# Patient Record
Sex: MALE | Race: WHITE | NOT HISPANIC OR LATINO | Employment: OTHER | URBAN - NONMETROPOLITAN AREA
[De-identification: names, ages, dates, MRNs, and addresses within clinical notes are randomized per-mention and may not be internally consistent; named-entity substitution may affect disease eponyms.]

---

## 2022-04-26 ENCOUNTER — OUTSIDE FACILITY SERVICE (OUTPATIENT)
Dept: FAMILY MEDICINE CLINIC | Facility: CLINIC | Age: 72
End: 2022-04-26

## 2022-04-26 PROCEDURE — 99306 1ST NF CARE HIGH MDM 50: CPT | Performed by: FAMILY MEDICINE

## 2022-05-10 ENCOUNTER — OUTSIDE FACILITY SERVICE (OUTPATIENT)
Dept: FAMILY MEDICINE CLINIC | Facility: CLINIC | Age: 72
End: 2022-05-10

## 2022-05-10 PROCEDURE — 99307 SBSQ NF CARE SF MDM 10: CPT | Performed by: FAMILY MEDICINE

## 2022-05-12 ENCOUNTER — HOSPITAL ENCOUNTER (OUTPATIENT)
Dept: GENERAL RADIOLOGY | Facility: HOSPITAL | Age: 72
Discharge: HOME | End: 2022-05-12
Admitting: FAMILY MEDICINE

## 2022-05-12 DIAGNOSIS — R13.12 DYSPHAGIA, OROPHARYNGEAL: ICD-10-CM

## 2022-05-12 PROCEDURE — 92611 MOTION FLUOROSCOPY/SWALLOW: CPT | Performed by: SPEECH-LANGUAGE PATHOLOGIST

## 2022-05-12 PROCEDURE — 74230 X-RAY XM SWLNG FUNCJ C+: CPT

## 2022-05-12 PROCEDURE — 63710000001 BARIUM SULFATE 96 % RECONSTITUTED SUSPENSION: Performed by: FAMILY MEDICINE

## 2022-05-12 PROCEDURE — A9270 NON-COVERED ITEM OR SERVICE: HCPCS | Performed by: FAMILY MEDICINE

## 2022-05-12 RX ADMIN — BARIUM SULFATE 20 ML: 960 POWDER, FOR SUSPENSION ORAL at 13:34

## 2022-05-12 NOTE — THERAPY EVALUATION
Speech Language Pathology   [facility] [place]  [facility]       Patient Name: Fer Ivan  : 1950  MRN: [id number]    Today's Date: 2022      Visit Date: 2022   SPEECH PATHOLOGY MODIFIED BARIUM SWALLOW STUDY       Views: Patient seated at 90 degrees in:    x__lateral view    __A/P    Ability to follow instructions: __Excellent        __Good          x__Fair  __Poor      Dentition: __Natural  __Dentures   x__Edentulous         __Partials    Presence of Oral Motor Weakness:  Left side weakness    The following consistencies were given, with symptoms as noted:  Consistency Method Labial Seal Oral Prep AP Transit Premature Spillage Delayed Swallow Reflex    Thin 5ml  spoon         Thin  5ml Cup rim performed independent chin tuck Pt took a very small sip.  Maybe 2.5ml Falls to floor of mouth       Pudding 5 ml spoon         Cracker with barium puding 5 ml   tongue pumpting with poor oral control piecemeal Yes, wile completing oral prep part of the bolus begins to go over back of tongue Yes; level of valleculae Multi swallows   Impairments:  x__Premature loss of bolus  __Reduced velopharyngeal closure  x__Decreased lingual propulsion  __Reduced epiglottic inversion  __Decreased hyolaryngeal elevation  __Reduced laryngeal closure  __Reduced esophageal sphincter opening      Penetration-Aspiration Scale Rating:     Category Score Descriptions   No penetration or aspiration 1 Contrast does not enter the airway   Penetration 2 Contrast enters the airway, remains above vocal folds; no residue    3 Contrast remains above vocal folds; visible residue remains    4 Contrast contacts vocal folds; no residue    5 Contrast contacts vocal folds; visible residue remains   Aspiration 6 Contrast passes glottis; no subglottic residue visible    7 Contrast passes glottis; visible subglottic residue despite patient's response    8 Contrast passes glottis; visible subglottic residue; absent patient  "response   Dysphagia Scale Rating:    Dysphagia Rating Scale Explanation   0   Normal swallowing mechanism     1 Minimal Dysphagia- video swallow shows slight deviance from a normal swallow. Patient may report change in sensation during swallow. No change in diet is required.     2 Mild Dysphagia- oropharyngeal dysphagia present, which can be managed by specific swallow suggestions. Slight modification in consistency of diet may be indicated.      3 Mild-Moderate Dysphagia- potential for aspiration exists but is diminished by specific swallow techniques and a modified diet. Time for eating is significantly increased; thus supplemental nutrition may be indicated.      4 Moderate Dysphagia- significant potential for aspiration exists. Trace aspiration of one or more consistencies may be seen under videofluoroscopy. Patient may eat certain consistencies by using specific techniques to minimize potential for aspiration and/or facilitate swallowing. Supervision at mealtimes required. May require supplemental nutrition orally or via feeding tube.      5 Moderately Severe Dysphagia- patient aspirates 5% to 10% on one or more consistencies, with potential for aspiration on all consistencies. Potential for aspiration minimized by specific swallow instructions. Cough reflex absent or non-protective. Alterative mode of feeding required to maintain patient's nutritional needs. If pulmonary status is compromised, \"nothing by mouth\" may be indicated.      6 Severe Dysphagia- more than 10% aspiration for all consistencies. \"Nothing by mouth\" recommended.        Visit Dx:     ICD-10-CM ICD-9-CM   1. Dysphagia, oropharyngeal  R13.12 787.22       There is no problem list on file for this patient.       Past Medical History:   Diagnosis Date   • Stroke (HCC)         No past surgical history on file.               OP SLP Assessment/Plan - 05/12/22 1320        SLP Assessment    Functional Problems Swallowing  -EC    Impact on Function: " Swallowing Risk of malnourishment;Risk of dehydration  -EC    Clinical Impression: Swallowing Moderate:;oral phase dysphagia  -EC    Functional Problems Comment pt has not been able to take PO since CVA 4/1  -EC    Clinical Impression Comments pt has potential to begin PO diet with supervision of SLP at SNF  -EC    Prognosis Good (comment)  -EC    Patient/caregiver participated in establishment of treatment plan and goals Yes  -EC    Patient would benefit from skilled therapy intervention Yes  -EC       SLP Plan    Frequency eval only : MBS  -EC          User Key  (r) = Recorded By, (t) = Taken By, (c) = Cosigned By    Initials Name Provider Type    EC Yadi Guzman CCC-SLP Speech and Language Pathologist                 SLP Adult Swallow Evaluation     Row Name 05/12/22 6770       Rehab Evaluation    Document Type evaluation  -EC    Subjective Information no complaints  -EC    Patient Observations alert;cooperative;agree to therapy  -EC    Patient/Family/Caregiver Comments/Observations seen in radiology  -EC    Patient Effort good  -EC       General Information    Patient Profile Reviewed yes  -EC    Pertinent History Of Current Problem CVA on 4/1 with left side weakness and NPO since that time.  -EC    Current Method of Nutrition gastrostomy feedings  -EC    Prior Level of Function-Swallowing unknown  -EC    Plans/Goals Discussed with patient  -EC    Barriers to Rehab cognitive status  -EC       Oral Motor Structure and Function    Dentition Assessment edentulous  -EC    Secretion Management WNL/WFL  -EC    Mucosal Quality moist, healthy  -EC    Volitional Swallow weak  -EC       MBS/VFSS    Utensils Used spoon;cup  -EC    Consistencies Trialed thin liquids;pureed;soft textures  -EC       MBS/VFSS Interpretation    Oral Prep Phase impaired oral phase of swallowing  -EC    Oral Transit Phase impaired  -EC    Oral Residue impaired  -EC    VFSS Summary there is no aspiration or penetration noted during study.   -EC    Oral Phase, Comment pt has decreased oral control of liquid and solid bolus with left side residue.  liquids to floor of the mouth with cup presentation and tongue pumping for a-p transit.  cracker with delayed a-p transit d/t decreased lingual control.  -EC       Oral Preparatory Phase    Oral Preparatory Phase reduced lip closure around utensil;prolonged manipulation  -EC    Reduced Lip Closure Around Utensil all consistencies tested  -EC    Prolonged Manipulation regular textures  -EC       Oral Transit Phase    Impaired Oral Transit Phase tongue pumping;piecemeal oral transit  -EC       Oral Residue    Impaired Oral Residue lingual residue;other (see comments)  left  -EC    Lingual Residue thin liquids;pudding/puree;secondary to reduced lingual range of motion  -EC       Initiation of Pharyngeal Swallow    Initiation of Pharyngeal Swallow bolus in valleculae  -EC    Pharyngeal Phase functional pharyngeal phase of swallowing  -EC    Pharyngeal Phase, Comment swallow initiation with cracker delayed d/t premature spillage during oral prep.  head of bolus to valleculae before swallow initation  -EC       SLP Evaluation Clinical Impression    SLP Swallowing Diagnosis moderate;oral dysphagia  -EC    Functional Impact risk of malnutrition;risk of aspiration/pneumonia  -EC    Rehab Potential/Prognosis, Swallowing good, to achieve stated therapy goals  -EC    Swallow Criteria for Skilled Therapeutic Interventions Met demonstrates skilled criteria  -EC       Recommendations    Therapy Frequency (Swallow) evaluation only  -EC    SLP Diet Recommendation thin liquids;puree  -EC    Recommended Precautions and Strategies upright posture during/after eating;small bites of food and sips of liquid;no straw;alternate between small bites of food and sips of liquid;check mouth frequently for oral residue/pocketing;hard swallow with each bite or sip;liquid via spoon only;1:1 supervision;assist with feeding  -EC    Oral Care  Recommendations Oral Care BID/PRN  -EC    SLP Rec. for Method of Medication Administration as tolerated  -EC    Monitor for Signs of Aspiration yes  -EC          User Key  (r) = Recorded By, (t) = Taken By, (c) = Cosigned By    Initials Name Provider Type    EC Yadi Guzman CCC-SLP Speech and Language Pathologist                                OP SLP Education     Row Name 05/12/22 1320       Education    Barriers to Learning Decreased comprehension  -EC    Action Taken to Address Barriers results relayed to treating SLP at [facility]  -EC    Education Provided Described results of evaluation;Patient requires further education on strategies, risks  -EC    Assessed Learning needs;Learning motivation;Learning preferences;Learning readiness  -EC    Learning Motivation Strong  -EC    Learning Method Explanation  -EC    Teaching Response Verbalized understanding  -EC          User Key  (r) = Recorded By, (t) = Taken By, (c) = Cosigned By    Initials Name Effective Dates    EC Yadi Guzman CCC-SLP 06/16/21 -                                    Time Calculation:   Untimed Charges  SLP Eval/Re-eval : ST Motion Fluoro Eval Swallow - 92846  [id number] Motion Fluoro Eval Swallow Minutes: 38  Total Minutes  Untimed Charges Total Minutes: 38   Total Minutes: 38    Therapy Charges for Today     Code Description Service Date Service Provider Modifiers Qty    [id number]  ST MOTION FLUORO EVAL SWALLOW 3 5/12/2022 Yadi Guzman CCC-SLP GN 1                  SYDNEY Ordonez  5/12/2022

## 2022-05-24 ENCOUNTER — OUTSIDE FACILITY SERVICE (OUTPATIENT)
Dept: FAMILY MEDICINE CLINIC | Facility: CLINIC | Age: 72
End: 2022-05-24

## 2022-05-24 PROCEDURE — 99308 SBSQ NF CARE LOW MDM 20: CPT | Performed by: FAMILY MEDICINE

## 2022-06-11 ENCOUNTER — HOSPITAL ENCOUNTER (INPATIENT)
Facility: HOSPITAL | Age: 72
LOS: 5 days | Discharge: HOME | End: 2022-06-16
Attending: STUDENT IN AN ORGANIZED HEALTH CARE EDUCATION/TRAINING PROGRAM | Admitting: INTERNAL MEDICINE

## 2022-06-11 ENCOUNTER — APPOINTMENT (OUTPATIENT)
Dept: CT IMAGING | Facility: HOSPITAL | Age: 72
End: 2022-06-11

## 2022-06-11 DIAGNOSIS — N13.30 HYDRONEPHROSIS, UNSPECIFIED HYDRONEPHROSIS TYPE: ICD-10-CM

## 2022-06-11 DIAGNOSIS — K92.0 HEMATEMESIS WITH NAUSEA: ICD-10-CM

## 2022-06-11 DIAGNOSIS — A41.9 SEPSIS, DUE TO UNSPECIFIED ORGANISM, UNSPECIFIED WHETHER ACUTE ORGAN DYSFUNCTION PRESENT: Primary | ICD-10-CM

## 2022-06-11 DIAGNOSIS — D50.8 OTHER IRON DEFICIENCY ANEMIA: ICD-10-CM

## 2022-06-11 DIAGNOSIS — R10.13 EPIGASTRIC PAIN: ICD-10-CM

## 2022-06-11 LAB
ABO GROUP BLD: NORMAL
ALBUMIN SERPL-MCNC: 3.7 G/DL (ref 3.5–5.2)
ALBUMIN/GLOB SERPL: 1.1 G/DL
ALP SERPL-CCNC: 84 U/L (ref 39–117)
ALT SERPL W P-5'-P-CCNC: 83 U/L (ref 1–41)
ANION GAP SERPL CALCULATED.3IONS-SCNC: 18 MMOL/L (ref 5–15)
AST SERPL-CCNC: 29 U/L (ref 1–40)
BASOPHILS # BLD AUTO: 0.04 10*3/MM3 (ref 0–0.2)
BASOPHILS NFR BLD AUTO: 0.2 % (ref 0–1.5)
BILIRUB SERPL-MCNC: 0.8 MG/DL (ref 0–1.2)
BLD GP AB SCN SERPL QL: NEGATIVE
BUN SERPL-MCNC: 56 MG/DL (ref 8–23)
BUN/CREAT SERPL: 32.2 (ref 7–25)
CALCIUM SPEC-SCNC: 9.9 MG/DL (ref 8.6–10.5)
CHLORIDE SERPL-SCNC: 96 MMOL/L (ref 98–107)
CO2 SERPL-SCNC: 20 MMOL/L (ref 22–29)
CREAT SERPL-MCNC: 1.74 MG/DL (ref 0.76–1.27)
D-LACTATE SERPL-SCNC: 1.9 MMOL/L (ref 0.5–2)
D-LACTATE SERPL-SCNC: 2.7 MMOL/L (ref 0.5–2)
DEPRECATED RDW RBC AUTO: 41.9 FL (ref 37–54)
EGFRCR SERPLBLD CKD-EPI 2021: 41.1 ML/MIN/1.73
EOSINOPHIL # BLD AUTO: 0 10*3/MM3 (ref 0–0.4)
EOSINOPHIL NFR BLD AUTO: 0 % (ref 0.3–6.2)
ERYTHROCYTE [DISTWIDTH] IN BLOOD BY AUTOMATED COUNT: 14.2 % (ref 12.3–15.4)
FLUAV RNA RESP QL NAA+PROBE: NOT DETECTED
FLUBV RNA RESP QL NAA+PROBE: NOT DETECTED
GLOBULIN UR ELPH-MCNC: 3.3 GM/DL
GLUCOSE SERPL-MCNC: 187 MG/DL (ref 65–99)
HCT VFR BLD AUTO: 40.2 % (ref 37.5–51)
HGB BLD-MCNC: 13.5 G/DL (ref 13–17.7)
IMM GRANULOCYTES # BLD AUTO: 0.09 10*3/MM3 (ref 0–0.05)
IMM GRANULOCYTES NFR BLD AUTO: 0.5 % (ref 0–0.5)
LYMPHOCYTES # BLD AUTO: 0.54 10*3/MM3 (ref 0.7–3.1)
LYMPHOCYTES NFR BLD AUTO: 3.3 % (ref 19.6–45.3)
MAGNESIUM SERPL-MCNC: 2.2 MG/DL (ref 1.6–2.4)
MCH RBC QN AUTO: 27.4 PG (ref 26.6–33)
MCHC RBC AUTO-ENTMCNC: 33.6 G/DL (ref 31.5–35.7)
MCV RBC AUTO: 81.5 FL (ref 79–97)
MONOCYTES # BLD AUTO: 1.09 10*3/MM3 (ref 0.1–0.9)
MONOCYTES NFR BLD AUTO: 6.6 % (ref 5–12)
NEUTROPHILS NFR BLD AUTO: 14.81 10*3/MM3 (ref 1.7–7)
NEUTROPHILS NFR BLD AUTO: 89.4 % (ref 42.7–76)
NRBC BLD AUTO-RTO: 0 /100 WBC (ref 0–0.2)
PLATELET # BLD AUTO: 441 10*3/MM3 (ref 140–450)
PMV BLD AUTO: 9.6 FL (ref 6–12)
POTASSIUM SERPL-SCNC: 5.3 MMOL/L (ref 3.5–5.2)
PROT SERPL-MCNC: 7 G/DL (ref 6–8.5)
RBC # BLD AUTO: 4.93 10*6/MM3 (ref 4.14–5.8)
RH BLD: POSITIVE
SARS-COV-2 RNA RESP QL NAA+PROBE: NOT DETECTED
SODIUM SERPL-SCNC: 134 MMOL/L (ref 136–145)
T&S EXPIRATION DATE: NORMAL
WBC NRBC COR # BLD: 16.57 10*3/MM3 (ref 3.4–10.8)

## 2022-06-11 PROCEDURE — 83605 ASSAY OF LACTIC ACID: CPT | Performed by: STUDENT IN AN ORGANIZED HEALTH CARE EDUCATION/TRAINING PROGRAM

## 2022-06-11 PROCEDURE — 86901 BLOOD TYPING SEROLOGIC RH(D): CPT | Performed by: STUDENT IN AN ORGANIZED HEALTH CARE EDUCATION/TRAINING PROGRAM

## 2022-06-11 PROCEDURE — 25010000002 ONDANSETRON PER 1 MG: Performed by: STUDENT IN AN ORGANIZED HEALTH CARE EDUCATION/TRAINING PROGRAM

## 2022-06-11 PROCEDURE — 36415 COLL VENOUS BLD VENIPUNCTURE: CPT

## 2022-06-11 PROCEDURE — 86850 RBC ANTIBODY SCREEN: CPT | Performed by: STUDENT IN AN ORGANIZED HEALTH CARE EDUCATION/TRAINING PROGRAM

## 2022-06-11 PROCEDURE — 85025 COMPLETE CBC W/AUTO DIFF WBC: CPT | Performed by: STUDENT IN AN ORGANIZED HEALTH CARE EDUCATION/TRAINING PROGRAM

## 2022-06-11 PROCEDURE — 25010000002 MORPHINE PER 10 MG: Performed by: STUDENT IN AN ORGANIZED HEALTH CARE EDUCATION/TRAINING PROGRAM

## 2022-06-11 PROCEDURE — 74176 CT ABD & PELVIS W/O CONTRAST: CPT

## 2022-06-11 PROCEDURE — 51702 INSERT TEMP BLADDER CATH: CPT

## 2022-06-11 PROCEDURE — 93005 ELECTROCARDIOGRAM TRACING: CPT | Performed by: STUDENT IN AN ORGANIZED HEALTH CARE EDUCATION/TRAINING PROGRAM

## 2022-06-11 PROCEDURE — 81001 URINALYSIS AUTO W/SCOPE: CPT | Performed by: STUDENT IN AN ORGANIZED HEALTH CARE EDUCATION/TRAINING PROGRAM

## 2022-06-11 PROCEDURE — 83735 ASSAY OF MAGNESIUM: CPT | Performed by: STUDENT IN AN ORGANIZED HEALTH CARE EDUCATION/TRAINING PROGRAM

## 2022-06-11 PROCEDURE — 36415 COLL VENOUS BLD VENIPUNCTURE: CPT | Performed by: STUDENT IN AN ORGANIZED HEALTH CARE EDUCATION/TRAINING PROGRAM

## 2022-06-11 PROCEDURE — 93010 ELECTROCARDIOGRAM REPORT: CPT | Performed by: INTERNAL MEDICINE

## 2022-06-11 PROCEDURE — 87636 SARSCOV2 & INF A&B AMP PRB: CPT | Performed by: STUDENT IN AN ORGANIZED HEALTH CARE EDUCATION/TRAINING PROGRAM

## 2022-06-11 PROCEDURE — 80053 COMPREHEN METABOLIC PANEL: CPT | Performed by: STUDENT IN AN ORGANIZED HEALTH CARE EDUCATION/TRAINING PROGRAM

## 2022-06-11 PROCEDURE — 99285 EMERGENCY DEPT VISIT HI MDM: CPT

## 2022-06-11 PROCEDURE — 86900 BLOOD TYPING SEROLOGIC ABO: CPT | Performed by: STUDENT IN AN ORGANIZED HEALTH CARE EDUCATION/TRAINING PROGRAM

## 2022-06-11 PROCEDURE — 87086 URINE CULTURE/COLONY COUNT: CPT | Performed by: HOSPITALIST

## 2022-06-11 RX ORDER — ONDANSETRON 2 MG/ML
4 INJECTION INTRAMUSCULAR; INTRAVENOUS ONCE
Status: COMPLETED | OUTPATIENT
Start: 2022-06-11 | End: 2022-06-11

## 2022-06-11 RX ORDER — PANTOPRAZOLE SODIUM 40 MG/10ML
80 INJECTION, POWDER, LYOPHILIZED, FOR SOLUTION INTRAVENOUS ONCE
Status: COMPLETED | OUTPATIENT
Start: 2022-06-11 | End: 2022-06-11

## 2022-06-11 RX ADMIN — PANTOPRAZOLE SODIUM 80 MG: 40 INJECTION, POWDER, FOR SOLUTION INTRAVENOUS at 19:28

## 2022-06-11 RX ADMIN — MORPHINE SULFATE 4 MG: 4 INJECTION INTRAVENOUS at 19:26

## 2022-06-11 RX ADMIN — ONDANSETRON 4 MG: 2 INJECTION INTRAMUSCULAR; INTRAVENOUS at 19:25

## 2022-06-11 RX ADMIN — SODIUM CHLORIDE, POTASSIUM CHLORIDE, SODIUM LACTATE AND CALCIUM CHLORIDE 1000 ML: 600; 310; 30; 20 INJECTION, SOLUTION INTRAVENOUS at 19:21

## 2022-06-11 NOTE — ED PROVIDER NOTES
Subjective   72-year-old male comes to the ER with chief complaint of nausea, vomiting, abdominal pain that has been constant since yesterday and slowly getting worse.  Patient endorses having black stool today.  The nursing facility sent him to the ER for evaluation.      History provided by:  Patient and nursing home  History limited by: Poor historian.   used: No        Review of Systems   Constitutional: Positive for activity change and appetite change. Negative for chills and fever.   HENT: Negative for drooling.    Eyes: Negative for redness.   Respiratory: Negative for cough, chest tightness, shortness of breath and wheezing.    Cardiovascular: Negative for chest pain and palpitations.   Gastrointestinal: Positive for abdominal pain, blood in stool, nausea and vomiting. Negative for constipation and diarrhea.   Genitourinary: Negative for flank pain.   Skin: Negative for color change.   Neurological: Negative for seizures.   Psychiatric/Behavioral: Negative for confusion.       Past Medical History:   Diagnosis Date   • Stroke (HCC)        No Known Allergies    No past surgical history on file.    No family history on file.    Social History     Socioeconomic History   • Marital status:            Objective    Vitals:    06/11/22 2226 06/11/22 2246 06/11/22 2306 06/11/22 2327   BP: 119/76 118/75 125/75 100/61   BP Location:       Patient Position:       Pulse: (!) 138 (!) 132 (!) 130 (!) 132   Resp:       Temp:       TempSrc:       SpO2:  94% 93% 93%   Weight:       Height:           Physical Exam  Vitals and nursing note reviewed.   Constitutional:       General: He is not in acute distress.     Appearance: He is well-developed. He is not ill-appearing, toxic-appearing or diaphoretic.   HENT:      Head: Normocephalic.      Right Ear: External ear normal.      Left Ear: External ear normal.   Eyes:      General: No scleral icterus.     Conjunctiva/sclera: Conjunctivae normal.    Cardiovascular:      Rate and Rhythm: Tachycardia present.   Pulmonary:      Effort: Pulmonary effort is normal. No accessory muscle usage or respiratory distress.      Breath sounds: No wheezing.   Chest:      Chest wall: No tenderness.   Abdominal:      General: Bowel sounds are normal.      Palpations: Abdomen is soft.      Tenderness: There is abdominal tenderness (deep palpation). There is no guarding or rebound.   Musculoskeletal:      Right lower leg: No edema.      Left lower leg: No edema.   Skin:     General: Skin is warm and dry.      Capillary Refill: Capillary refill takes less than 2 seconds.      Coloration: Skin is not pale.   Neurological:      Mental Status: He is alert and oriented to person, place, and time.   Psychiatric:         Behavior: Behavior normal. Behavior is cooperative.         ECG 12 Lead      Date/Time: 6/11/2022 7:24 PM  Performed by: Jesus Doherty MD  Authorized by: Jesus Doherty MD   Interpreted by physician  Rhythm: sinus tachycardia  Rate: tachycardic  QRS axis: normal  Conduction: left bundle branch block  ST segment elevation noted on lead: [id number].  Clinical impression: abnormal ECG                 ED Course      Results for orders placed or performed during the hospital encounter of 06/11/22   COVID-19 and FLU A/B PCR - Swab, Nasopharynx    Specimen: Nasopharynx; Swab   Result Value Ref Range    COVID19 Not Detected Not Detected - Ref. Range    Influenza A PCR Not Detected Not Detected    Influenza B PCR Not Detected Not Detected   Comprehensive Metabolic Panel    Specimen: Blood   Result Value Ref Range    Glucose 187 (H) 65 - 99 mg/dL    BUN 56 (H) 8 - 23 mg/dL    Creatinine 1.74 (H) 0.76 - 1.27 mg/dL    Sodium 134 (L) 136 - 145 mmol/L    Potassium 5.3 (H) 3.5 - 5.2 mmol/L    Chloride 96 (L) 98 - 107 mmol/L    CO2 20.0 (L) 22.0 - 29.0 mmol/L    Calcium 9.9 8.6 - 10.5 mg/dL    Total Protein 7.0 6.0 - 8.5 g/dL    Albumin 3.70 3.50 - 5.20 g/dL    ALT (SGPT) 83 (H) 1  - 41 U/L    AST (SGOT) 29 1 - 40 U/L    Alkaline Phosphatase 84 39 - 117 U/L    Total Bilirubin 0.8 0.0 - 1.2 mg/dL    Globulin 3.3 gm/dL    A/G Ratio 1.1 g/dL    BUN/Creatinine Ratio 32.2 (H) 7.0 - 25.0    Anion Gap 18.0 (H) 5.0 - 15.0 mmol/L    eGFR 41.1 (L) >60.0 mL/min/1.73   Lactic Acid, Plasma    Specimen: Blood   Result Value Ref Range    Lactate 2.7 (C) 0.5 - 2.0 mmol/L   Magnesium    Specimen: Blood   Result Value Ref Range    Magnesium 2.2 1.6 - 2.4 mg/dL   CBC Auto Differential    Specimen: Blood   Result Value Ref Range    WBC 16.57 (H) 3.40 - 10.80 10*3/mm3    RBC 4.93 4.14 - 5.80 10*6/mm3    Hemoglobin 13.5 13.0 - 17.7 g/dL    Hematocrit 40.2 37.5 - 51.0 %    MCV 81.5 79.0 - 97.0 fL    MCH 27.4 26.6 - 33.0 pg    MCHC 33.6 31.5 - 35.7 g/dL    RDW 14.2 12.3 - 15.4 %    RDW-SD 41.9 37.0 - 54.0 fl    MPV 9.6 6.0 - 12.0 fL    Platelets 441 140 - 450 10*3/mm3    Neutrophil % 89.4 (H) 42.7 - 76.0 %    Lymphocyte % 3.3 (L) 19.6 - 45.3 %    Monocyte % 6.6 5.0 - 12.0 %    Eosinophil % 0.0 (L) 0.3 - 6.2 %    Basophil % 0.2 0.0 - 1.5 %    Immature Grans % 0.5 0.0 - 0.5 %    Neutrophils, Absolute 14.81 (H) 1.70 - 7.00 10*3/mm3    Lymphocytes, Absolute 0.54 (L) 0.70 - 3.10 10*3/mm3    Monocytes, Absolute 1.09 (H) 0.10 - 0.90 10*3/mm3    Eosinophils, Absolute 0.00 0.00 - 0.40 10*3/mm3    Basophils, Absolute 0.04 0.00 - 0.20 10*3/mm3    Immature Grans, Absolute 0.09 (H) 0.00 - 0.05 10*3/mm3    nRBC 0.0 0.0 - 0.2 /100 WBC   STAT Lactic Acid, Reflex    Specimen: Blood   Result Value Ref Range    Lactate 1.9 0.5 - 2.0 mmol/L   Type & Screen    Specimen: Blood   Result Value Ref Range    ABO Type A     RH type Positive     Antibody Screen Negative     T&S Expiration Date 6/14/2022 11:59:59 PM         CT Abdomen Pelvis Without Contrast   Final Result   1.  Moderately severe bilateral hydronephrosis and hydroureter.   The bladder is distended.   2.  No bowel obstruction, herniated bowel loops or focal   inflammatory  changes.   3.  Colonic diverticulosis      Electronically signed by:  Rakesh May MD  6/11/2022 10:09 PM CDT   Workstation: [id number]              MDM  Number of Diagnoses or Management Options  Hydronephrosis, unspecified hydronephrosis type: new and requires workup  Sepsis, due to unspecified organism, unspecified whether acute organ dysfunction present (HCC): new and requires workup  Diagnosis management comments: Vital signs are stable, afebrile.  Patient is tachycardic to the 140s.  Labs significant for creatinine 1.7 and unsure about baseline, lactate is 2.7 with a white count of 17.  Hemoglobin is normal at 13.5.  Patient received Protonix, IVF bolus, morphine, Zofran.  CT abdomen pelvis shows severe hydronephrosis, hydroureter, distended bladder.  No urine with flow out of the patient's catheter.  This was replaced and a thick sludgelike material drained slightly.  Continuous bladder irrigation was ordered.  Antibiotics ordered.  On-call hospitalist agrees to admit.      Final diagnoses:   Sepsis, due to unspecified organism, unspecified whether acute organ dysfunction present (HCC)   Hydronephrosis, unspecified hydronephrosis type       ED Disposition  ED Disposition     ED Disposition   Decision to Admit    Condition   --    Comment   --             No follow-up provider specified.       Medication List      No changes were made to your prescriptions during this visit.          Jesus Doherty MD  06/11/22 2993       Jesus Doherty MD  06/11/22 2180

## 2022-06-12 LAB
ANION GAP SERPL CALCULATED.3IONS-SCNC: 15 MMOL/L (ref 5–15)
BACTERIA UR QL AUTO: ABNORMAL /HPF
BASOPHILS # BLD AUTO: 0.09 10*3/MM3 (ref 0–0.2)
BASOPHILS NFR BLD AUTO: 0.6 % (ref 0–1.5)
BILIRUB UR QL STRIP: ABNORMAL
BUN SERPL-MCNC: 59 MG/DL (ref 8–23)
BUN/CREAT SERPL: 37.8 (ref 7–25)
CALCIUM SPEC-SCNC: 9.6 MG/DL (ref 8.6–10.5)
CHLORIDE SERPL-SCNC: 98 MMOL/L (ref 98–107)
CLARITY UR: ABNORMAL
CO2 SERPL-SCNC: 22 MMOL/L (ref 22–29)
COLOR UR: ABNORMAL
CRE SCREEN PCR: NOT DETECTED
CREAT SERPL-MCNC: 1.56 MG/DL (ref 0.76–1.27)
D-LACTATE SERPL-SCNC: 1.2 MMOL/L (ref 0.5–2)
DEPRECATED RDW RBC AUTO: 41.6 FL (ref 37–54)
EGFRCR SERPLBLD CKD-EPI 2021: 46.9 ML/MIN/1.73
EOSINOPHIL # BLD AUTO: 0.18 10*3/MM3 (ref 0–0.4)
EOSINOPHIL NFR BLD AUTO: 1.1 % (ref 0.3–6.2)
ERYTHROCYTE [DISTWIDTH] IN BLOOD BY AUTOMATED COUNT: 14.2 % (ref 12.3–15.4)
GLUCOSE SERPL-MCNC: 147 MG/DL (ref 65–99)
GLUCOSE UR STRIP-MCNC: NEGATIVE MG/DL
HCT VFR BLD AUTO: 30.8 % (ref 37.5–51)
HCT VFR BLD AUTO: 37.8 % (ref 37.5–51)
HGB BLD-MCNC: 10.3 G/DL (ref 13–17.7)
HGB BLD-MCNC: 12.6 G/DL (ref 13–17.7)
HGB UR QL STRIP.AUTO: ABNORMAL
HYALINE CASTS UR QL AUTO: ABNORMAL /LPF
IMM GRANULOCYTES # BLD AUTO: 0.09 10*3/MM3 (ref 0–0.05)
IMM GRANULOCYTES NFR BLD AUTO: 0.6 % (ref 0–0.5)
IMP STRAIN: NOT DETECTED
KETONES UR QL STRIP: NEGATIVE
KPC STRAIN: NOT DETECTED
LEUKOCYTE ESTERASE UR QL STRIP.AUTO: ABNORMAL
LYMPHOCYTES # BLD AUTO: 0.96 10*3/MM3 (ref 0.7–3.1)
LYMPHOCYTES NFR BLD AUTO: 6.1 % (ref 19.6–45.3)
Lab: NORMAL
MAGNESIUM SERPL-MCNC: 2.3 MG/DL (ref 1.6–2.4)
MCH RBC QN AUTO: 27.5 PG (ref 26.6–33)
MCHC RBC AUTO-ENTMCNC: 33.3 G/DL (ref 31.5–35.7)
MCV RBC AUTO: 82.4 FL (ref 79–97)
MONOCYTES # BLD AUTO: 0.8 10*3/MM3 (ref 0.1–0.9)
MONOCYTES NFR BLD AUTO: 5.1 % (ref 5–12)
NDM STRAIN: NOT DETECTED
NEUTROPHILS NFR BLD AUTO: 13.54 10*3/MM3 (ref 1.7–7)
NEUTROPHILS NFR BLD AUTO: 86.5 % (ref 42.7–76)
NITRITE UR QL STRIP: NEGATIVE
NRBC BLD AUTO-RTO: 0 /100 WBC (ref 0–0.2)
OXA 48 STRAIN: NOT DETECTED
PH UR STRIP.AUTO: 8 [PH] (ref 5–9)
PLATELET # BLD AUTO: 323 10*3/MM3 (ref 140–450)
PMV BLD AUTO: 9.6 FL (ref 6–12)
POTASSIUM SERPL-SCNC: 5.1 MMOL/L (ref 3.5–5.2)
PROT UR QL STRIP: ABNORMAL
QT INTERVAL: 314 MS
QTC INTERVAL: 475 MS
RBC # BLD AUTO: 4.59 10*6/MM3 (ref 4.14–5.8)
RBC # UR STRIP: ABNORMAL /HPF
REF LAB TEST METHOD: ABNORMAL
SODIUM SERPL-SCNC: 135 MMOL/L (ref 136–145)
SP GR UR STRIP: 1.02 (ref 1–1.03)
SQUAMOUS #/AREA URNS HPF: ABNORMAL /HPF
TRI-PHOS CRY URNS QL MICRO: ABNORMAL /HPF
UROBILINOGEN UR QL STRIP: ABNORMAL
VIM STRAIN: NOT DETECTED
WBC # UR STRIP: ABNORMAL /HPF
WBC NRBC COR # BLD: 15.66 10*3/MM3 (ref 3.4–10.8)

## 2022-06-12 PROCEDURE — 87081 CULTURE SCREEN ONLY: CPT | Performed by: INTERNAL MEDICINE

## 2022-06-12 PROCEDURE — 85018 HEMOGLOBIN: CPT | Performed by: INTERNAL MEDICINE

## 2022-06-12 PROCEDURE — 83605 ASSAY OF LACTIC ACID: CPT | Performed by: INTERNAL MEDICINE

## 2022-06-12 PROCEDURE — 85014 HEMATOCRIT: CPT | Performed by: INTERNAL MEDICINE

## 2022-06-12 PROCEDURE — 83735 ASSAY OF MAGNESIUM: CPT | Performed by: INTERNAL MEDICINE

## 2022-06-12 PROCEDURE — 87040 BLOOD CULTURE FOR BACTERIA: CPT | Performed by: INTERNAL MEDICINE

## 2022-06-12 PROCEDURE — 25010000002 VANCOMYCIN 10 G RECONSTITUTED SOLUTION: Performed by: INTERNAL MEDICINE

## 2022-06-12 PROCEDURE — 25010000002 CEFTRIAXONE PER 250 MG: Performed by: INTERNAL MEDICINE

## 2022-06-12 PROCEDURE — 85025 COMPLETE CBC W/AUTO DIFF WBC: CPT | Performed by: INTERNAL MEDICINE

## 2022-06-12 PROCEDURE — 80048 BASIC METABOLIC PNL TOTAL CA: CPT | Performed by: INTERNAL MEDICINE

## 2022-06-12 RX ORDER — POLYETHYLENE GLYCOL 3350 17 G/17G
17 POWDER, FOR SOLUTION ORAL DAILY
COMMUNITY
End: 2022-06-28 | Stop reason: SDUPTHER

## 2022-06-12 RX ORDER — ROSUVASTATIN CALCIUM 20 MG/1
40 TABLET, COATED ORAL NIGHTLY
COMMUNITY
End: 2022-06-28 | Stop reason: SDUPTHER

## 2022-06-12 RX ORDER — SODIUM CHLORIDE 0.9 % (FLUSH) 0.9 %
10 SYRINGE (ML) INJECTION AS NEEDED
Status: DISCONTINUED | OUTPATIENT
Start: 2022-06-12 | End: 2022-06-16 | Stop reason: HOSPADM

## 2022-06-12 RX ORDER — SODIUM CHLORIDE, SODIUM LACTATE, POTASSIUM CHLORIDE, CALCIUM CHLORIDE 600; 310; 30; 20 MG/100ML; MG/100ML; MG/100ML; MG/100ML
100 INJECTION, SOLUTION INTRAVENOUS CONTINUOUS
Status: DISPENSED | OUTPATIENT
Start: 2022-06-12 | End: 2022-06-12

## 2022-06-12 RX ORDER — ACETAMINOPHEN 160 MG/5ML
650 SOLUTION ORAL EVERY 4 HOURS PRN
Status: DISCONTINUED | OUTPATIENT
Start: 2022-06-12 | End: 2022-06-16 | Stop reason: HOSPADM

## 2022-06-12 RX ORDER — ACETAMINOPHEN 325 MG/1
650 TABLET ORAL EVERY 4 HOURS PRN
Status: DISCONTINUED | OUTPATIENT
Start: 2022-06-12 | End: 2022-06-16 | Stop reason: HOSPADM

## 2022-06-12 RX ORDER — SODIUM CHLORIDE 0.9 % (FLUSH) 0.9 %
10 SYRINGE (ML) INJECTION EVERY 12 HOURS SCHEDULED
Status: DISCONTINUED | OUTPATIENT
Start: 2022-06-12 | End: 2022-06-16 | Stop reason: HOSPADM

## 2022-06-12 RX ORDER — TRAMADOL HYDROCHLORIDE 50 MG/1
50 TABLET ORAL EVERY 6 HOURS PRN
Status: DISCONTINUED | OUTPATIENT
Start: 2022-06-12 | End: 2022-06-16 | Stop reason: HOSPADM

## 2022-06-12 RX ORDER — NALOXONE HCL 0.4 MG/ML
0.4 VIAL (ML) INJECTION
Status: DISCONTINUED | OUTPATIENT
Start: 2022-06-12 | End: 2022-06-16 | Stop reason: HOSPADM

## 2022-06-12 RX ORDER — PANTOPRAZOLE SODIUM 40 MG/10ML
40 INJECTION, POWDER, LYOPHILIZED, FOR SOLUTION INTRAVENOUS EVERY 12 HOURS SCHEDULED
Status: DISCONTINUED | OUTPATIENT
Start: 2022-06-12 | End: 2022-06-16 | Stop reason: HOSPADM

## 2022-06-12 RX ORDER — LANOLIN ALCOHOL/MO/W.PET/CERES
5 CREAM (GRAM) TOPICAL NIGHTLY PRN
Status: DISCONTINUED | OUTPATIENT
Start: 2022-06-12 | End: 2022-06-16 | Stop reason: HOSPADM

## 2022-06-12 RX ORDER — LACTULOSE 10 G/15ML
20 SOLUTION ORAL AS NEEDED
COMMUNITY
End: 2022-06-28 | Stop reason: SDUPTHER

## 2022-06-12 RX ORDER — ONDANSETRON 2 MG/ML
4 INJECTION INTRAMUSCULAR; INTRAVENOUS EVERY 6 HOURS PRN
Status: DISCONTINUED | OUTPATIENT
Start: 2022-06-12 | End: 2022-06-16 | Stop reason: HOSPADM

## 2022-06-12 RX ORDER — PHENOL 1.4 %
10 AEROSOL, SPRAY (ML) MUCOUS MEMBRANE NIGHTLY
COMMUNITY
End: 2022-06-28 | Stop reason: SDUPTHER

## 2022-06-12 RX ORDER — ENOXAPARIN SODIUM 100 MG/ML
40 INJECTION SUBCUTANEOUS EVERY 24 HOURS
Status: DISCONTINUED | OUTPATIENT
Start: 2022-06-12 | End: 2022-06-12

## 2022-06-12 RX ORDER — ACETAMINOPHEN 650 MG/1
650 SUPPOSITORY RECTAL EVERY 4 HOURS PRN
Status: DISCONTINUED | OUTPATIENT
Start: 2022-06-12 | End: 2022-06-16 | Stop reason: HOSPADM

## 2022-06-12 RX ORDER — NICOTINE 21 MG/24HR
1 PATCH, TRANSDERMAL 24 HOURS TRANSDERMAL
Status: DISCONTINUED | OUTPATIENT
Start: 2022-06-12 | End: 2022-06-16 | Stop reason: HOSPADM

## 2022-06-12 RX ORDER — OMEPRAZOLE 20 MG/1
20 CAPSULE, DELAYED RELEASE ORAL DAILY
COMMUNITY
End: 2022-06-28 | Stop reason: SDUPTHER

## 2022-06-12 RX ORDER — NICOTINE 21 MG/24HR
1 PATCH, TRANSDERMAL 24 HOURS TRANSDERMAL EVERY 24 HOURS
COMMUNITY
Start: 2022-05-24 | End: 2022-06-21

## 2022-06-12 RX ORDER — DOCUSATE SODIUM 100 MG/1
100 CAPSULE, LIQUID FILLED ORAL 2 TIMES DAILY
COMMUNITY
End: 2022-06-28 | Stop reason: SDUPTHER

## 2022-06-12 RX ORDER — CALCIUM CARBONATE 200(500)MG
1 TABLET,CHEWABLE ORAL 2 TIMES DAILY PRN
Status: DISCONTINUED | OUTPATIENT
Start: 2022-06-12 | End: 2022-06-16 | Stop reason: HOSPADM

## 2022-06-12 RX ORDER — MORPHINE SULFATE 2 MG/ML
2 INJECTION, SOLUTION INTRAMUSCULAR; INTRAVENOUS EVERY 4 HOURS PRN
Status: DISCONTINUED | OUTPATIENT
Start: 2022-06-12 | End: 2022-06-16 | Stop reason: HOSPADM

## 2022-06-12 RX ORDER — FLUOXETINE HYDROCHLORIDE 20 MG/1
20 CAPSULE ORAL DAILY
COMMUNITY
End: 2022-06-28 | Stop reason: SDUPTHER

## 2022-06-12 RX ORDER — METOCLOPRAMIDE 5 MG/1
5 TABLET ORAL 4 TIMES DAILY
COMMUNITY
End: 2022-06-28 | Stop reason: SDUPTHER

## 2022-06-12 RX ADMIN — Medication 10 ML: at 20:55

## 2022-06-12 RX ADMIN — VANCOMYCIN HYDROCHLORIDE 1000 MG: 10 INJECTION, POWDER, LYOPHILIZED, FOR SOLUTION INTRAVENOUS at 05:02

## 2022-06-12 RX ADMIN — PANTOPRAZOLE SODIUM 40 MG: 40 INJECTION, POWDER, FOR SOLUTION INTRAVENOUS at 08:41

## 2022-06-12 RX ADMIN — SODIUM CHLORIDE, POTASSIUM CHLORIDE, SODIUM LACTATE AND CALCIUM CHLORIDE 100 ML/HR: 600; 310; 30; 20 INJECTION, SOLUTION INTRAVENOUS at 08:42

## 2022-06-12 RX ADMIN — NICOTINE 1 PATCH: 21 PATCH, EXTENDED RELEASE TRANSDERMAL at 08:41

## 2022-06-12 RX ADMIN — CEFTRIAXONE SODIUM 2 G: 2 INJECTION, POWDER, FOR SOLUTION INTRAMUSCULAR; INTRAVENOUS at 04:14

## 2022-06-12 RX ADMIN — PANTOPRAZOLE SODIUM 40 MG: 40 INJECTION, POWDER, FOR SOLUTION INTRAVENOUS at 20:55

## 2022-06-12 NOTE — PROGRESS NOTES
[facility] Medicine Services  INPATIENT PROGRESS NOTE    Length of Stay: 1  Date of Admission: 6/11/2022  Primary Care Physician: Estrada Fuentes MD    Subjective   Chief Complaint: Bloody stool, abdominal pain  HPI: Patient states he is okay.    Review of Systems   Unable to perform ROS: Other   Poor historian after stroke.      Objective    Temp:  [97.3 °F (36.3 °C)-98.4 °F (36.9 °C)] 98.4 °F (36.9 °C)  Heart Rate:  [115-145] 118  Resp:  [20] 20  BP: ()/(55-88) 91/55    Physical Exam  Vitals reviewed.   Constitutional:       Appearance: He is well-developed. He is ill-appearing.   HENT:      Head: Normocephalic and atraumatic.   Eyes:      Pupils: Pupils are equal, round, and reactive to light.   Cardiovascular:      Rate and Rhythm: Regular rhythm. Tachycardia present.      Heart sounds: Normal heart sounds. No murmur heard.    No friction rub. No gallop.   Pulmonary:      Effort: Pulmonary effort is normal. No respiratory distress.      Breath sounds: Normal breath sounds. No wheezing or rales.   Chest:      Chest wall: No tenderness.   Abdominal:      General: Bowel sounds are normal. There is no distension.      Palpations: Abdomen is soft.      Tenderness: There is no abdominal tenderness.   Musculoskeletal:      Cervical back: Normal range of motion and neck supple.   Psychiatric:         Behavior: Behavior normal.       Results Review:  I have reviewed the labs, radiology results, and diagnostic studies.    Laboratory Data:   Results from last 7 days   Lab Units 06/12/22  0333 06/11/22  1914   SODIUM mmol/L 135* 134*   POTASSIUM mmol/L 5.1 5.3*   CHLORIDE mmol/L 98 96*   CO2 mmol/L 22.0 20.0*   BUN mg/dL 59* 56*   CREATININE mg/dL 1.56* 1.74*   GLUCOSE mg/dL 147* 187*   CALCIUM mg/dL 9.6 9.9   BILIRUBIN mg/dL  --  0.8   ALK PHOS U/L  --  84   ALT (SGPT) U/L  --  83*   AST (SGOT) U/L  --  29   ANION GAP mmol/L 15.0 18.0*     Estimated Creatinine  Clearance: 42.9 mL/min (A) (by C-G formula based on SCr of 1.56 mg/dL (H)).  Results from last 7 days   Lab Units 06/12/22  0333 06/11/22  1914   MAGNESIUM mg/dL 2.3 2.2         Results from last 7 days   Lab Units 06/12/22  0333 06/11/22 1914   WBC 10*3/mm3 15.66* 16.57*   HEMOGLOBIN g/dL 12.6* 13.5   HEMATOCRIT % 37.8 40.2   PLATELETS 10*3/mm3 323 441           Culture Data:   No results found for: BLOODCX  No results found for: URINECX  No results found for: RESPCX  No results found for: WOUNDCX  No results found for: STOOLCX  No components found for: BODYFLD    Radiology Data:   Imaging Results (Last 24 Hours)     Procedure Component Value Units Date/Time    CT Abdomen Pelvis Without Contrast [[id number]] Collected: 06/11/22 2102     Updated: 06/11/22 2211    Narrative:      EXAM DESCRIPTION:  CT ABDOMEN PELVIS WO CONTRAST  RadLex: CT ABDOMEN PELVIS WITHOUT IV CONTRAST     CLINICAL HISTORY:   72 years  Male;  abd pain, n/v    TECHNOLOGIST NOTES:    TECHNIQUE: Helical CT imaging was obtained of the abdomen and  pelvis with multiplanar reconstructions. This exam was performed  according to our departmental dose-optimization program, which  includes automated exposure control, adjustment of the mA and/or  kV according to patient size and/or use of iterative  reconstruction techniques.     COMPARISON: None currently available    FINDINGS:   Abdomen:  No evidence of acute disease in the visualized lung bases.    There is a PEG tube.  The liver, spleen, pancreas, adrenal glands  and kidneys show no acute abnormality. No evidence of acute  pancreatitis.    There are no calcified gallstones. There is no  gallbladder wall thickening. No pericholecystic fluid.  There is  no gross biliary duct dilatation.  There are no renal stones.  There is moderately severe bilateral hydronephrosis and  hydroureter.      No free air.    There is no significant free fluid.    There is  no significant aneurysmal enlargement of the  abdominal aorta.    Pelvis:  There is no evidence of bowel obstruction.    No herniated bowel  loops.  . No focal inflammatory changes . No evidence of  appendicitis. There is colonic diverticulosis but no focal  diverticulitis.  Evaluation of the bowel is limited when there is  no oral contrast or when the bowel is incompletely opacified with  enteric contrast.. There is no significant free fluid in the  pelvis. The bladder is distended. There is a Hester catheter in  place..          Impression:      1.  Moderately severe bilateral hydronephrosis and hydroureter.  The bladder is distended.  2.  No bowel obstruction, herniated bowel loops or focal  inflammatory changes.  3.  Colonic diverticulosis    Electronically signed by:  Rakesh May MD  6/11/2022 10:09 PM CDT  Workstation: [id number]          I have reviewed the patient's current medications.     Assessment/Plan     Active Hospital Problems    Diagnosis    • Sepsis (HCC)    • Sepsis, due to unspecified organism, unspecified whether acute organ dysfunction present (HCC)        Plan:  1.  Sepsis: Secondary to urinary tract infection.  Supportive care.  Aggressive fluid resuscitation.  2.  Urinary tract infection: Likely secondary to chronic indwelling Hester.  Hester was changed in the emergency department.  Patient with bilateral hydronephrosis and hydroureter prior to Hester being changed.  Blood and urine cultures are pending.  Continue broad-spectrum antibiotics.  3.  GI bleed: Continue IV Protonix.  Hemoglobin remaining reasonably stable.  Will need GI consultation.  4.  Nutrition: Patient with significant dysphagia requiring tube feedings.  Continue tube feedings.  5.  DVT prophylaxis: SCDs.    The patient was evaluated during the global COVID-19 pandemic, and the diagnosis was suspected/considered upon their initial presentation.  Evaluation, treatment, and testing were consistent with current guidelines for patients who present with complaints or symptoms that  may be related to COVID-19.    I confirmed that the patient's Advance Care Plan is present, code status is documented, or surrogate decision maker is listed in the patient's medical record.       Discharge Planning: I expect patient to be discharged to home versus skilled facility in 3-4 days.        This document has been electronically signed by Rg Baer MD on June 12, 2022 18:38 CDT

## 2022-06-12 NOTE — CONSULTS
"Adult Nutrition  Assessment    Patient Name:  Fer Ivan  YOB: 1950  MRN: [id number]  Admit Date:  6/11/2022    Assessment Date:  6/12/2022    Comments:  Pt with dx sepsis, lactic acidosis, GI bleed, hx stroke.  Pt noted to have bloody stool, coffee ground emesis, abdominal pain.  Consult received regarding Tube Feeding assessment.  RN contacted RDN regarding tube feeding.  Pt has orders for Clear Liquids and tube feeding of Isosource 1.5 starting at 30cc/hr advancing by 15cc/hr every 4 hr to goal rate of 60 cc/hr which will provide ` 2160 miya and 97 grams pro/day which will meet calorie and protein needs.  Protonix and PRN Zofran prescribed.  Na is low but is being replaced.  Hgb is low but Hct is WNL.  Will monitor tolerance to tube feeding and for diet advancement.     Reason for Assessment     Row Name 06/12/22 1438          Reason for Assessment    Reason For Assessment physician consult  tube feeding asssessment     Identified At Risk by Screening Criteria tube feeding or parenteral nutrition                  Anthropometrics     Row Name 06/12/22 1442          Anthropometrics    Age for Calculations 72     Height for Calculation 1.829 m (6' 0.01\")     Weight for Calculation 80.9 kg (178 lb 5.6 oz)                Labs/Tests/Procedures/Meds     Row Name 06/12/22 1441          Labs/Procedures/Meds    Lab Results Reviewed reviewed, pertinent            Medications    Pertinent Medications Reviewed reviewed, pertinent                  Estimated/Assessed Needs - Anthropometrics     Row Name 06/12/22 1442          Anthropometrics    Age for Calculations 72     Height for Calculation 1.829 m (6' 0.01\")     Weight for Calculation 80.9 kg (178 lb 5.6 oz)            Estimated/Assessed Needs    Additional Documentation Fluid Requirements (Group);[place]-St. Jeor Equation (Group);Protein Requirements (Group)            [place]-St. Jeor Equation    RMR ([place]-St. Jeor Equation) 2207.127     [place]-St. " [place] Activity Factors 1.2     Activity Factors ([place]-StTerri Penn) 1916.5524            Protein Requirements    Weight Used For Protein Calculations 80.9 kg (178 lb 5.6 oz)     Est Protein Requirement Amount (gms/kg) 1.2 gm protein     Estimated Protein Requirements (gms/day) 97.08            Fluid Requirements    Fluid Requirements (mL/day) 2022     RDA Method (mL) 2022                Nutrition Prescription Ordered     Row Name 06/12/22 1444          Nutrition Prescription PO    Current PO Diet Clear Liquid            Nutrition Prescription EN    Product Isosource 1.5 (Jevity 1.5)     TF Delivery Method Continuous     Continuous TF Goal Rate (mL/hr) 60 mL/hr     Continuous TF Current Rate (mL/hr) 30 mL/hr     Continuous TF Goal Volume (mL) 1400 mL     Continuous TF Current Volume (mL) 720 mL                       Electronically signed by:  Adrianna Traylor RD  06/12/22 14:57 CDT

## 2022-06-12 NOTE — PLAN OF CARE
Goal Outcome Evaluation:  Plan of Care Reviewed With: patient        Progress: no change  Outcome Evaluation: Tube feeding started, tolerating, denies pain, no acute issues

## 2022-06-12 NOTE — H&P
"    [facility] Medicine  HISTORY AND PHYSICAL      Date of Admission: 6/11/2022  Primary Care Physician: Estrada Fuentes MD    Subjective     Chief Complaint: Bloody stool, coffee-ground emesis, abdominal pain    History of Present Illness  Patient with history of CVA April 2, 2022 with residual left-sided weakness presents from nursing home with nausea, vomiting, abdominal pain, bloody stools, and coffee-ground emesis.  Patient accompanied by daughter who is patient's healthcare power of  in emergency room.  Daughter states patient was sent to hospital from [facility] short-term re[facility].  Daughter states patient started experiencing abdominal pain on Friday, which progressed to normal bowel movements Friday afternoon and Friday night.  Patient states he started vomiting Friday \"at least 3 times.\",  But does not mention coffee-ground emesis.  Daughter then states that SNF informed her patient suffered from dark stools Saturday around 5:30 PM.  SNF also mention patient suffering from coffee-ground emesis Saturday.  Patient mention to daughter in March that he suffered from dark stools occasionally at some unknown time.  Presents with lactic acid level 2.7, tachycardic in 140s, and white blood count of 17,000.  Hemglobin 13.5 at time of presentation.  Confirmed with daughter present that he wants to remain full code.        Review of Systems   Otherwise complete ROS reviewed and negative except as mentioned in the HPI.    Past Medical History:   Past Medical History:   Diagnosis Date   • Stroke (HCC)      Past Surgical History:  Past Surgical History:   Procedure Laterality Date   • PEG TUBE INSERTION Left      Social History:  reports that he has never smoked. He has never used smokeless tobacco. He reports that he does not drink alcohol and does not use drugs.    Family History: family history is not on file.       Mother with CHF, rheumatoid arthritis  Father " " of stomach cancer      Allergies:  No Known Allergies    Medications:  Prior to Admission medications    Medication Sig Start Date End Date Taking? Authorizing Provider   docusate sodium (COLACE) 100 MG capsule Take 100 mg by mouth 2 (Two) Times a Day.   Yes Erica Izaguirre MD   FLUoxetine (PROzac) 20 MG capsule Take 20 mg by mouth Daily.   Yes Erica Izaguirre MD   melatonin 5 MG tablet tablet Take 5 mg by mouth Every Night.   Yes Erica Izaguirre MD   metoclopramide (REGLAN) 5 MG tablet Take 5 mg by mouth 4 (Four) Times a Day.   Yes Erica Izaguirre MD   nicotine (NICODERM CQ) 14 MG/24HR patch Place 1 patch on the skin as directed by provider Daily. 7mg to start on 22 Yes Erica Izaguirre MD   omeprazole (priLOSEC) 20 MG capsule Take 20 mg by mouth Daily.   Yes Erica Izaugirre MD   polyethylene glycol (MIRALAX) 17 g packet Take 17 g by mouth Daily.   Yes Erica Izaguirre MD   rosuvastatin (CRESTOR) 20 MG tablet Take 40 mg by mouth Every Night.   Yes Erica Izaguirre MD   lactulose (CHRONULAC) 10 GM/15ML solution Take 20 g by mouth As Needed.    Erica Izaguirre MD   magnesium hydroxide (MILK OF MAGNESIA) 400 MG/5ML suspension Take  by mouth Daily As Needed for Constipation.    Erica Izaguirre MD     I have utilized all available immediate resources to obtain, update, and review the patient's current medications.    Objective     Vital Signs: BP 96/62 (BP Location: Left arm, Patient Position: Lying)   Pulse (!) 126   Temp 97.9 °F (36.6 °C) (Temporal)   Resp 20   Ht 182.9 cm (72.01\")   Wt 70.9 kg (156 lb 6.4 oz)   SpO2 93%   BMI 21.21 kg/m²    Vitals:    22 0005 22 0040 22 0046 22 0317   BP: 90/62 112/62  96/62   BP Location: Left arm Right arm  Left arm   Patient Position: Lying Lying  Lying   Pulse: (!) 129 (!) 127 (!) 128 (!) 126   Resp: 20 20  20   Temp:  97.3 °F (36.3 °C)  97.9 °F (36.6 °C)   TempSrc:  " "Temporal  Temporal   SpO2: 93% 95%  93%   Weight:  70.9 kg (156 lb 6.4 oz)     Height:  182.9 cm (72.01\")       Physical Exam  Constitutional:       Appearance: He is ill-appearing and toxic-appearing.      Comments: Thin, cachectic   HENT:      Head: Normocephalic and atraumatic.      Right Ear: External ear normal.      Left Ear: External ear normal.      Nose: Nose normal.      Mouth/Throat:      Mouth: Mucous membranes are moist.      Pharynx: Oropharynx is clear.   Eyes:      Conjunctiva/sclera: Conjunctivae normal.   Cardiovascular:      Rate and Rhythm: Regular rhythm. Tachycardia present.   Pulmonary:      Effort: Pulmonary effort is normal.      Breath sounds: Normal breath sounds.   Abdominal:      General: Abdomen is flat. Bowel sounds are normal.      Palpations: Abdomen is soft.   Musculoskeletal:         General: Normal range of motion.      Cervical back: Normal range of motion and neck supple.   Skin:     General: Skin is warm and dry.      Capillary Refill: Capillary refill takes less than 2 seconds.   Neurological:      General: No focal deficit present.      Mental Status: He is alert and oriented to person, place, and time. Mental status is at baseline.   Psychiatric:         Mood and Affect: Mood normal.         Behavior: Behavior normal.         Thought Content: Thought content normal.         Judgment: Judgment normal.              Results Reviewed:  Lab Results (last 24 hours)     Procedure Component Value Units Date/Time    CBC & Differential [[id number]]  (Abnormal) Collected: 06/12/22 0333    Specimen: Blood Updated: 06/12/22 0409    Narrative:      The following orders were created for panel order CBC & Differential.  Procedure                               Abnormality         Status                     ---------                               -----------         ------                     CBC Auto Differential[[id number]]        Abnormal            Final result               Scan " Slide[[id number]]                                                                    Please view results for these tests on the individual orders.    CBC Auto Differential [[id number]]  (Abnormal) Collected: 06/12/22 0333    Specimen: Blood Updated: 06/12/22 0409     WBC 15.66 10*3/mm3      RBC 4.59 10*6/mm3      Hemoglobin 12.6 g/dL      Hematocrit 37.8 %      MCV 82.4 fL      MCH 27.5 pg      MCHC 33.3 g/dL      RDW 14.2 %      RDW-SD 41.6 fl      MPV 9.6 fL      Platelets 323 10*3/mm3      Neutrophil % 86.5 %      Lymphocyte % 6.1 %      Monocyte % 5.1 %      Eosinophil % 1.1 %      Basophil % 0.6 %      Immature Grans % 0.6 %      Neutrophils, Absolute 13.54 10*3/mm3      Lymphocytes, Absolute 0.96 10*3/mm3      Monocytes, Absolute 0.80 10*3/mm3      Eosinophils, Absolute 0.18 10*3/mm3      Basophils, Absolute 0.09 10*3/mm3      Immature Grans, Absolute 0.09 10*3/mm3      nRBC 0.0 /100 WBC     Basic Metabolic Panel [[id number]]  (Abnormal) Collected: 06/12/22 0333    Specimen: Blood Updated: 06/12/22 0408     Glucose 147 mg/dL      BUN 59 mg/dL      Creatinine 1.56 mg/dL      Sodium 135 mmol/L      Potassium 5.1 mmol/L      Chloride 98 mmol/L      CO2 22.0 mmol/L      Calcium 9.6 mg/dL      BUN/Creatinine Ratio 37.8     Anion Gap 15.0 mmol/L      eGFR 46.9 mL/min/1.73      Comment: National Kidney Foundation and American Society of Nephrology (ASN) Task Force recommended calculation based on the Chronic Kidney Disease Epidemiology Collaboration (CKD-EPI) equation refit without adjustment for race.       Narrative:      GFR Normal >60  Chronic Kidney Disease <60  Kidney Failure <15      Magnesium [[id number]]  (Normal) Collected: 06/12/22 0333    Specimen: Blood Updated: 06/12/22 0408     Magnesium 2.3 mg/dL     Blood Culture - Blood, Arm, Left [[id number]] Collected: 06/12/22 0333    Specimen: Blood from Arm, Left Updated: 06/12/22 0342    Blood Culture - Blood, Hand, Left [[id number]] Collected: 06/12/22 0333     Specimen: Blood from Hand, Left Updated: 06/12/22 0342    CRE Screen by PCR - Swab, Large Intestine, Rectum [[id number]] Collected: 06/12/22 0050    Specimen: Swab from Large Intestine, Rectum Updated: 06/12/22 0304     CRE SCREEN Not Detected     Comment: Test performed by real-time polymerase chain reaction (qPCR).        OXA 48 Strain Not Detected     IMP STRAIN Not Detected     VIM STRAIN Not Detected     NDM Strain Not Detected     KPC Strain Not Detected    Urinalysis With Microscopic If Indicated (No Culture) - Urine, Catheter [[id number]]  (Abnormal) Collected: 06/11/22 2319    Specimen: Urine, Catheter Updated: 06/12/22 0006     Color, UA Tornillo     Appearance, UA Turbid     pH, UA 8.0     Specific Gravity, UA 1.018     Glucose, UA Negative     Ketones, UA Negative     Bilirubin, UA Small (1+)     Blood, UA Large (3+)     Protein, UA >=300 mg/dL (3+)     Leuk Esterase, UA Large (3+)     Nitrite, UA Negative     Urobilinogen, UA 0.2 E.U./dL    Urinalysis, Microscopic Only - Urine, Catheter [[id number]]  (Abnormal) Collected: 06/11/22 2319    Specimen: Urine, Catheter Updated: 06/12/22 0006     RBC, UA 21-30 /HPF      WBC, UA Too Numerous to Count /HPF      Bacteria, UA 3+ /HPF      Squamous Epithelial Cells, UA       Unable to determine due to loaded field     /HPF     Hyaline Casts, UA       Unable to determine due to loaded field     /LPF     Triple Phosphate Crystals, UA Small/1+ /HPF      Methodology Manual Light Microscopy    STAT Lactic Acid, Reflex [[id number]]  (Normal) Collected: 06/11/22 2219    Specimen: Blood Updated: 06/11/22 2238     Lactate 1.9 mmol/L     Comprehensive Metabolic Panel [[id number]]  (Abnormal) Collected: 06/11/22 1914    Specimen: Blood Updated: 06/11/22 1936     Glucose 187 mg/dL      BUN 56 mg/dL      Creatinine 1.74 mg/dL      Sodium 134 mmol/L      Potassium 5.3 mmol/L      Chloride 96 mmol/L      CO2 20.0 mmol/L      Calcium 9.9 mg/dL      Total Protein 7.0 g/dL      Albumin  3.70 g/dL      ALT (SGPT) 83 U/L      AST (SGOT) 29 U/L      Alkaline Phosphatase 84 U/L      Total Bilirubin 0.8 mg/dL      Globulin 3.3 gm/dL      A/G Ratio 1.1 g/dL      BUN/Creatinine Ratio 32.2     Anion Gap 18.0 mmol/L      eGFR 41.1 mL/min/1.73      Comment: National Kidney Foundation and American Society of Nephrology (ASN) Task Force recommended calculation based on the Chronic Kidney Disease Epidemiology Collaboration (CKD-EPI) equation refit without adjustment for race.       Narrative:      GFR Normal >60  Chronic Kidney Disease <60  Kidney Failure <15      Magnesium [[id number]]  (Normal) Collected: 06/11/22 1914    Specimen: Blood Updated: 06/11/22 1936     Magnesium 2.2 mg/dL     Lactic Acid, Plasma [[id number]]  (Abnormal) Collected: 06/11/22 1914    Specimen: Blood Updated: 06/11/22 1933     Lactate 2.7 mmol/L     COVID-19 and FLU A/B PCR - Swab, Nasopharynx [[id number]]  (Normal) Collected: 06/11/22 1905    Specimen: Swab from Nasopharynx Updated: 06/11/22 1931     COVID19 Not Detected     Influenza A PCR Not Detected     Influenza B PCR Not Detected    Narrative:      Fact sheet for providers: https://www.fda.gov/media/522526/download    Fact sheet for patients: https://www.fda.gov/media/279153/download    Test performed by PCR.    CBC & Differential [[id number]]  (Abnormal) Collected: 06/11/22 1914    Specimen: Blood Updated: 06/11/22 1922    Narrative:      The following orders were created for panel order CBC & Differential.  Procedure                               Abnormality         Status                     ---------                               -----------         ------                     CBC Auto Differential[[id number]]        Abnormal            Final result                 Please view results for these tests on the individual orders.    CBC Auto Differential [[id number]]  (Abnormal) Collected: 06/11/22 1914    Specimen: Blood Updated: 06/11/22 1922     WBC 16.57 10*3/mm3      RBC 4.93  10*6/mm3      Hemoglobin 13.5 g/dL      Hematocrit 40.2 %      MCV 81.5 fL      MCH 27.4 pg      MCHC 33.6 g/dL      RDW 14.2 %      RDW-SD 41.9 fl      MPV 9.6 fL      Platelets 441 10*3/mm3      Neutrophil % 89.4 %      Lymphocyte % 3.3 %      Monocyte % 6.6 %      Eosinophil % 0.0 %      Basophil % 0.2 %      Immature Grans % 0.5 %      Neutrophils, Absolute 14.81 10*3/mm3      Lymphocytes, Absolute 0.54 10*3/mm3      Monocytes, Absolute 1.09 10*3/mm3      Eosinophils, Absolute 0.00 10*3/mm3      Basophils, Absolute 0.04 10*3/mm3      Immature Grans, Absolute 0.09 10*3/mm3      nRBC 0.0 /100 WBC         Imaging Results (Last 24 Hours)     Procedure Component Value Units Date/Time    CT Abdomen Pelvis Without Contrast [[id number]] Collected: 06/11/22 2102     Updated: 06/11/22 2211    Narrative:      EXAM DESCRIPTION:  CT ABDOMEN PELVIS WO CONTRAST  RadLex: CT ABDOMEN PELVIS WITHOUT IV CONTRAST     CLINICAL HISTORY:   72 years  Male;  abd pain, n/v    TECHNOLOGIST NOTES:    TECHNIQUE: Helical CT imaging was obtained of the abdomen and  pelvis with multiplanar reconstructions. This exam was performed  according to our departmental dose-optimization program, which  includes automated exposure control, adjustment of the mA and/or  kV according to patient size and/or use of iterative  reconstruction techniques.     COMPARISON: None currently available    FINDINGS:   Abdomen:  No evidence of acute disease in the visualized lung bases.    There is a PEG tube.  The liver, spleen, pancreas, adrenal glands  and kidneys show no acute abnormality. No evidence of acute  pancreatitis.    There are no calcified gallstones. There is no  gallbladder wall thickening. No pericholecystic fluid.  There is  no gross biliary duct dilatation.  There are no renal stones.  There is moderately severe bilateral hydronephrosis and  hydroureter.      No free air.    There is no significant free fluid.    There is  no significant aneurysmal  enlargement of the abdominal aorta.    Pelvis:  There is no evidence of bowel obstruction.    No herniated bowel  loops.  . No focal inflammatory changes . No evidence of  appendicitis. There is colonic diverticulosis but no focal  diverticulitis.  Evaluation of the bowel is limited when there is  no oral contrast or when the bowel is incompletely opacified with  enteric contrast.. There is no significant free fluid in the  pelvis. The bladder is distended. There is a Hester catheter in  place..          Impression:      1.  Moderately severe bilateral hydronephrosis and hydroureter.  The bladder is distended.  2.  No bowel obstruction, herniated bowel loops or focal  inflammatory changes.  3.  Colonic diverticulosis    Electronically signed by:  Rakesh May MD  6/11/2022 10:09 PM CDT  Workstation: [id number]        I have personally reviewed and interpreted the radiology studies and ECG obtained at time of admission.     Assessment / Plan     Assessment:   Active Hospital Problems    Diagnosis    • Sepsis (HCC)    • Sepsis, due to unspecified organism, unspecified whether acute organ dysfunction present (HCC)      Plan:   Sepsis due to genitourinary infection patient with chronic indwelling Hester:  - Vancomycin/cefepime, Hester changed in emergency room, consult urology, blood cultures, urine culture, follow fever and white blood curve closely.    Lactic acidosis: Cautious rehydration then recheck    GI bleed- serial H&H's, type and screen, Protonix IV, consult GI, low threshold to transfuse if patient's hemoglobin falls below 8 with active bleeding.      Nutrition: Consult nutrition for peg tube nutrition advised.  Start empirical Jevity overnight.    Tobacco cessation: Nicotine patch  FEN clear liquid diet, and all feeding through PEG tube  PPX SCDs    Code Status/Advanced Care Plan: Full    The patient's surrogate decision maker is patient's daughter.     I discussed my findings and recommendations with the patient  and patient's daughter.    Estimated length of stay is 1 to 4 days.     The patient was seen and examined by me on 6/11/2022 at 2350.    Electronically signed by Mika Felix MD, 06/12/22, 06:36 CDT.

## 2022-06-12 NOTE — PROGRESS NOTES
"  Pharmacokinetics by Pharmacy - Vancomycin    Fer Ivan is a 72 y.o. male   [Ht: 182.9 cm (72.01\"); Wt: 70.9 kg (156 lb 6.4 oz)] is being started on Vancomycin for sepsis for 7 days. Pt was also started on Ceftriaxone for UTI. Pt has chronic indwelling Hester, which was changed in ER.    Estimated Creatinine Clearance: 42.9 mL/min (A) (by C-G formula based on SCr of 1.56 mg/dL (H)).   Creatinine   Date Value Ref Range Status   06/12/2022 1.56 (H) 0.76 - 1.27 mg/dL Final   06/11/2022 1.74 (H) 0.76 - 1.27 mg/dL Final      Lab Results   Component Value Date    WBC 15.66 (H) 06/12/2022    WBC 16.57 (H) 06/11/2022      Temp Readings from Last 1 Encounters:   06/12/22 98.2 °F (36.8 °C) (Temporal)      Lactate   Date Value Ref Range Status   06/11/2022 1.9 0.5 - 2.0 mmol/L Final   06/11/2022 2.7 (C) 0.5 - 2.0 mmol/L Final       Indication for use: sepsis, d/t UTI     Baseline culture results:  Microbiology Results (last 10 days)       Procedure Component Value - Date/Time    CRE Screen by PCR - Swab, Large Intestine, Rectum [[id number]] Collected: 06/12/22 0050    Lab Status: Final result Specimen: Swab from Large Intestine, Rectum Updated: 06/12/22 0304     CRE SCREEN Not Detected     Comment: Test performed by real-time polymerase chain reaction (qPCR).        OXA 48 Strain Not Detected     IMP STRAIN Not Detected     VIM STRAIN Not Detected     NDM Strain Not Detected     KPC Strain Not Detected    COVID-19 and FLU A/B PCR - Swab, Nasopharynx [[id number]]  (Normal) Collected: 06/11/22 1905    Lab Status: Final result Specimen: Swab from Nasopharynx Updated: 06/11/22 1931     COVID19 Not Detected     Influenza A PCR Not Detected     Influenza B PCR Not Detected    Narrative:      Fact sheet for providers: https://www.fda.gov/media/654521/download    Fact sheet for patients: https://www.fda.gov/media/299790/download    Test performed by PCR.          All cultures are pending    Assessment/Plan  Initiated Vancomycin 1000 " mg x1 dose today at 0504 then dosed at 1000 mg IVPB Q24H. Will order Vancomycin peak level 6/14 at 0700 and  trough level 6/15 at 0430.  Calculated AUC is 503.33 (goal 400-600), calculated trough level is 12.74  (goal 10-20) and calculated peak level is 31.99 (goal 30-40).  Pharmacy will monitor renal function and adjust dose accordingly.    Ning Smart, PharmD  06/12/22 08:24 CDT

## 2022-06-12 NOTE — PLAN OF CARE
Goal Outcome Evaluation:  Plan of Care Reviewed With: other (see comments) (RN)        Progress: no change  Outcome Evaluation: Initial assessment.  Clear Liquids ordered.  Orders for Isosource 1.5 starting at 30cc/hr and advancing to goal rate of 60cc/hr.  Advance diet and tube feeding as tolerated.

## 2022-06-13 LAB
ANION GAP SERPL CALCULATED.3IONS-SCNC: 8 MMOL/L (ref 5–15)
BACTERIA SPEC AEROBE CULT: NORMAL
BASOPHILS # BLD AUTO: 0.03 10*3/MM3 (ref 0–0.2)
BASOPHILS NFR BLD AUTO: 0.3 % (ref 0–1.5)
BUN SERPL-MCNC: 44 MG/DL (ref 8–23)
BUN/CREAT SERPL: 56.4 (ref 7–25)
CALCIUM SPEC-SCNC: 8.3 MG/DL (ref 8.6–10.5)
CHLORIDE SERPL-SCNC: 104 MMOL/L (ref 98–107)
CO2 SERPL-SCNC: 25 MMOL/L (ref 22–29)
CREAT SERPL-MCNC: 0.78 MG/DL (ref 0.76–1.27)
DEPRECATED RDW RBC AUTO: 43.7 FL (ref 37–54)
EGFRCR SERPLBLD CKD-EPI 2021: 94.8 ML/MIN/1.73
EOSINOPHIL # BLD AUTO: 0.04 10*3/MM3 (ref 0–0.4)
EOSINOPHIL NFR BLD AUTO: 0.3 % (ref 0.3–6.2)
ERYTHROCYTE [DISTWIDTH] IN BLOOD BY AUTOMATED COUNT: 14 % (ref 12.3–15.4)
GLUCOSE SERPL-MCNC: 128 MG/DL (ref 65–99)
HCT VFR BLD AUTO: 27.6 % (ref 37.5–51)
HCT VFR BLD AUTO: 27.9 % (ref 37.5–51)
HCT VFR BLD AUTO: 28.2 % (ref 37.5–51)
HGB BLD-MCNC: 9.2 G/DL (ref 13–17.7)
HGB BLD-MCNC: 9.2 G/DL (ref 13–17.7)
HGB BLD-MCNC: 9.4 G/DL (ref 13–17.7)
HOLD SPECIMEN: NORMAL
IMM GRANULOCYTES # BLD AUTO: 0.08 10*3/MM3 (ref 0–0.05)
IMM GRANULOCYTES NFR BLD AUTO: 0.7 % (ref 0–0.5)
LYMPHOCYTES # BLD AUTO: 0.92 10*3/MM3 (ref 0.7–3.1)
LYMPHOCYTES NFR BLD AUTO: 7.9 % (ref 19.6–45.3)
MAGNESIUM SERPL-MCNC: 2 MG/DL (ref 1.6–2.4)
MCH RBC QN AUTO: 28.1 PG (ref 26.6–33)
MCHC RBC AUTO-ENTMCNC: 33.3 G/DL (ref 31.5–35.7)
MCV RBC AUTO: 84.4 FL (ref 79–97)
MONOCYTES # BLD AUTO: 0.56 10*3/MM3 (ref 0.1–0.9)
MONOCYTES NFR BLD AUTO: 4.8 % (ref 5–12)
NEUTROPHILS NFR BLD AUTO: 10.02 10*3/MM3 (ref 1.7–7)
NEUTROPHILS NFR BLD AUTO: 86 % (ref 42.7–76)
NRBC BLD AUTO-RTO: 0 /100 WBC (ref 0–0.2)
PLATELET # BLD AUTO: 229 10*3/MM3 (ref 140–450)
PMV BLD AUTO: 9.8 FL (ref 6–12)
POTASSIUM SERPL-SCNC: 4 MMOL/L (ref 3.5–5.2)
RBC # BLD AUTO: 3.27 10*6/MM3 (ref 4.14–5.8)
SODIUM SERPL-SCNC: 137 MMOL/L (ref 136–145)
WBC NRBC COR # BLD: 11.65 10*3/MM3 (ref 3.4–10.8)
WHOLE BLOOD HOLD SPECIMEN: NORMAL

## 2022-06-13 PROCEDURE — 25010000002 MORPHINE PER 10 MG: Performed by: INTERNAL MEDICINE

## 2022-06-13 PROCEDURE — 85014 HEMATOCRIT: CPT | Performed by: INTERNAL MEDICINE

## 2022-06-13 PROCEDURE — 80048 BASIC METABOLIC PNL TOTAL CA: CPT | Performed by: INTERNAL MEDICINE

## 2022-06-13 PROCEDURE — 25010000002 VANCOMYCIN 10 G RECONSTITUTED SOLUTION: Performed by: INTERNAL MEDICINE

## 2022-06-13 PROCEDURE — 85025 COMPLETE CBC W/AUTO DIFF WBC: CPT | Performed by: INTERNAL MEDICINE

## 2022-06-13 PROCEDURE — 25010000002 CEFTRIAXONE PER 250 MG: Performed by: INTERNAL MEDICINE

## 2022-06-13 PROCEDURE — 85018 HEMOGLOBIN: CPT | Performed by: INTERNAL MEDICINE

## 2022-06-13 PROCEDURE — 83735 ASSAY OF MAGNESIUM: CPT | Performed by: INTERNAL MEDICINE

## 2022-06-13 PROCEDURE — 25010000002 VANCOMYCIN 10 G RECONSTITUTED SOLUTION: Performed by: PHYSICIAN ASSISTANT

## 2022-06-13 RX ADMIN — VANCOMYCIN HYDROCHLORIDE 1000 MG: 10 INJECTION, POWDER, LYOPHILIZED, FOR SOLUTION INTRAVENOUS at 14:53

## 2022-06-13 RX ADMIN — Medication 10 ML: at 08:56

## 2022-06-13 RX ADMIN — CEFTRIAXONE SODIUM 2 G: 2 INJECTION, POWDER, FOR SOLUTION INTRAMUSCULAR; INTRAVENOUS at 04:50

## 2022-06-13 RX ADMIN — VANCOMYCIN HYDROCHLORIDE 1000 MG: 10 INJECTION, POWDER, LYOPHILIZED, FOR SOLUTION INTRAVENOUS at 05:36

## 2022-06-13 RX ADMIN — PANTOPRAZOLE SODIUM 40 MG: 40 INJECTION, POWDER, FOR SOLUTION INTRAVENOUS at 20:38

## 2022-06-13 RX ADMIN — Medication 10 ML: at 20:38

## 2022-06-13 RX ADMIN — PANTOPRAZOLE SODIUM 40 MG: 40 INJECTION, POWDER, FOR SOLUTION INTRAVENOUS at 08:56

## 2022-06-13 RX ADMIN — MORPHINE SULFATE 2 MG: 2 INJECTION, SOLUTION INTRAMUSCULAR; INTRAVENOUS at 20:40

## 2022-06-13 RX ADMIN — NICOTINE 1 PATCH: 21 PATCH, EXTENDED RELEASE TRANSDERMAL at 08:57

## 2022-06-13 NOTE — PROGRESS NOTES
[facility] Medicine Services  INPATIENT PROGRESS NOTE    Length of Stay: 2  Date of Admission: 6/11/2022  Primary Care Physician: Estrada Fuentes MD    Subjective   Chief Complaint: Bloody stool, abdominal pain  HPI: Patient states that he is feeling fair.  No specific pain.  No shortness of breath.    Review of Systems   Unable to perform ROS: Other   Poor historian after stroke.      Objective    Temp:  [96.4 °F (35.8 °C)-97.9 °F (36.6 °C)] 97.8 °F (36.6 °C)  Heart Rate:  [102-120] 110  Resp:  [20] 20  BP: ()/(55-61) 117/61    Physical Exam  Vitals reviewed.   Constitutional:       Appearance: He is well-developed. He is ill-appearing.   HENT:      Head: Normocephalic and atraumatic.   Eyes:      Pupils: Pupils are equal, round, and reactive to light.   Cardiovascular:      Rate and Rhythm: Regular rhythm. Tachycardia present.      Heart sounds: Normal heart sounds. No murmur heard.    No friction rub. No gallop.   Pulmonary:      Effort: Pulmonary effort is normal. No respiratory distress.      Breath sounds: Normal breath sounds. No wheezing or rales.   Chest:      Chest wall: No tenderness.   Abdominal:      General: Bowel sounds are normal. There is no distension.      Palpations: Abdomen is soft.      Tenderness: There is no abdominal tenderness.   Musculoskeletal:      Cervical back: Normal range of motion and neck supple.   Psychiatric:         Behavior: Behavior normal.       Results Review:  I have reviewed the labs, radiology results, and diagnostic studies.    Laboratory Data:   Results from last 7 days   Lab Units 06/13/22  0626 06/12/22  0333 06/11/22  1914   SODIUM mmol/L 137 135* 134*   POTASSIUM mmol/L 4.0 5.1 5.3*   CHLORIDE mmol/L 104 98 96*   CO2 mmol/L 25.0 22.0 20.0*   BUN mg/dL 44* 59* 56*   CREATININE mg/dL 0.78 1.56* 1.74*   GLUCOSE mg/dL 128* 147* 187*   CALCIUM mg/dL 8.3* 9.6 9.9   BILIRUBIN mg/dL  --   --  0.8   ALK PHOS U/L  --    --  84   ALT (SGPT) U/L  --   --  83*   AST (SGOT) U/L  --   --  29   ANION GAP mmol/L 8.0 15.0 18.0*     Estimated Creatinine Clearance: 90.8 mL/min (by C-G formula based on SCr of 0.78 mg/dL).  Results from last 7 days   Lab Units 06/13/22  0626 06/12/22  0333 06/11/22  1914   MAGNESIUM mg/dL 2.0 2.3 2.2         Results from last 7 days   Lab Units 06/13/22  0747 06/13/22  0626 06/12/22  1942 06/12/22 0333 06/11/22  1914   WBC 10*3/mm3  --  11.65*  --  15.66* 16.57*   HEMOGLOBIN g/dL 9.2* 9.2* 10.3* 12.6* 13.5   HEMATOCRIT % 27.9* 27.6* 30.8* 37.8 40.2   PLATELETS 10*3/mm3  --  229  --  323 441           Culture Data:   Blood Culture   Date Value Ref Range Status   06/12/2022 No growth at 24 hours  Preliminary   06/12/2022 No growth at 24 hours  Preliminary     Urine Culture   Date Value Ref Range Status   06/11/2022 >100,000 CFU/mL Mixed Mairlou Isolated  Final     No results found for: RESPCX  No results found for: WOUNDCX  No results found for: STOOLCX  No components found for: BODYFLD    Radiology Data:   Imaging Results (Last 24 Hours)     ** No results found for the last 24 hours. **          I have reviewed the patient's current medications.     Assessment/Plan     Active Hospital Problems    Diagnosis    • Sepsis (HCC)    • Sepsis, due to unspecified organism, unspecified whether acute organ dysfunction present (HCC)        Plan:  1.  Sepsis: Secondary to urinary tract infection.  Supportive care.  Aggressive fluid resuscitation.  2.  Urinary tract infection: Likely secondary to chronic indwelling Hester.  Hester was changed in the emergency department.  Patient with bilateral hydronephrosis and hydroureter prior to Hester being changed.  CBI going.  Blood and urine cultures are pending.  Continue broad-spectrum antibiotics for now..  3.  GI bleed:  Continue IV Protonix.  Hemoglobin stable  4.  Nutrition: Patient with significant dysphagia requiring tube feedings.  Continue tube feedings.  5.  Acute kidney  injury: Resolved with hydration.  6.  DVT prophylaxis: SCDs.    The patient was evaluated during the global COVID-19 pandemic, and the diagnosis was suspected/considered upon their initial presentation.  Evaluation, treatment, and testing were consistent with current guidelines for patients who present with complaints or symptoms that may be related to COVID-19.    I confirmed that the patient's Advance Care Plan is present, code status is documented, or surrogate decision maker is listed in the patient's medical record.       Discharge Planning: I expect patient to be discharged to home versus skilled facility in 3-4 days.        This document has been electronically signed by Rg Baer MD on June 13, 2022 14:23 CDT

## 2022-06-13 NOTE — CONSULTS
Adult Nutrition  Assessment    Patient Name:  Fre Ivan  YOB: 1950  MRN: [id number]  Admit Date:  6/11/2022    Assessment Date:  6/13/2022    Comments:  RD f/u. Pt receiving EN at goal rate of 60ml/hr providing 2160cal and 97grams protein---meeting needs. RN reports pt w/good tolerance at goal rate so far. Pt did eat some broth at breakfast this am. Will cont to monitor course.      Reason for Assessment     Row Name 06/13/22 1128          Reason for Assessment    Reason For Assessment follow-up protocol;TF/PN                Nutrition/Diet History     Row Name 06/13/22 1129          Nutrition/Diet History    Typical Intake (Food/Fluid/EN/PN) Per RN, pt is tolerating TF at goal rate and she says he was able to take some broth this am as well.                Anthropometrics     Row Name 06/13/22 0557          Anthropometrics    Weight 75 kg (165 lb 6.4 oz)                Labs/Tests/Procedures/Meds     Row Name 06/13/22 1130          Labs/Procedures/Meds    Lab Results Reviewed reviewed, pertinent     Lab Results Comments Gl 128, BUN 44, ALT 83            Medications    Pertinent Medications Reviewed reviewed                  Estimated/Assessed Needs - Anthropometrics     Row Name 06/13/22 0557          Anthropometrics    Weight 75 kg (165 lb 6.4 oz)                Nutrition Prescription Ordered     Row Name 06/13/22 1130          Nutrition Prescription PO    Current PO Diet Clear Liquid            Nutrition Prescription EN    Enteral Route PEG     Product Isosource 1.5 (Jevity 1.5)     TF Delivery Method Continuous     Continuous TF Goal Rate (mL/hr) 60 mL/hr     Continuous TF Current Rate (mL/hr) 60 mL/hr     Continuous TF Goal Volume (mL) 1440 mL     Continuous TF Current Volume (mL) 1440 mL                Evaluation of Received Nutrient/Fluid Intake     Row Name 06/13/22 1131          Calories Evaluation    Enteral Calories (kcal) 2160            Protein Evaluation    Enteral Protein (g) 97             Intake Assessment    Energy/Calorie Requirement Assessment meeting needs     Protein Requirement Assessment meeting needs     Tolerance tolerating                     Electronically signed by:  Sandra Pyle RD  06/13/22 11:36 CDT

## 2022-06-13 NOTE — PLAN OF CARE
Goal Outcome Evaluation:              Outcome Evaluation: Pt tolerating TF at goal rate 60ml/hr. Nursing reports pt ate broth at breakfast today. RD will con to monitor course.

## 2022-06-13 NOTE — PROGRESS NOTES
"Pharmacokinetics by Pharmacy - Vancomycin    Fer Ivan is a 72 y.o. male receiving vancomycin 1000mg IV Q24H day 2 for sepsis  Patient is also receiving ceftriaxone    Objective:  [Ht: 182.9 cm (72.01\"); Wt: 75 kg (165 lb 6.4 oz)]     WBC   Date Value Ref Range Status   06/13/2022 11.65 (H) 3.40 - 10.80 10*3/mm3 Final   06/12/2022 15.66 (H) 3.40 - 10.80 10*3/mm3 Final   06/11/2022 16.57 (H) 3.40 - 10.80 10*3/mm3 Final      Lactate   Date Value Ref Range Status   06/12/2022 1.2 0.5 - 2.0 mmol/L Final   06/11/2022 1.9 0.5 - 2.0 mmol/L Final   06/11/2022 2.7 (C) 0.5 - 2.0 mmol/L Final      Temp Readings from Last 1 Encounters:   06/13/22 97.8 °F (36.6 °C) (Infrared)     Estimated Creatinine Clearance: 90.8 mL/min (by C-G formula based on SCr of 0.78 mg/dL).   Creatinine   Date Value Ref Range Status   06/13/2022 0.78 0.76 - 1.27 mg/dL Final   06/12/2022 1.56 (H) 0.76 - 1.27 mg/dL Final   06/11/2022 1.74 (H) 0.76 - 1.27 mg/dL Final       No results found for: VANCOPEAK, VANCOTROUGH, VANCORANDOM    Culture Results:  Microbiology Results (last 10 days)     Procedure Component Value - Date/Time    Blood Culture - Blood, Hand, Left [[id number]]  (Normal) Collected: 06/12/22 0333    Lab Status: Preliminary result Specimen: Blood from Hand, Left Updated: 06/13/22 0347     Blood Culture No growth at 24 hours    Blood Culture - Blood, Arm, Left [[id number]]  (Normal) Collected: 06/12/22 0333    Lab Status: Preliminary result Specimen: Blood from Arm, Left Updated: 06/13/22 0347     Blood Culture No growth at 24 hours    CRE Screen by PCR - Swab, Large Intestine, Rectum [[id number]] Collected: 06/12/22 0050    Lab Status: Final result Specimen: Swab from Large Intestine, Rectum Updated: 06/12/22 0304     CRE SCREEN Not Detected     Comment: Test performed by real-time polymerase chain reaction (qPCR).        OXA 48 Strain Not Detected     IMP STRAIN Not Detected     VIM STRAIN Not Detected     NDM Strain Not Detected     KPC " Strain Not Detected    COVID-19 and FLU A/B PCR - Swab, Nasopharynx [[id number]]  (Normal) Collected: 06/11/22 1905    Lab Status: Final result Specimen: Swab from Nasopharynx Updated: 06/11/22 1931     COVID19 Not Detected     Influenza A PCR Not Detected     Influenza B PCR Not Detected    Narrative:      Fact sheet for providers: https://www.fda.gov/media/920480/download    Fact sheet for patients: https://www.fda.gov/media/098108/download    Test performed by PCR.        No results found for: RESPCX      Assessment:    WBC 11.65, WNL   Scr 0.78, WNL. Scr was 1.56 yesterday.  Patient is afebrile    6/12 blood culture x2: no growth at 24 hours   6/11 urine culture: results pending      6/11 CT Abdomen Pelvis: moderately severe bilateral hydronephrosis and hydroureter. Bladder is distended. Colonic diverticulitis. No bowel obstruction, herniated bowel loops or focal inflammatory changes.    Will change dose to vancomycin 1000 mg IV Q12H. This gives an estimated peak and trough of 30 (goal 30-40) and 12.4 (goal 10-20). The predicted AUC is 477.3 (goal 400-600).     Plan:  1. Change to vancomycin 1000mg IV Q12H starting 6/13 at 1500.  2. Vancomycin peak 6/14 at 1700 and vancomycin trough 6/15 at 0230.  3. Pharmacy will monitor renal function and adjust dose accordingly.    Irving Dias [facility]   06/13/22 10:34 CDT

## 2022-06-14 PROBLEM — K92.0 HEMATEMESIS WITH NAUSEA: Status: ACTIVE | Noted: 2022-06-11

## 2022-06-14 PROBLEM — R10.13 EPIGASTRIC PAIN: Status: ACTIVE | Noted: 2022-06-11

## 2022-06-14 PROBLEM — D64.9 ABSOLUTE ANEMIA: Status: ACTIVE | Noted: 2022-06-11

## 2022-06-14 LAB
ANION GAP SERPL CALCULATED.3IONS-SCNC: 9 MMOL/L (ref 5–15)
BASOPHILS # BLD AUTO: 0.03 10*3/MM3 (ref 0–0.2)
BASOPHILS NFR BLD AUTO: 0.3 % (ref 0–1.5)
BUN SERPL-MCNC: 28 MG/DL (ref 8–23)
BUN/CREAT SERPL: 48.3 (ref 7–25)
CALCIUM SPEC-SCNC: 8.1 MG/DL (ref 8.6–10.5)
CHLORIDE SERPL-SCNC: 104 MMOL/L (ref 98–107)
CO2 SERPL-SCNC: 23 MMOL/L (ref 22–29)
CREAT SERPL-MCNC: 0.58 MG/DL (ref 0.76–1.27)
DEPRECATED RDW RBC AUTO: 43.6 FL (ref 37–54)
EGFRCR SERPLBLD CKD-EPI 2021: 103.6 ML/MIN/1.73
EOSINOPHIL # BLD AUTO: 0.14 10*3/MM3 (ref 0–0.4)
EOSINOPHIL NFR BLD AUTO: 1.4 % (ref 0.3–6.2)
ERYTHROCYTE [DISTWIDTH] IN BLOOD BY AUTOMATED COUNT: 14 % (ref 12.3–15.4)
GLUCOSE SERPL-MCNC: 136 MG/DL (ref 65–99)
HCT VFR BLD AUTO: 30.1 % (ref 37.5–51)
HGB BLD-MCNC: 9.9 G/DL (ref 13–17.7)
IMM GRANULOCYTES # BLD AUTO: 0.14 10*3/MM3 (ref 0–0.05)
IMM GRANULOCYTES NFR BLD AUTO: 1.4 % (ref 0–0.5)
LYMPHOCYTES # BLD AUTO: 0.73 10*3/MM3 (ref 0.7–3.1)
LYMPHOCYTES NFR BLD AUTO: 7.1 % (ref 19.6–45.3)
MAGNESIUM SERPL-MCNC: 2 MG/DL (ref 1.6–2.4)
MCH RBC QN AUTO: 27.8 PG (ref 26.6–33)
MCHC RBC AUTO-ENTMCNC: 32.9 G/DL (ref 31.5–35.7)
MCV RBC AUTO: 84.6 FL (ref 79–97)
MONOCYTES # BLD AUTO: 0.57 10*3/MM3 (ref 0.1–0.9)
MONOCYTES NFR BLD AUTO: 5.6 % (ref 5–12)
NEUTROPHILS NFR BLD AUTO: 8.66 10*3/MM3 (ref 1.7–7)
NEUTROPHILS NFR BLD AUTO: 84.2 % (ref 42.7–76)
NRBC BLD AUTO-RTO: 0 /100 WBC (ref 0–0.2)
PLATELET # BLD AUTO: 237 10*3/MM3 (ref 140–450)
PMV BLD AUTO: 9.8 FL (ref 6–12)
POTASSIUM SERPL-SCNC: 3.9 MMOL/L (ref 3.5–5.2)
RBC # BLD AUTO: 3.56 10*6/MM3 (ref 4.14–5.8)
SODIUM SERPL-SCNC: 136 MMOL/L (ref 136–145)
VANCOMYCIN PEAK SERPL-MCNC: 40.2 MCG/ML (ref 20–40)
WBC NRBC COR # BLD: 10.27 10*3/MM3 (ref 3.4–10.8)

## 2022-06-14 PROCEDURE — 25010000002 VANCOMYCIN 10 G RECONSTITUTED SOLUTION: Performed by: PHYSICIAN ASSISTANT

## 2022-06-14 PROCEDURE — 80202 ASSAY OF VANCOMYCIN: CPT | Performed by: PHYSICIAN ASSISTANT

## 2022-06-14 PROCEDURE — 25010000002 CEFTRIAXONE PER 250 MG: Performed by: INTERNAL MEDICINE

## 2022-06-14 PROCEDURE — 83735 ASSAY OF MAGNESIUM: CPT | Performed by: INTERNAL MEDICINE

## 2022-06-14 PROCEDURE — 80048 BASIC METABOLIC PNL TOTAL CA: CPT | Performed by: INTERNAL MEDICINE

## 2022-06-14 PROCEDURE — 85025 COMPLETE CBC W/AUTO DIFF WBC: CPT | Performed by: INTERNAL MEDICINE

## 2022-06-14 PROCEDURE — 99222 1ST HOSP IP/OBS MODERATE 55: CPT | Performed by: INTERNAL MEDICINE

## 2022-06-14 RX ORDER — POLYETHYLENE GLYCOL 3350 17 G/17G
1 POWDER, FOR SOLUTION ORAL ONCE
Status: COMPLETED | OUTPATIENT
Start: 2022-06-14 | End: 2022-06-14

## 2022-06-14 RX ADMIN — PANTOPRAZOLE SODIUM 40 MG: 40 INJECTION, POWDER, FOR SOLUTION INTRAVENOUS at 19:47

## 2022-06-14 RX ADMIN — CEFTRIAXONE SODIUM 2 G: 2 INJECTION, POWDER, FOR SOLUTION INTRAMUSCULAR; INTRAVENOUS at 02:47

## 2022-06-14 RX ADMIN — Medication 10 ML: at 08:36

## 2022-06-14 RX ADMIN — NICOTINE 1 PATCH: 21 PATCH, EXTENDED RELEASE TRANSDERMAL at 08:38

## 2022-06-14 RX ADMIN — VANCOMYCIN HYDROCHLORIDE 1000 MG: 10 INJECTION, POWDER, LYOPHILIZED, FOR SOLUTION INTRAVENOUS at 03:26

## 2022-06-14 RX ADMIN — PANTOPRAZOLE SODIUM 40 MG: 40 INJECTION, POWDER, FOR SOLUTION INTRAVENOUS at 08:36

## 2022-06-14 RX ADMIN — Medication 1 BOTTLE: at 17:59

## 2022-06-14 RX ADMIN — VANCOMYCIN HYDROCHLORIDE 1000 MG: 10 INJECTION, POWDER, LYOPHILIZED, FOR SOLUTION INTRAVENOUS at 15:39

## 2022-06-14 NOTE — PROGRESS NOTES
[facility] Medicine Services  INPATIENT PROGRESS NOTE    Length of Stay: 3  Date of Admission: 6/11/2022  Primary Care Physician: Estrada Fuentes MD    Subjective   Chief Complaint: Bloody stool, abdominal pain  HPI: Patient complains of no pain.  Breathing is at baseline.  Does state that he had some pain last night.  Somewhat tearful.    Review of Systems   Unable to perform ROS: Other   Poor historian after stroke.      Objective    Temp:  [96.9 °F (36.1 °C)-97.6 °F (36.4 °C)] 97.2 °F (36.2 °C)  Heart Rate:  [] 98  Resp:  [20] 20  BP: (103-126)/(57-66) 126/66    Physical Exam  Vitals reviewed.   Constitutional:       Appearance: He is well-developed. He is ill-appearing.   HENT:      Head: Normocephalic and atraumatic.   Eyes:      Pupils: Pupils are equal, round, and reactive to light.   Cardiovascular:      Rate and Rhythm: Regular rhythm. Tachycardia present.      Heart sounds: Normal heart sounds. No murmur heard.    No friction rub. No gallop.   Pulmonary:      Effort: Pulmonary effort is normal. No respiratory distress.      Breath sounds: Normal breath sounds. No wheezing or rales.   Chest:      Chest wall: No tenderness.   Abdominal:      General: Bowel sounds are normal. There is no distension.      Palpations: Abdomen is soft.      Tenderness: There is no abdominal tenderness.   Musculoskeletal:      Cervical back: Normal range of motion and neck supple.   Psychiatric:         Behavior: Behavior normal.       Results Review:  I have reviewed the labs, radiology results, and diagnostic studies.    Laboratory Data:   Results from last 7 days   Lab Units 06/14/22  0546 06/13/22  0626 06/12/22  0333 06/11/22  1914   SODIUM mmol/L 136 137 135* 134*   POTASSIUM mmol/L 3.9 4.0 5.1 5.3*   CHLORIDE mmol/L 104 104 98 96*   CO2 mmol/L 23.0 25.0 22.0 20.0*   BUN mg/dL 28* 44* 59* 56*   CREATININE mg/dL 0.58* 0.78 1.56* 1.74*   GLUCOSE mg/dL 136* 128* 147*  187*   CALCIUM mg/dL 8.1* 8.3* 9.6 9.9   BILIRUBIN mg/dL  --   --   --  0.8   ALK PHOS U/L  --   --   --  84   ALT (SGPT) U/L  --   --   --  83*   AST (SGOT) U/L  --   --   --  29   ANION GAP mmol/L 9.0 8.0 15.0 18.0*     Estimated Creatinine Clearance: 122.1 mL/min (A) (by C-G formula based on SCr of 0.58 mg/dL (L)).  Results from last 7 days   Lab Units 06/14/22  0546 06/13/22  0626 06/12/22  0333   MAGNESIUM mg/dL 2.0 2.0 2.3         Results from last 7 days   Lab Units 06/14/22  0546 06/13/22 2010 06/13/22  0747 06/13/22 0626 06/12/22  1942 06/12/22  0333 06/11/22  1914   WBC 10*3/mm3 10.27  --   --  11.65*  --  15.66* 16.57*   HEMOGLOBIN g/dL 9.9* 9.4* 9.2* 9.2* 10.3* 12.6* 13.5   HEMATOCRIT % 30.1* 28.2* 27.9* 27.6* 30.8* 37.8 40.2   PLATELETS 10*3/mm3 237  --   --  229  --  323 441           Culture Data:   Blood Culture   Date Value Ref Range Status   06/12/2022 No growth at 2 days  Preliminary   06/12/2022 No growth at 2 days  Preliminary     Urine Culture   Date Value Ref Range Status   06/11/2022 >100,000 CFU/mL Mixed Marilou Isolated  Final     No results found for: RESPCX  No results found for: WOUNDCX  No results found for: STOOLCX  No components found for: BODYFLD    Radiology Data:   Imaging Results (Last 24 Hours)     ** No results found for the last 24 hours. **          I have reviewed the patient's current medications.     Assessment/Plan     Active Hospital Problems    Diagnosis    • Sepsis (HCC)    • Sepsis, due to unspecified organism, unspecified whether acute organ dysfunction present (HCC)        Plan:  1.  Sepsis: Secondary to urinary tract infection.  Supportive care.  Aggressive fluid resuscitation.  2.  Urinary tract infection: Likely secondary to chronic indwelling Hester.  Hester was changed in the emergency department.  Patient with bilateral hydronephrosis and hydroureter prior to Hester being changed.  CBI going.  Will titrate CBI down.  Blood culture shows no growth x2 days.  Urine  culture was contaminated.  Finished course of broad-spectrum antibiotics.  3.  GI bleed:  Continue IV Protonix.  Hemoglobin stable.  Appreciate GI help.  4.  Nutrition: Patient with significant dysphagia requiring tube feedings.  Continue tube feedings.  5.  Acute kidney injury: Resolved with hydration.  6.  DVT prophylaxis: SCDs.    The patient was evaluated during the global COVID-19 pandemic, and the diagnosis was suspected/considered upon their initial presentation.  Evaluation, treatment, and testing were consistent with current guidelines for patients who present with complaints or symptoms that may be related to COVID-19.    I confirmed that the patient's Advance Care Plan is present, code status is documented, or surrogate decision maker is listed in the patient's medical record.       Discharge Planning: I expect patient to be discharged to home versus skilled facility in 3-4 days.        This document has been electronically signed by Rg Baer MD on June 14, 2022 12:13 CDT

## 2022-06-14 NOTE — PROGRESS NOTES
"Pharmacokinetics by Pharmacy - Vancomycin    Fer Ivan is a 72 y.o. male receiving vancomycin 1000mg IV Q12H day 3 for sepsis  Patient is also receiving ceftriaxone    Objective:  [Ht: 182.9 cm (72.01\"); Wt: 75 kg (165 lb 6.4 oz)]     WBC   Date Value Ref Range Status   06/14/2022 10.27 3.40 - 10.80 10*3/mm3 Final   06/13/2022 11.65 (H) 3.40 - 10.80 10*3/mm3 Final   06/12/2022 15.66 (H) 3.40 - 10.80 10*3/mm3 Final      Lactate   Date Value Ref Range Status   06/12/2022 1.2 0.5 - 2.0 mmol/L Final   06/11/2022 1.9 0.5 - 2.0 mmol/L Final   06/11/2022 2.7 (C) 0.5 - 2.0 mmol/L Final      Temp Readings from Last 1 Encounters:   06/14/22 96.9 °F (36.1 °C) (Temporal)     Estimated Creatinine Clearance: 122.1 mL/min (A) (by C-G formula based on SCr of 0.58 mg/dL (L)).   Creatinine   Date Value Ref Range Status   06/14/2022 0.58 (L) 0.76 - 1.27 mg/dL Final   06/13/2022 0.78 0.76 - 1.27 mg/dL Final   06/12/2022 1.56 (H) 0.76 - 1.27 mg/dL Final       No results found for: VANCOPEAK, VANCOTROUGH, VANCORANDOM    Culture Results:  Microbiology Results (last 10 days)     Procedure Component Value - Date/Time    Blood Culture - Blood, Hand, Left [[id number]]  (Normal) Collected: 06/12/22 0333    Lab Status: Preliminary result Specimen: Blood from Hand, Left Updated: 06/14/22 0346     Blood Culture No growth at 2 days    Blood Culture - Blood, Arm, Left [[id number]]  (Normal) Collected: 06/12/22 0333    Lab Status: Preliminary result Specimen: Blood from Arm, Left Updated: 06/14/22 0347     Blood Culture No growth at 2 days    CRE Screen by PCR - Swab, Large Intestine, Rectum [[id number]] Collected: 06/12/22 0050    Lab Status: Final result Specimen: Swab from Large Intestine, Rectum Updated: 06/12/22 0304     CRE SCREEN Not Detected     Comment: Test performed by real-time polymerase chain reaction (qPCR).        OXA 48 Strain Not Detected     IMP STRAIN Not Detected     VIM STRAIN Not Detected     NDM Strain Not Detected     KPC " Strain Not Detected    Urine Culture - Urine, Urine, Catheter [[id number]] Collected: 06/11/22 2319    Lab Status: Final result Specimen: Urine, Catheter Updated: 06/13/22 1127     Urine Culture >100,000 CFU/mL Mixed Marlena Isolated    Narrative:      Specimen contains mixed organisms of questionable pathogenicity suggestive of contamination. If symptoms persist, suggest recollection.  Colonization of the urinary tract without infection is common. Treatment is discouraged unless the patient is symptomatic, pregnant, or undergoing an invasive urologic procedure.    COVID-19 and FLU A/B PCR - Swab, Nasopharynx [[id number]]  (Normal) Collected: 06/11/22 1905    Lab Status: Final result Specimen: Swab from Nasopharynx Updated: 06/11/22 1931     COVID19 Not Detected     Influenza A PCR Not Detected     Influenza B PCR Not Detected    Narrative:      Fact sheet for providers: https://www.fda.gov/media/959395/download    Fact sheet for patients: https://www.fda.gov/media/167814/download    Test performed by PCR.        No results found for: RESPCX    Assessment:    WBC 10.27, WNL and has trended down  Scr 0.58, low and trending down  Patient is afebrile     6/11 CT Abdomen Pelvis: moderately severe bilateral hydronephrosis and hydroureter. Bladder is distended. Colonic diverticulitis. No bowel obstruction, herniated bowel loops or focal inflammatory changes.    6/12 blood culture x2: no growth at 2 days   6/11 urine culture: mixed marlena     Continue vancomycin 1000 mg IV Q12H. This gives an estimated peak and trough of 30 (goal 30-40) and 12.4 (goal 10-20). The predicted AUC is 477.3 (goal 400-600).     Plan:  1. Continue vancomycin 1000mg IV Q12H  2. Vancomycin peak 6/14 at 1700 and vancomycin trough 6/15 at 0230  3. Pharmacy will monitor renal function and adjust dose accordingly.      Irving Dias [facility]   06/14/22 08:36 CDT

## 2022-06-14 NOTE — CONSULTS
SUBJECTIVE:   6/14/2022    Name: Fer Ivan  DOD: 1950    REASON FOR CONSULT: Abdominal pain with nausea, vomiting, hematemesis and anemia    Chief Complaint:     Chief Complaint   Patient presents with   • Abdominal Pain   • Vomiting   • Nausea       Subjective     Patient is 72 y.o. male with past medical history of CVA with left-sided weakness presents with epigastric pain with nausea, vomiting, coffee-ground emesis and bloody stools.  Patient had CVA with residual weakness and unable to provide much history.  Patient's daughter and POA is on the bedside and stated patient developed abdominal pain last week associated with nausea and vomiting and subsequently had hematemesis with bloody and dark stools.  Hemoglobin today is 9.4.  Denied NSAID usage.  Currently on tube feeds for oropharyngeal dysphagia.  He was noted to have urinary tract infection which has improved currently with antibiotic.     ROS/HISTORY/ CURRENT MEDICATIONS/OBJECTIVE/VS/PE:   Review of Systems:   Review of Systems   Constitutional: Negative for chills, fatigue, fever and unexpected weight change.   HENT: Negative for congestion, ear discharge, hearing loss, nosebleeds and sore throat.    Eyes: Negative for pain, discharge and redness.   Respiratory: Negative for cough, chest tightness, shortness of breath and wheezing.    Cardiovascular: Negative for chest pain and palpitations.   Gastrointestinal: Positive for abdominal pain, nausea and vomiting. Negative for abdominal distention, blood in stool, constipation and diarrhea.   Endocrine: Negative for cold intolerance, polydipsia, polyphagia and polyuria.   Genitourinary: Negative for dysuria, flank pain, frequency, hematuria and urgency.   Musculoskeletal: Negative for arthralgias, back pain, joint swelling and myalgias.   Skin: Negative for color change, pallor and rash.   Neurological: Negative for tremors, seizures, syncope, weakness and headaches.   Hematological: Negative for  adenopathy. Does not bruise/bleed easily.   Psychiatric/Behavioral: Negative for behavioral problems, confusion, dysphoric mood, hallucinations and suicidal ideas. The patient is not nervous/anxious.        History:     Past Medical History:   Diagnosis Date   • Stroke (HCC)      Past Surgical History:   Procedure Laterality Date   • PEG TUBE INSERTION Left      No family history on file.  Social History     Tobacco Use   • Smoking status: Never Smoker   • Smokeless tobacco: Never Used   Vaping Use   • Vaping Use: Never used   Substance Use Topics   • Alcohol use: Never   • Drug use: Never     Medications Prior to Admission   Medication Sig Dispense Refill Last Dose   • docusate sodium (COLACE) 100 MG capsule Take 100 mg by mouth 2 (Two) Times a Day.   6/11/2022 at Unknown time   • FLUoxetine (PROzac) 20 MG capsule Take 20 mg by mouth Daily.   6/11/2022 at Unknown time   • melatonin 5 MG tablet tablet Take 5 mg by mouth Every Night.   6/11/2022 at Unknown time   • metoclopramide (REGLAN) 5 MG tablet Take 5 mg by mouth 4 (Four) Times a Day.   6/11/2022 at Unknown time   • nicotine (NICODERM CQ) 14 MG/24HR patch Place 1 patch on the skin as directed by provider Daily. 7mg to start on 6/21 6/11/2022 at Unknown time   • omeprazole (priLOSEC) 20 MG capsule Take 20 mg by mouth Daily.   6/11/2022 at Unknown time   • polyethylene glycol (MIRALAX) 17 g packet Take 17 g by mouth Daily.   6/12/2022 at Unknown time   • rosuvastatin (CRESTOR) 20 MG tablet Take 40 mg by mouth Every Night.   6/11/2022 at Unknown time   • lactulose (CHRONULAC) 10 GM/15ML solution Take 20 g by mouth As Needed.   Unknown at Unknown time   • magnesium hydroxide (MILK OF MAGNESIA) 400 MG/5ML suspension Take  by mouth Daily As Needed for Constipation.   Unknown at Unknown time     Allergies:  Patient has no known allergies.    I have reviewed the patient's medical history, surgical history and family history in the available medical record system.      Current Medications:     Current Facility-Administered Medications   Medication Dose Route Frequency Provider Last Rate Last Admin   • !Vancomycin Peak Level Draw Needed   Does not apply Once Erin Santiago PA-C       • [START ON 6/15/2022] !Vancomycin Trough Level Draw Needed   Does not apply Once Erin Santiago PA-C       • acetaminophen (TYLENOL) tablet 650 mg  650 mg Oral Q4H PRN Mika Felix MD        Or   • acetaminophen (TYLENOL) 160 MG/5ML solution 650 mg  650 mg Oral Q4H PRN Mika Felix MD        Or   • acetaminophen (TYLENOL) suppository 650 mg  650 mg Rectal Q4H PRN Mika Felix MD       • calcium carbonate (TUMS) chewable tablet 500 mg (200 mg elemental)  1 tablet Oral BID PRN Mika Felix MD       • cefTRIAXone (ROCEPHIN) 2 g/100 mL 0.9% NS IVPB (MBP)  2 g Intravenous Q24H Mika Felix MD   2 g at 06/14/22 0247   • melatonin tablet 5.25 mg  5.25 mg Oral Nightly PRN Mika Felix MD       • morphine injection 2 mg  2 mg Intravenous Q4H PRN Mika Felix MD   2 mg at 06/13/22 2040    And   • naloxone (NARCAN) injection 0.4 mg  0.4 mg Intravenous Q5 Min PRN Mika Felix MD       • nicotine (NICODERM CQ) 21 MG/24HR patch 1 patch  1 patch Transdermal Q24H Mika Felix MD   1 patch at 06/14/22 0838   • ondansetron (ZOFRAN) injection 4 mg  4 mg Intravenous Q6H PRN Mika Felix MD       • pantoprazole (PROTONIX) injection 40 mg  40 mg Intravenous Q12H Mika Felix MD   40 mg at 06/14/22 0836   • Pharmacy to dose vancomycin   Does not apply Continuous PRN Mika Felix MD       • sodium chloride 0.9 % flush 10 mL  10 mL Intravenous Q12H Mika Felix MD   10 mL at 06/14/22 0836   • sodium chloride 0.9 % flush 10 mL  10 mL Intravenous PRN Mika Felix MD       • traMADol (ULTRAM) tablet 50 mg  50 mg Oral Q6H PRN Mika Felix MD       • vancomycin 1000 mg/250 mL 0.9% NS IVPB ([facility])  1,000 mg Intravenous Q12H Erin Santiago PA-C   1,000 mg at 06/14/22 0326       Objective      Physical Exam:   Temp:  [96.9 °F (36.1 °C)-97.6 °F (36.4 °C)] 97.2 °F (36.2 °C)  Heart Rate:  [] 98  Resp:  [20] 20  BP: (103-126)/(57-66) 126/66    Physical Exam:  General Appearance:    Alert, cooperative, in no acute distress   Head:    Normocephalic, without obvious abnormality, atraumatic   Eyes:            Lids and lashes normal, conjunctivae and sclerae normal, no   icterus, no pallor, corneas clear, PERRLA   Ears:    Ears appear intact with no abnormalities noted   Throat:   No oral lesions, no thrush, oral mucosa moist   Neck:   No adenopathy, supple, trachea midline, no thyromegaly, no     carotid bruit, no JVD   Back:     No kyphosis present, no scoliosis present, no skin lesions,       erythema or scars, no tenderness to percussion or                   palpation,   range of motion normal   Lungs:     Clear to auscultation,respirations regular, even and                   unlabored    Heart:    Regular rhythm and normal rate, normal S1 and S2, no            murmur, no gallop, no rub, no click   Breast Exam:    Deferred   Abdomen:     Normal bowel sounds, no masses, no organomegaly, soft        nontender, nondistended, no guarding, no rebound                 tenderness   Genitalia:    Deferred   Extremities:   Moves all extremities well, no edema, no cyanosis, no              redness   Pulses:   Pulses palpable and equal bilaterally   Skin:   No bleeding, bruising or rash   Lymph nodes:   No palpable adenopathy   Neurologic:   Cranial nerves 2 - 12 grossly intact, sensation intact, DTR        present and equal bilaterally      Results Review:     Lab Results   Component Value Date    WBC 10.27 06/14/2022    WBC 11.65 (H) 06/13/2022    WBC 15.66 (H) 06/12/2022    HGB 9.9 (L) 06/14/2022    HGB 9.4 (L) 06/13/2022    HGB 9.2 (L) 06/13/2022    HCT 30.1 (L) 06/14/2022    HCT 28.2 (L) 06/13/2022    HCT 27.9 (L) 06/13/2022     06/14/2022     06/13/2022     06/12/2022     Results from  last 7 days   Lab Units 06/11/22 1914   ALK PHOS U/L 84   ALT (SGPT) U/L 83*   AST (SGOT) U/L 29     Results from last 7 days   Lab Units 06/11/22 1914   BILIRUBIN mg/dL 0.8   ALK PHOS U/L 84     No results found for: LIPASE  No results found for: INR      Radiology Review:  Imaging Results (Last 72 Hours)     Procedure Component Value Units Date/Time    CT Abdomen Pelvis Without Contrast [[id number]] Collected: 06/11/22 2102     Updated: 06/11/22 2211    Narrative:      EXAM DESCRIPTION:  CT ABDOMEN PELVIS WO CONTRAST  RadLex: CT ABDOMEN PELVIS WITHOUT IV CONTRAST     CLINICAL HISTORY:   72 years  Male;  abd pain, n/v    TECHNOLOGIST NOTES:    TECHNIQUE: Helical CT imaging was obtained of the abdomen and  pelvis with multiplanar reconstructions. This exam was performed  according to our departmental dose-optimization program, which  includes automated exposure control, adjustment of the mA and/or  kV according to patient size and/or use of iterative  reconstruction techniques.     COMPARISON: None currently available    FINDINGS:   Abdomen:  No evidence of acute disease in the visualized lung bases.    There is a PEG tube.  The liver, spleen, pancreas, adrenal glands  and kidneys show no acute abnormality. No evidence of acute  pancreatitis.    There are no calcified gallstones. There is no  gallbladder wall thickening. No pericholecystic fluid.  There is  no gross biliary duct dilatation.  There are no renal stones.  There is moderately severe bilateral hydronephrosis and  hydroureter.      No free air.    There is no significant free fluid.    There is  no significant aneurysmal enlargement of the abdominal aorta.    Pelvis:  There is no evidence of bowel obstruction.    No herniated bowel  loops.  . No focal inflammatory changes . No evidence of  appendicitis. There is colonic diverticulosis but no focal  diverticulitis.  Evaluation of the bowel is limited when there is  no oral contrast or when the bowel is  incompletely opacified with  enteric contrast.. There is no significant free fluid in the  pelvis. The bladder is distended. There is a Hester catheter in  place..          Impression:      1.  Moderately severe bilateral hydronephrosis and hydroureter.  The bladder is distended.  2.  No bowel obstruction, herniated bowel loops or focal  inflammatory changes.  3.  Colonic diverticulosis    Electronically signed by:  Rakesh May MD  6/11/2022 10:09 PM CDT  Workstation: [id number]          I reviewed the patient's new clinical results.    I reviewed the patient's new imaging results and agree with the interpretation.     ASSESSMENT/PLAN:   ASSESSMENT: 1.  Epigastric pain with nausea vomiting and hematemesis rule out peptic ulcer disease, gastritis and PEG site ulceration.  2.  Bloody stools and anemia need to rule out colonic pathology.  3.  UTI, improving.  PLAN: 1.  Continue PPI, antibiotics and current supportive care  2.  Follow H&H closely and transfuse as needed  3.  Proceed with EGD and colonoscopy in a.m. for further evaluation  The risks, benefits, and alternatives of this procedure have been discussed with the patient or the responsible party. The patient understands and agrees to proceed.         Karen Kaye MD  06/14/22  12:01 CDT

## 2022-06-15 ENCOUNTER — ANESTHESIA EVENT (OUTPATIENT)
Dept: GASTROENTEROLOGY | Facility: HOSPITAL | Age: 72
End: 2022-06-15

## 2022-06-15 ENCOUNTER — ANESTHESIA (OUTPATIENT)
Dept: GASTROENTEROLOGY | Facility: HOSPITAL | Age: 72
End: 2022-06-15

## 2022-06-15 LAB
ANION GAP SERPL CALCULATED.3IONS-SCNC: 9 MMOL/L (ref 5–15)
BASOPHILS # BLD AUTO: 0.05 10*3/MM3 (ref 0–0.2)
BASOPHILS NFR BLD AUTO: 0.4 % (ref 0–1.5)
BUN SERPL-MCNC: 18 MG/DL (ref 8–23)
BUN/CREAT SERPL: 33.3 (ref 7–25)
CALCIUM SPEC-SCNC: 8.4 MG/DL (ref 8.6–10.5)
CHLORIDE SERPL-SCNC: 100 MMOL/L (ref 98–107)
CO2 SERPL-SCNC: 22 MMOL/L (ref 22–29)
CREAT SERPL-MCNC: 0.54 MG/DL (ref 0.76–1.27)
DEPRECATED RDW RBC AUTO: 44 FL (ref 37–54)
EGFRCR SERPLBLD CKD-EPI 2021: 105.9 ML/MIN/1.73
EOSINOPHIL # BLD AUTO: 0.17 10*3/MM3 (ref 0–0.4)
EOSINOPHIL NFR BLD AUTO: 1.5 % (ref 0.3–6.2)
ERYTHROCYTE [DISTWIDTH] IN BLOOD BY AUTOMATED COUNT: 14.2 % (ref 12.3–15.4)
GLUCOSE SERPL-MCNC: 105 MG/DL (ref 65–99)
HCT VFR BLD AUTO: 30 % (ref 37.5–51)
HGB BLD-MCNC: 10 G/DL (ref 13–17.7)
HOLD SPECIMEN: NORMAL
IMM GRANULOCYTES # BLD AUTO: 0.27 10*3/MM3 (ref 0–0.05)
IMM GRANULOCYTES NFR BLD AUTO: 2.3 % (ref 0–0.5)
LYMPHOCYTES # BLD AUTO: 1.2 10*3/MM3 (ref 0.7–3.1)
LYMPHOCYTES NFR BLD AUTO: 10.4 % (ref 19.6–45.3)
MCH RBC QN AUTO: 28.2 PG (ref 26.6–33)
MCHC RBC AUTO-ENTMCNC: 33.3 G/DL (ref 31.5–35.7)
MCV RBC AUTO: 84.5 FL (ref 79–97)
MONOCYTES # BLD AUTO: 0.97 10*3/MM3 (ref 0.1–0.9)
MONOCYTES NFR BLD AUTO: 8.4 % (ref 5–12)
NEUTROPHILS NFR BLD AUTO: 77 % (ref 42.7–76)
NEUTROPHILS NFR BLD AUTO: 8.88 10*3/MM3 (ref 1.7–7)
NRBC BLD AUTO-RTO: 0 /100 WBC (ref 0–0.2)
PLATELET # BLD AUTO: 256 10*3/MM3 (ref 140–450)
PMV BLD AUTO: 9.4 FL (ref 6–12)
POTASSIUM SERPL-SCNC: 3.7 MMOL/L (ref 3.5–5.2)
RBC # BLD AUTO: 3.55 10*6/MM3 (ref 4.14–5.8)
SODIUM SERPL-SCNC: 131 MMOL/L (ref 136–145)
VANCOMYCIN TROUGH SERPL-MCNC: 12.2 MCG/ML (ref 5–20)
WBC NRBC COR # BLD: 11.54 10*3/MM3 (ref 3.4–10.8)

## 2022-06-15 PROCEDURE — 43239 EGD BIOPSY SINGLE/MULTIPLE: CPT | Performed by: INTERNAL MEDICINE

## 2022-06-15 PROCEDURE — 0DB98ZX EXCISION OF DUODENUM, VIA NATURAL OR ARTIFICIAL OPENING ENDOSCOPIC, DIAGNOSTIC: ICD-10-PCS | Performed by: INTERNAL MEDICINE

## 2022-06-15 PROCEDURE — 25010000002 VANCOMYCIN 10 G RECONSTITUTED SOLUTION: Performed by: INTERNAL MEDICINE

## 2022-06-15 PROCEDURE — 0DB68ZX EXCISION OF STOMACH, VIA NATURAL OR ARTIFICIAL OPENING ENDOSCOPIC, DIAGNOSTIC: ICD-10-PCS | Performed by: INTERNAL MEDICINE

## 2022-06-15 PROCEDURE — 0DB58ZX EXCISION OF ESOPHAGUS, VIA NATURAL OR ARTIFICIAL OPENING ENDOSCOPIC, DIAGNOSTIC: ICD-10-PCS | Performed by: INTERNAL MEDICINE

## 2022-06-15 PROCEDURE — 25010000002 PHENYLEPHRINE 10 MG/ML SOLUTION: Performed by: NURSE ANESTHETIST, CERTIFIED REGISTERED

## 2022-06-15 PROCEDURE — 25010000002 MORPHINE PER 10 MG: Performed by: INTERNAL MEDICINE

## 2022-06-15 PROCEDURE — 0DJD8ZZ INSPECTION OF LOWER INTESTINAL TRACT, VIA NATURAL OR ARTIFICIAL OPENING ENDOSCOPIC: ICD-10-PCS | Performed by: INTERNAL MEDICINE

## 2022-06-15 PROCEDURE — 25010000002 CEFTRIAXONE PER 250 MG: Performed by: INTERNAL MEDICINE

## 2022-06-15 PROCEDURE — 88313 SPECIAL STAINS GROUP 2: CPT

## 2022-06-15 PROCEDURE — 45378 DIAGNOSTIC COLONOSCOPY: CPT | Performed by: INTERNAL MEDICINE

## 2022-06-15 PROCEDURE — 85025 COMPLETE CBC W/AUTO DIFF WBC: CPT | Performed by: PHYSICIAN ASSISTANT

## 2022-06-15 PROCEDURE — 25010000002 PROPOFOL 10 MG/ML EMULSION: Performed by: NURSE ANESTHETIST, CERTIFIED REGISTERED

## 2022-06-15 PROCEDURE — 80048 BASIC METABOLIC PNL TOTAL CA: CPT | Performed by: PHYSICIAN ASSISTANT

## 2022-06-15 PROCEDURE — 88305 TISSUE EXAM BY PATHOLOGIST: CPT

## 2022-06-15 PROCEDURE — 80202 ASSAY OF VANCOMYCIN: CPT | Performed by: PHYSICIAN ASSISTANT

## 2022-06-15 RX ORDER — PROPOFOL 10 MG/ML
VIAL (ML) INTRAVENOUS AS NEEDED
Status: DISCONTINUED | OUTPATIENT
Start: 2022-06-15 | End: 2022-06-15 | Stop reason: SURG

## 2022-06-15 RX ORDER — PHENYLEPHRINE HCL IN 0.9% NACL 0.5 MG/5ML
SYRINGE (ML) INTRAVENOUS AS NEEDED
Status: DISCONTINUED | OUTPATIENT
Start: 2022-06-15 | End: 2022-06-15 | Stop reason: SURG

## 2022-06-15 RX ORDER — SODIUM CHLORIDE 9 MG/ML
INJECTION, SOLUTION INTRAVENOUS CONTINUOUS PRN
Status: DISCONTINUED | OUTPATIENT
Start: 2022-06-15 | End: 2022-06-15 | Stop reason: SURG

## 2022-06-15 RX ORDER — LIDOCAINE HYDROCHLORIDE 20 MG/ML
INJECTION, SOLUTION INTRAVENOUS AS NEEDED
Status: DISCONTINUED | OUTPATIENT
Start: 2022-06-15 | End: 2022-06-15 | Stop reason: SURG

## 2022-06-15 RX ORDER — DEXTROSE AND SODIUM CHLORIDE 5; .45 G/100ML; G/100ML
30 INJECTION, SOLUTION INTRAVENOUS CONTINUOUS PRN
Status: DISCONTINUED | OUTPATIENT
Start: 2022-06-15 | End: 2022-06-16 | Stop reason: HOSPADM

## 2022-06-15 RX ADMIN — LIDOCAINE HYDROCHLORIDE 50 MG: 20 INJECTION, SOLUTION INTRAVENOUS at 11:04

## 2022-06-15 RX ADMIN — Medication 100 MCG: at 11:18

## 2022-06-15 RX ADMIN — VANCOMYCIN HYDROCHLORIDE 1000 MG: 10 INJECTION, POWDER, LYOPHILIZED, FOR SOLUTION INTRAVENOUS at 15:33

## 2022-06-15 RX ADMIN — Medication: at 03:34

## 2022-06-15 RX ADMIN — PROPOFOL 60 MG: 10 INJECTION, EMULSION INTRAVENOUS at 11:04

## 2022-06-15 RX ADMIN — CEFTRIAXONE SODIUM 2 G: 2 INJECTION, POWDER, FOR SOLUTION INTRAMUSCULAR; INTRAVENOUS at 04:15

## 2022-06-15 RX ADMIN — PROPOFOL 20 MG: 10 INJECTION, EMULSION INTRAVENOUS at 11:05

## 2022-06-15 RX ADMIN — MORPHINE SULFATE 2 MG: 2 INJECTION, SOLUTION INTRAMUSCULAR; INTRAVENOUS at 08:19

## 2022-06-15 RX ADMIN — VANCOMYCIN HYDROCHLORIDE 1000 MG: 10 INJECTION, POWDER, LYOPHILIZED, FOR SOLUTION INTRAVENOUS at 03:38

## 2022-06-15 RX ADMIN — PANTOPRAZOLE SODIUM 40 MG: 40 INJECTION, POWDER, FOR SOLUTION INTRAVENOUS at 21:45

## 2022-06-15 RX ADMIN — Medication 10 ML: at 08:25

## 2022-06-15 RX ADMIN — PROPOFOL 10 MG: 10 INJECTION, EMULSION INTRAVENOUS at 11:10

## 2022-06-15 RX ADMIN — PROPOFOL 10 MG: 10 INJECTION, EMULSION INTRAVENOUS at 11:13

## 2022-06-15 RX ADMIN — NICOTINE 1 PATCH: 21 PATCH, EXTENDED RELEASE TRANSDERMAL at 08:22

## 2022-06-15 RX ADMIN — PROPOFOL 20 MG: 10 INJECTION, EMULSION INTRAVENOUS at 11:06

## 2022-06-15 RX ADMIN — PANTOPRAZOLE SODIUM 40 MG: 40 INJECTION, POWDER, FOR SOLUTION INTRAVENOUS at 08:20

## 2022-06-15 RX ADMIN — Medication 100 MCG: at 11:23

## 2022-06-15 RX ADMIN — SODIUM CHLORIDE: 9 INJECTION, SOLUTION INTRAVENOUS at 11:01

## 2022-06-15 NOTE — DISCHARGE PLACEMENT REQUEST
"Marcela Ivan (72 y.o. Male)             Date of Birth   1950    Social Security Number       [place] [address]    Home Phone   [phone]    MRN   [id number]       Advent   None    Marital Status                               Admission Date   6/11/22    Admission Type   Emergency    Admitting Provider   Mika Felix MD    Attending Provider   Mika Felix MD    Department, Room/Bed   [address], 309/1       Discharge Date       Discharge Disposition       Discharge Destination                               Attending Provider: Mika Felix MD    Allergies: No Known Allergies    Isolation: None   Infection: None   Code Status: CPR   Advance Care Planning Activity    Ht: 182.9 cm (72.01\")   Wt: 75 kg (165 lb 6.4 oz)    Admission Cmt: None   Principal Problem: None                Active Insurance as of 6/11/2022     Primary Coverage     Payor Plan Insurance Group Employer/Plan Group    HUMANA MEDICARE REPLACEMENT HUMANA MEDICARE REPLACEMENT [id number]     Payor Plan Address Payor Plan Phone Number Payor Plan Fax Number Effective Dates    [address] [address] [phone]  1/1/2019 - None Entered    [facility] [address]       Subscriber Name Subscriber Birth Date Member ID       MARCELA IVAN 1950 [id number]                 Emergency Contacts      (Rel.) Home Phone Work Phone Mobile Phone    SUSAN IVAN (Daughter) -- -- [phone]    MAKENZIE IVAN (Son) -- -- [phone]            Insurance Information                HUMANA MEDICARE REPLACEMENT/HUMANA MEDICARE REPLACEMENT Phone: [phone]    Subscriber: Mon[employer]ela Subscriber#: [id number]    Group#: [id number] Precert#: --          "

## 2022-06-15 NOTE — PROGRESS NOTES
"Pharmacokinetics by Pharmacy - Vancomycin    Fer Ivan is a 72 y.o. male receiving vancomycin 1000mg IV Q12H day 4 for sepsis  Patient is also receiving ceftriaxone     Objective:  [Ht: 182.9 cm (72.01\"); Wt: 75 kg (165 lb 6.4 oz)]     WBC   Date Value Ref Range Status   06/14/2022 10.27 3.40 - 10.80 10*3/mm3 Final   06/13/2022 11.65 (H) 3.40 - 10.80 10*3/mm3 Final      Lactate   Date Value Ref Range Status   06/12/2022 1.2 0.5 - 2.0 mmol/L Final      Temp Readings from Last 1 Encounters:   06/15/22 97.5 °F (36.4 °C)     Estimated Creatinine Clearance: 122.1 mL/min (A) (by C-G formula based on SCr of 0.58 mg/dL (L)).   Creatinine   Date Value Ref Range Status   06/14/2022 0.58 (L) 0.76 - 1.27 mg/dL Final   06/13/2022 0.78 0.76 - 1.27 mg/dL Final       Vancomycin Peak   Date Value Ref Range Status   06/14/2022 40.20 (C) 20.00 - 40.00 mcg/mL Final     Vancomycin Trough   Date Value Ref Range Status   06/15/2022 12.20 5.00 - 20.00 mcg/mL Final       Culture Results:  Microbiology Results (last 10 days)     Procedure Component Value - Date/Time    Blood Culture - Blood, Hand, Left [[id number]]  (Normal) Collected: 06/12/22 0333    Lab Status: Preliminary result Specimen: Blood from Hand, Left Updated: 06/15/22 0347     Blood Culture No growth at 3 days    Blood Culture - Blood, Arm, Left [[id number]]  (Normal) Collected: 06/12/22 0333    Lab Status: Preliminary result Specimen: Blood from Arm, Left Updated: 06/15/22 0347     Blood Culture No growth at 3 days    CRE Screen by PCR - Swab, Large Intestine, Rectum [[id number]] Collected: 06/12/22 0050    Lab Status: Final result Specimen: Swab from Large Intestine, Rectum Updated: 06/12/22 0304     CRE SCREEN Not Detected     Comment: Test performed by real-time polymerase chain reaction (qPCR).        OXA 48 Strain Not Detected     IMP STRAIN Not Detected     VIM STRAIN Not Detected     NDM Strain Not Detected     KPC Strain Not Detected    Urine Culture - Urine, Urine, " Catheter [[id number]] Collected: 06/11/22 2319    Lab Status: Final result Specimen: Urine, Catheter Updated: 06/13/22 1127     Urine Culture >100,000 CFU/mL Mixed Marlena Isolated    Narrative:      Specimen contains mixed organisms of questionable pathogenicity suggestive of contamination. If symptoms persist, suggest recollection.  Colonization of the urinary tract without infection is common. Treatment is discouraged unless the patient is symptomatic, pregnant, or undergoing an invasive urologic procedure.    COVID-19 and FLU A/B PCR - Swab, Nasopharynx [[id number]]  (Normal) Collected: 06/11/22 1905    Lab Status: Final result Specimen: Swab from Nasopharynx Updated: 06/11/22 1931     COVID19 Not Detected     Influenza A PCR Not Detected     Influenza B PCR Not Detected    Narrative:      Fact sheet for providers: https://www.fda.gov/media/892074/download    Fact sheet for patients: https://www.fda.gov/media/814957/download    Test performed by PCR.        No results found for: RESPCX    Assessment:    No WBC today. Continue to monitor trends  Scr 0.54, WNL and stable   Patient is afebrile    6/12 blood culture: no growth at 3 days   6/12 CRE sceen: negative  6/11 urine culture, catheter: mixed marlena     6/11 CT Abdomen Pelvis: moderately severe bilateral hydronephrosis and hydroureter. Bladder is distended. Colonic diverticulitis. No bowel obstruction, herniated bowel loops or focal inflammatory changes.    Vancomycin peak resulted at 40.2 (goal 30-40) at 1654 on 6/14 and the vancomycin trough resulted at 12.2 (goal 10-20) on 6/15 at 0259. Peak was drawn early approximately 20 minutes prior to the end of infusion and therefore resulted in a falsely elevated level. Vancomycin trough level was drawn appropriately. This gives an AUC within goal of 400-600. Will continue current regimen.    Plan:  1. Continue vancomycin 1000mg IV Q12H  2. Vancomycin peak 6/16 at 1700 and vancomycin trough 6/17 at 0230  3. Pharmacy  will monitor renal function and adjust dose accordingly.      Irving Dias [facility]   06/15/22 08:10 CDT

## 2022-06-15 NOTE — CONSULTS
Nutrition Services    Patient Name:  Fer Ivan  YOB: 1950  MRN: [id number]  Admit Date:  6/11/2022    RD f/u. Pt is s/p EGD/colonoscopy today and has been NPO/no EN. Pt is back to his room. Tubefeeding has not been resumed at time of RD visit. Per family member in room, pt was taking pureed diet during the day and EN at night at the nursing home. Will monitor course and make recs accordingly.     Electronically signed by:  Sandra Pyle RD  06/15/22 13:45 CDT

## 2022-06-15 NOTE — PLAN OF CARE
Goal Outcome Evaluation:  Plan of Care Reviewed With: family           Outcome Evaluation: Pt has been NPO/no EN in prep for EGD/colonoscopy today. Test complete and pt is back to room. EN has not been restarted at time of RD visit. Spoke w/family member who reports pt ate pureed diet during the day and EN ran at night at the nursing home. Will monitor POC.

## 2022-06-15 NOTE — PROGRESS NOTES
[facility] Medicine   INPATIENT PROGRESS NOTE      Patient Name: Fer Ivan  Date of Admission: 6/11/2022  Today's Date: 06/15/22  Length of Stay: 4  Primary Care Physician: Estrada Fuentes MD    Subjective   Chief Complaint: Melena, abdominal pain  HPI   Patient who presented to the ED with chief complaint of nausea, vomiting, abdominal pain, coffee-ground emesis, black stool from SNF.  Has PEG tube intact, poor historian secondary to CVA.  Was found to be septic from UTI with hydronephrosis, creatinine 1.7, WBC 17, lactic acid 2.7, hemoglobin 13.5.  He initially had no output into his catheter so it was replaced and is now on CBI.  He was given Vanco and cefepime for UTI with urology consult.  His blood cultures have shown no growth, his urine consult was contaminated.  He has since finished his antibiotics.    Today he had an EGD with GI.  Awaiting results from this EGD.  In the room the patient is doing well, tolerated the procedure well.  No nausea, vomiting.  He is able to communicate some.  He also is on soft puréed diet in addition to the feedings through PEG.    Review of Systems   Review of Systems   Constitutional: Negative.    HENT: Negative.    Eyes: Negative.    Respiratory: Negative.    Cardiovascular: Negative.    Gastrointestinal: Nausea, vomiting, abdominal pain, coffee-ground emesis, melena  Endocrine: Negative.    Genitourinary: Negative.    Musculoskeletal: Negative.    Skin: Negative.    Neurological: Negative.    Psychiatric/Behavioral: Negative.      All pertinent negatives and positives are as above. All other systems have been reviewed and are negative unless otherwise stated.     Objective    Temp:  [97.5 °F (36.4 °C)-99.4 °F (37.4 °C)] 99.4 °F (37.4 °C)  Heart Rate:  [] 101  Resp:  [18-20] 18  BP: ()/(50-73) 124/66  Physical Exam  Physical Exam  Vitals and nursing note reviewed.   Constitutional:       General: She is not  in acute distress.  Awake, alert.  Answers some questions.     Appearance: Normal appearance.   HENT:      Head: Normocephalic and atraumatic.      Right Ear: External ear normal.      Left Ear: External ear normal.      Nose: Nose normal.      Mouth/Throat:      Mouth: Mucous membranes are moist.   Eyes:      Conjunctiva/sclera: Conjunctivae normal.      Pupils: Pupils are equal, round, and reactive to light.   Cardiovascular:      Rate and Rhythm: Normal rate and regular rhythm.   Pulmonary:      Effort: Pulmonary effort is normal.      Breath sounds: Normal breath sounds.   Abdominal:      General: Abdomen is flat.      Palpations: Abdomen is soft.      Tenderness: There is no abdominal tenderness.   Musculoskeletal:         General: No signs of injury. Normal range of motion.      Cervical back: No tenderness.   Skin:     General: Skin is warm and dry.   Neurological:      General: No focal deficit present.      Mental Status: She is alert and oriented to person, place, and time.   Psychiatric:         Mood and Affect: Mood normal.         Behavior: Behavior normal.           Results Review:  I have reviewed the labs, radiology results, and diagnostic studies.    Laboratory Data:   Results from last 7 days   Lab Units 06/15/22  1230 06/14/22  0546 06/13/22 2010 06/13/22  0747 06/13/22  0626   WBC 10*3/mm3 11.54* 10.27  --   --  11.65*   HEMOGLOBIN g/dL 10.0* 9.9* 9.4*   < > 9.2*   HEMATOCRIT % 30.0* 30.1* 28.2*   < > 27.6*   PLATELETS 10*3/mm3 256 237  --   --  229    < > = values in this interval not displayed.        Results from last 7 days   Lab Units 06/15/22  0846 06/14/22  0546 06/13/22  0626 06/12/22  0333 06/11/22  1914   SODIUM mmol/L 131* 136 137   < > 134*   POTASSIUM mmol/L 3.7 3.9 4.0   < > 5.3*   CHLORIDE mmol/L 100 104 104   < > 96*   CO2 mmol/L 22.0 23.0 25.0   < > 20.0*   BUN mg/dL 18 28* 44*   < > 56*   CREATININE mg/dL 0.54* 0.58* 0.78   < > 1.74*   CALCIUM mg/dL 8.4* 8.1* 8.3*   < > 9.9    BILIRUBIN mg/dL  --   --   --   --  0.8   ALK PHOS U/L  --   --   --   --  84   ALT (SGPT) U/L  --   --   --   --  83*   AST (SGOT) U/L  --   --   --   --  29   GLUCOSE mg/dL 105* 136* 128*   < > 187*    < > = values in this interval not displayed.       Culture Data:   No results found for: BLOODCX  No results found for: URINECX  No results found for: RESPCX  No results found for: WOUNDCX  No results found for: STOOLCX  No components found for: BODYFLD    Radiology Data:   Imaging Results (Last 24 Hours)     ** No results found for the last 24 hours. **          I have reviewed the patient's current medications.     Assessment/Plan     Active Hospital Problems    Diagnosis    • Sepsis (HCC)    • Sepsis, due to unspecified organism, unspecified whether acute organ dysfunction present (HCC)    • Hematemesis with nausea      Added automatically from request for surgery [id number]     • Epigastric pain      Added automatically from request for surgery [id number]     • Absolute anemia      Added automatically from request for surgery [id number]         Sepsis:   Secondary to urinary tract infection.  Supportive care.  Aggressive fluid resuscitation.    Urinary tract infection:   Likely secondary to chronic indwelling Hester.  Hester was changed in the emergency department.  Patient with bilateral hydronephrosis and hydroureter prior to Hester being changed.  CBI going.  Will titrate CBI down to discontinue.  Blood culture shows no growth x2 days.  Urine culture was contaminated.  Finished course of broad-spectrum antibiotics.    GI bleed:    Continue IV Protonix.  Hemoglobin stable.    EGD done today, awaiting results of EGD but family tells me that he was found to have a Charito-Wray tear, gastritis, esophagitis, nonbleeding internal hemorrhoids, diverticulosis but no acute findings.    Hyponatremia:  New problem today, will monitor tomorrow labs    Nutrition: Patient with significant dysphagia requiring tube feedings.   Continue tube feedings.    Acute kidney injury:   Resolved with hydration.    DVT prophylaxis:   SCDs.      Discharge Planning: I expect the patient to be discharged in 1 to 2 days to SNF  Electronically signed by Erin Santiago PA-C, 06/15/22, 15:33 CDT.

## 2022-06-15 NOTE — NURSING NOTE
On restarting the patient's tube feeding, this nurse observed the tube was out about 4 inches. With gentle pressure this nurse pushed back in. It then came right back out. Dr. Kaye paged and made aware. She informed that as long as the balloon was not coming out and it was able to be flushed and used with no issue, there was not a concern. Tube flushed and feeding resumed.

## 2022-06-15 NOTE — ANESTHESIA POSTPROCEDURE EVALUATION
Patient: Fer Ivan    Procedure Summary     Date: 06/15/22 Room / Location: [facility] ENDOSCOPY 2 / [facility] ENDOSCOPY    Anesthesia Start: 1101 Anesthesia Stop: 1127    Procedures:       ESOPHAGOGASTRODUODENOSCOPY (N/A )      COLONOSCOPY (N/A ) Diagnosis:       Hematemesis with nausea      Epigastric pain      Other iron deficiency anemia      (Hematemesis with nausea [K92.0])      (Epigastric pain [R10.13])      (Other iron deficiency anemia [D50.8])    Surgeons: Karen Kaye MD Provider: Oma Silveira CRNA    Anesthesia Type: MAC ASA Status: 4          Anesthesia Type: MAC    Vitals  No vitals data found for the desired time range.          Post Anesthesia Care and Evaluation    Patient location during evaluation: bedside  Patient participation: complete - patient participated  Level of consciousness: awake and responsive to verbal stimuli  Pain score: 0  Pain management: adequate    Airway patency: patent  Anesthetic complications: No anesthetic complications  PONV Status: none  Cardiovascular status: acceptable  Respiratory status: acceptable  Hydration status: acceptable

## 2022-06-16 VITALS
DIASTOLIC BLOOD PRESSURE: 67 MMHG | RESPIRATION RATE: 18 BRPM | HEART RATE: 98 BPM | HEIGHT: 72 IN | BODY MASS INDEX: 22.2 KG/M2 | TEMPERATURE: 98.2 F | WEIGHT: 163.9 LBS | SYSTOLIC BLOOD PRESSURE: 138 MMHG | OXYGEN SATURATION: 99 %

## 2022-06-16 PROBLEM — N39.0 URINARY TRACT INFECTION ASSOCIATED WITH INDWELLING URETHRAL CATHETER: Status: ACTIVE | Noted: 2022-06-16

## 2022-06-16 PROBLEM — T83.511A URINARY TRACT INFECTION ASSOCIATED WITH INDWELLING URETHRAL CATHETER: Status: ACTIVE | Noted: 2022-06-16

## 2022-06-16 LAB
ANION GAP SERPL CALCULATED.3IONS-SCNC: 9 MMOL/L (ref 5–15)
BASOPHILS # BLD AUTO: 0.05 10*3/MM3 (ref 0–0.2)
BASOPHILS NFR BLD AUTO: 0.4 % (ref 0–1.5)
BUN SERPL-MCNC: 19 MG/DL (ref 8–23)
BUN/CREAT SERPL: 36.5 (ref 7–25)
CALCIUM SPEC-SCNC: 8.3 MG/DL (ref 8.6–10.5)
CHLORIDE SERPL-SCNC: 102 MMOL/L (ref 98–107)
CO2 SERPL-SCNC: 23 MMOL/L (ref 22–29)
CREAT SERPL-MCNC: 0.52 MG/DL (ref 0.76–1.27)
DEPRECATED RDW RBC AUTO: 43.5 FL (ref 37–54)
EGFRCR SERPLBLD CKD-EPI 2021: 107.1 ML/MIN/1.73
EOSINOPHIL # BLD AUTO: 0.16 10*3/MM3 (ref 0–0.4)
EOSINOPHIL NFR BLD AUTO: 1.2 % (ref 0.3–6.2)
ERYTHROCYTE [DISTWIDTH] IN BLOOD BY AUTOMATED COUNT: 14.4 % (ref 12.3–15.4)
FLUAV RNA RESP QL NAA+PROBE: NOT DETECTED
FLUBV RNA RESP QL NAA+PROBE: NOT DETECTED
GLUCOSE SERPL-MCNC: 146 MG/DL (ref 65–99)
HCT VFR BLD AUTO: 32.5 % (ref 37.5–51)
HGB BLD-MCNC: 10.5 G/DL (ref 13–17.7)
IMM GRANULOCYTES # BLD AUTO: 0.37 10*3/MM3 (ref 0–0.05)
IMM GRANULOCYTES NFR BLD AUTO: 2.8 % (ref 0–0.5)
LYMPHOCYTES # BLD AUTO: 1.15 10*3/MM3 (ref 0.7–3.1)
LYMPHOCYTES NFR BLD AUTO: 8.6 % (ref 19.6–45.3)
MCH RBC QN AUTO: 26.7 PG (ref 26.6–33)
MCHC RBC AUTO-ENTMCNC: 32.3 G/DL (ref 31.5–35.7)
MCV RBC AUTO: 82.7 FL (ref 79–97)
MONOCYTES # BLD AUTO: 1.13 10*3/MM3 (ref 0.1–0.9)
MONOCYTES NFR BLD AUTO: 8.5 % (ref 5–12)
NEUTROPHILS NFR BLD AUTO: 10.45 10*3/MM3 (ref 1.7–7)
NEUTROPHILS NFR BLD AUTO: 78.5 % (ref 42.7–76)
NRBC BLD AUTO-RTO: 0 /100 WBC (ref 0–0.2)
PLATELET # BLD AUTO: 273 10*3/MM3 (ref 140–450)
PMV BLD AUTO: 9.5 FL (ref 6–12)
POTASSIUM SERPL-SCNC: 4 MMOL/L (ref 3.5–5.2)
RBC # BLD AUTO: 3.93 10*6/MM3 (ref 4.14–5.8)
SARS-COV-2 RNA RESP QL NAA+PROBE: NOT DETECTED
SODIUM SERPL-SCNC: 134 MMOL/L (ref 136–145)
WBC NRBC COR # BLD: 13.31 10*3/MM3 (ref 3.4–10.8)

## 2022-06-16 PROCEDURE — 85025 COMPLETE CBC W/AUTO DIFF WBC: CPT | Performed by: INTERNAL MEDICINE

## 2022-06-16 PROCEDURE — 80048 BASIC METABOLIC PNL TOTAL CA: CPT | Performed by: INTERNAL MEDICINE

## 2022-06-16 PROCEDURE — 25010000002 VANCOMYCIN 10 G RECONSTITUTED SOLUTION: Performed by: INTERNAL MEDICINE

## 2022-06-16 PROCEDURE — 25010000002 CEFTRIAXONE PER 250 MG: Performed by: INTERNAL MEDICINE

## 2022-06-16 PROCEDURE — 99232 SBSQ HOSP IP/OBS MODERATE 35: CPT | Performed by: INTERNAL MEDICINE

## 2022-06-16 PROCEDURE — 87636 SARSCOV2 & INF A&B AMP PRB: CPT | Performed by: INTERNAL MEDICINE

## 2022-06-16 RX ADMIN — PANTOPRAZOLE SODIUM 40 MG: 40 INJECTION, POWDER, FOR SOLUTION INTRAVENOUS at 09:12

## 2022-06-16 RX ADMIN — VANCOMYCIN HYDROCHLORIDE 1000 MG: 10 INJECTION, POWDER, LYOPHILIZED, FOR SOLUTION INTRAVENOUS at 04:04

## 2022-06-16 RX ADMIN — NICOTINE 1 PATCH: 21 PATCH, EXTENDED RELEASE TRANSDERMAL at 09:12

## 2022-06-16 RX ADMIN — Medication 10 ML: at 09:12

## 2022-06-16 RX ADMIN — CEFTRIAXONE SODIUM 2 G: 2 INJECTION, POWDER, FOR SOLUTION INTRAMUSCULAR; INTRAVENOUS at 03:00

## 2022-06-16 NOTE — PROGRESS NOTES
"Physicians Statement of Medical Necessity for  Ambulance Transportation    GENERAL INFORMATION     Name: Fer Ivan  YOB: 1950  Medicare #: [id number]  Transport Date: 6/16/2022 (Valid for round trips this date, or for scheduled repetitive trips for 60 days from the date signed below.)  Origin: [facility] room 309  Destination: [facility] room 409  Is the Patient's stay covered under Medicare Part A (PPS/DRG?)Yes  Closest appropriate facility? Yes  If this a hosp-hosp transfer? No  Is this a hospice patient? No    MEDICAL NECESSITY QUESTIONAIRE    Ambulance Transportation is medically necessary only if other means of transportation are contraindicated or would be potentially harmful to the patient.  To meet this requirement, the patient must be either \"bed confined\" or suffer from a condition such that transport by means other than an ambulance is contraindicated by the patient's condition.  The following questions must be answered by the healthcare professional signing below for this form to be valid:     1) Describe the MEDICAL CONDITION (physical and/or mental) of this patient AT THE TIME OF AMBULANCE TRANSPORT that requires the patient to be transported in an ambulance, and why transport by other means is contraindicated by the patient's condition: CVA generalized weakness, bed confined, chronic clifton, tube feeding        2) Is this patient \"bed confined\" as defined below?Yes   To be \"bed confined\" the patient must satisfy all three of the following criteria:  (1) unable to get up from bed without assistance; AND (2) unable to ambulate;  AND (3) unable to sit in a chair or wheelchair.  3) Can this patient safely be transported by car or wheelchair van (I.e., may safely sit during transport, without an attendant or monitoring?)No   4. In addition to completing questions 1-3 above, please check any of the following conditions that apply*:          *Note: supporting documentation for any boxes " checked must be maintained in the patient's medical records Unable to tolerate seated position for time needed to transport      SIGNATURE OF PHYSICIAN OR OTHER AUTHORIZED HEALTHCARE PROFESSIONAL    I certify that the above information is true and correct based on my evaluation of this patient, and represent that the patient requires transport by ambulance and that other forms of transport are contraindicated.  I understand that this information will be used by the Centers for Medicare and Medicaid Services (CMS) to support the determiniation of medical necessity for ambulance services, and I represent that I have personal knowledge of the patient's condition at the time of transport.       If this box is checked, I also certify that the patient is physically or mentally incapable of signing the ambulance service's claim form and that the institution with which I am affiliated has furnished care, services or assistance to the patient.  My signature below is made on behalf of the patient pursuant to 42 .36(b)(4). In accordance with 42 .37, the specific reason(s) that the patient is physically or mentally incapable of signing the claim for is as follows:     Signature of Physician or Healthcare Professional   Mallory Colunga RN  Date/Time:   6/16/2022 1633     (For Scheduled repetitive transport, this form is not valid for transports performed more than 60 days after this date).                                                                                                                                            --------------------------------------------------------------------------------------------  Printed Name and Credentials of Physician or Authorized Healthcare Professional     *Form must be signed by patient's attending physician for scheduled, repetitive transports,.  For non-repetitive ambulance transports, if unable to obtain the signature of the attending physician, any of the following may  sign (please select below):     Physician  Clinical Nurse Specialist x Registered Nurse     Physician Assistant  Discharge Planner  Licensed Practical Nurse     Nurse Practitioner x

## 2022-06-16 NOTE — PROGRESS NOTES
"Pharmacokinetics by Pharmacy - Vancomycin    Fer Ivan is a 72 y.o. male receiving vancomycin 1000mg IV Q12H day 5 for sepsis  Patient is also receiving ceftriaxone    Objective:  [Ht: 182.9 cm (72.01\"); Wt: 74.3 kg (163 lb 14.4 oz)]     WBC   Date Value Ref Range Status   06/16/2022 13.31 (H) 3.40 - 10.80 10*3/mm3 Final   06/15/2022 11.54 (H) 3.40 - 10.80 10*3/mm3 Final   06/14/2022 10.27 3.40 - 10.80 10*3/mm3 Final    No results found for: CRP, LACTATE   Temp Readings from Last 1 Encounters:   06/16/22 97.2 °F (36.2 °C) (Oral)     Estimated Creatinine Clearance: 134.9 mL/min (A) (by C-G formula based on SCr of 0.52 mg/dL (L)).   Creatinine   Date Value Ref Range Status   06/16/2022 0.52 (L) 0.76 - 1.27 mg/dL Final   06/15/2022 0.54 (L) 0.76 - 1.27 mg/dL Final   06/14/2022 0.58 (L) 0.76 - 1.27 mg/dL Final       Vancomycin Peak   Date Value Ref Range Status   06/14/2022 40.20 (C) 20.00 - 40.00 mcg/mL Final     Vancomycin Trough   Date Value Ref Range Status   06/15/2022 12.20 5.00 - 20.00 mcg/mL Final       Culture Results:  Microbiology Results (last 10 days)     Procedure Component Value - Date/Time    Blood Culture - Blood, Hand, Left [[id number]]  (Normal) Collected: 06/12/22 0333    Lab Status: Preliminary result Specimen: Blood from Hand, Left Updated: 06/16/22 0347     Blood Culture No growth at 4 days    Blood Culture - Blood, Arm, Left [[id number]]  (Normal) Collected: 06/12/22 0333    Lab Status: Preliminary result Specimen: Blood from Arm, Left Updated: 06/16/22 0347     Blood Culture No growth at 4 days    CRE Screen by PCR - Swab, Large Intestine, Rectum [[id number]] Collected: 06/12/22 0050    Lab Status: Final result Specimen: Swab from Large Intestine, Rectum Updated: 06/12/22 0304     CRE SCREEN Not Detected     Comment: Test performed by real-time polymerase chain reaction (qPCR).        OXA 48 Strain Not Detected     IMP STRAIN Not Detected     VIM STRAIN Not Detected     NDM Strain Not Detected    "  KPC Strain Not Detected    Urine Culture - Urine, Urine, Catheter [[id number]] Collected: 06/11/22 2319    Lab Status: Final result Specimen: Urine, Catheter Updated: 06/13/22 1127     Urine Culture >100,000 CFU/mL Mixed Marlena Isolated    Narrative:      Specimen contains mixed organisms of questionable pathogenicity suggestive of contamination. If symptoms persist, suggest recollection.  Colonization of the urinary tract without infection is common. Treatment is discouraged unless the patient is symptomatic, pregnant, or undergoing an invasive urologic procedure.    COVID-19 and FLU A/B PCR - Swab, Nasopharynx [[id number]]  (Normal) Collected: 06/11/22 1905    Lab Status: Final result Specimen: Swab from Nasopharynx Updated: 06/11/22 1931     COVID19 Not Detected     Influenza A PCR Not Detected     Influenza B PCR Not Detected    Narrative:      Fact sheet for providers: https://www.fda.gov/media/344812/download    Fact sheet for patients: https://www.fda.gov/media/719527/download    Test performed by PCR.        No results found for: RESPCX    Assessment:    WBC 13.31, elevated and has trended up slightly  Scr 0.52, low but stable   Patient is afebrile     6/12 blood culture x2: no growth at 4 days   6/11 urine culture: mixed marlena     6/11 CT Abdomen Pelvis: moderately severe bilateral hydronephrosis and hydroureter. Bladder is distended. Colonic diverticulitis. No bowel obstruction, herniated bowel loops or focal inflammatory changes    Vancomycin peak resulted at 40.2 (goal 30-40) at 1654 on 6/14 and the vancomycin trough resulted at 12.2 (goal 10-20) on 6/15 at 0259. Peak was drawn early approximately 20 minutes prior to the end of infusion and therefore resulted in a falsely elevated level. Vancomycin trough level was drawn appropriately. This gives an AUC within goal of 400-600. Will continue current regimen.     Plan:  1. Continue vancomycin 1000mg IV Q12H  2. Vancomycin peak 6/16 at 1700 and vancomycin  trough 6/17 at 0230  3. Pharmacy will monitor renal function and adjust dose accordingly.      Irving Dias RPH   06/16/22 08:13 CDT

## 2022-06-16 NOTE — PROGRESS NOTES
SUBJECTIVE:   6/16/2022  Chief Complaint:     Subjective      Patient feels better today.  No further rectal bleeding.  Tolerating tube feeds well.  Hemoglobin is 10.5.  EGD and colonoscopy yesterday was unremarkable for active bleeding.  Path is pending.    History:  Past Medical History:   Diagnosis Date   • Stroke (HCC)      Past Surgical History:   Procedure Laterality Date   • PEG TUBE INSERTION Left      History reviewed. No pertinent family history.  Social History     Tobacco Use   • Smoking status: Never Smoker   • Smokeless tobacco: Never Used   Vaping Use   • Vaping Use: Never used   Substance Use Topics   • Alcohol use: Never   • Drug use: Never     Medications Prior to Admission   Medication Sig Dispense Refill Last Dose   • docusate sodium (COLACE) 100 MG capsule Take 100 mg by mouth 2 (Two) Times a Day.   6/11/2022 at Unknown time   • FLUoxetine (PROzac) 20 MG capsule Take 20 mg by mouth Daily.   6/11/2022 at Unknown time   • melatonin 5 MG tablet tablet Take 5 mg by mouth Every Night.   6/11/2022 at Unknown time   • metoclopramide (REGLAN) 5 MG tablet Take 5 mg by mouth 4 (Four) Times a Day.   6/11/2022 at Unknown time   • nicotine (NICODERM CQ) 14 MG/24HR patch Place 1 patch on the skin as directed by provider Daily. 7mg to start on 6/21 6/11/2022 at Unknown time   • omeprazole (priLOSEC) 20 MG capsule Take 20 mg by mouth Daily.   6/11/2022 at Unknown time   • polyethylene glycol (MIRALAX) 17 g packet Take 17 g by mouth Daily.   6/12/2022 at Unknown time   • rosuvastatin (CRESTOR) 20 MG tablet Take 40 mg by mouth Every Night.   6/11/2022 at Unknown time   • lactulose (CHRONULAC) 10 GM/15ML solution Take 20 g by mouth As Needed.   Unknown at Unknown time   • magnesium hydroxide (MILK OF MAGNESIA) 400 MG/5ML suspension Take  by mouth Daily As Needed for Constipation.   Unknown at Unknown time     Allergies:  Patient has no known allergies.     CURRENT MEDICATIONS/OBJECTIVE/VS/PE:     Current  Medications:     Current Facility-Administered Medications   Medication Dose Route Frequency Provider Last Rate Last Admin   • acetaminophen (TYLENOL) tablet 650 mg  650 mg Oral Q4H PRN Karen Kaye MD        Or   • acetaminophen (TYLENOL) 160 MG/5ML solution 650 mg  650 mg Oral Q4H PRN Karen Kaye MD        Or   • acetaminophen (TYLENOL) suppository 650 mg  650 mg Rectal Q4H PRN Karen Kaye MD       • calcium carbonate (TUMS) chewable tablet 500 mg (200 mg elemental)  1 tablet Oral BID PRN Karen Kaye MD       • dextrose 5 % and sodium chloride 0.45 % infusion  30 mL/hr Intravenous Continuous PRN Karen Kaye MD       • melatonin tablet 5.25 mg  5.25 mg Oral Nightly PRN Karen Kaye MD       • morphine injection 2 mg  2 mg Intravenous Q4H PRN Karen Kaye MD   2 mg at 06/15/22 0819    And   • naloxone (NARCAN) injection 0.4 mg  0.4 mg Intravenous Q5 Min PRN Karen Kaye MD       • nicotine (NICODERM CQ) 21 MG/24HR patch 1 patch  1 patch Transdermal Q24H Karen Kaye MD   1 patch at 06/16/22 0912   • ondansetron (ZOFRAN) injection 4 mg  4 mg Intravenous Q6H PRN Karen Kaye MD       • pantoprazole (PROTONIX) injection 40 mg  40 mg Intravenous Q12H Karen Kaye MD   40 mg at 06/16/22 0912   • sodium chloride 0.9 % flush 10 mL  10 mL Intravenous Q12H Karen Kaye MD   10 mL at 06/16/22 0912   • sodium chloride 0.9 % flush 10 mL  10 mL Intravenous PRN Karen Kaye MD       • traMADol (ULTRAM) tablet 50 mg  50 mg Oral Q6H PRN Karen Kaye MD           Objective     Review of Systems:   Review of Systems   Constitutional: Negative for chills, fatigue, fever and unexpected weight change.   HENT: Negative for congestion, ear discharge, hearing loss, nosebleeds and sore throat.    Eyes: Negative for pain, discharge and redness.   Respiratory: Negative for cough, chest tightness, shortness of breath and wheezing.    Cardiovascular:  Negative for chest pain and palpitations.   Gastrointestinal: Negative for abdominal distention, abdominal pain, blood in stool, constipation, diarrhea, nausea and vomiting.   Endocrine: Negative for cold intolerance, polydipsia, polyphagia and polyuria.   Genitourinary: Negative for dysuria, flank pain, frequency, hematuria and urgency.   Musculoskeletal: Negative for arthralgias, back pain, joint swelling and myalgias.   Skin: Negative for color change, pallor and rash.   Neurological: Negative for tremors, seizures, syncope, weakness and headaches.   Hematological: Negative for adenopathy. Does not bruise/bleed easily.   Psychiatric/Behavioral: Negative for behavioral problems, confusion, dysphoric mood, hallucinations and suicidal ideas. The patient is not nervous/anxious.        Physical Exam:   Temp:  [97.1 °F (36.2 °C)-98.9 °F (37.2 °C)] 98.2 °F (36.8 °C)  Heart Rate:  [] 98  Resp:  [18] 18  BP: (111-138)/(63-70) 138/67     Physical Exam:  General Appearance:    Alert, cooperative, in no acute distress   Head:    Normocephalic, without obvious abnormality, atraumatic   Eyes:            Lids and lashes normal, conjunctivae and sclerae normal, no   icterus, no pallor, corneas clear, PERRLA   Ears:    Ears appear intact with no abnormalities noted   Throat:   No oral lesions, no thrush, oral mucosa moist   Neck:   No adenopathy, supple, trachea midline, no thyromegaly, no     carotid bruit, no JVD   Back:     No kyphosis present, no scoliosis present, no skin lesions,       erythema or scars, no tenderness to percussion or                   palpation,   range of motion normal   Lungs:     Clear to auscultation,respirations regular, even and                   unlabored    Heart:    Regular rhythm and normal rate, normal S1 and S2, no            murmur, no gallop, no rub, no click   Breast Exam:    Deferred   Abdomen:     Normal bowel sounds, no masses, no organomegaly, soft        nontender, nondistended,  no guarding, no rebound                 tenderness   Genitalia:    Deferred   Extremities:   Moves all extremities well, no edema, no cyanosis, no              redness   Pulses:   Pulses palpable and equal bilaterally   Skin:   No bleeding, bruising or rash   Lymph nodes:   No palpable adenopathy   Neurologic:   Cranial nerves 2 - 12 grossly intact, sensation intact, DTR        present and equal bilaterally      Results Review:     Lab Results (last 24 hours)     Procedure Component Value Units Date/Time    COVID-19 and FLU A/B PCR - Swab, Nasopharynx [[id number]]  (Normal) Collected: 06/16/22 1013    Specimen: Swab from Nasopharynx Updated: 06/16/22 1051     COVID19 Not Detected     Influenza A PCR Not Detected     Influenza B PCR Not Detected    Narrative:      Fact sheet for providers: https://www.fda.gov/media/090911/download    Fact sheet for patients: https://www.fda.gov/media/704168/download    Test performed by PCR.    TISSUE EXAM, [facility] ([facility]) [[id number]] Collected: 06/15/22 1106    Specimen: Tissue from Small Intestine, Duodenum; Tissue from Gastric, Antrum; Tissue from Esophagus Updated: 06/16/22 0710    Basic Metabolic Panel [[id number]]  (Abnormal) Collected: 06/16/22 0532    Specimen: Blood Updated: 06/16/22 0618     Glucose 146 mg/dL      BUN 19 mg/dL      Creatinine 0.52 mg/dL      Sodium 134 mmol/L      Potassium 4.0 mmol/L      Chloride 102 mmol/L      CO2 23.0 mmol/L      Calcium 8.3 mg/dL      BUN/Creatinine Ratio 36.5     Anion Gap 9.0 mmol/L      eGFR 107.1 mL/min/1.73      Comment: National Kidney Foundation and American Society of Nephrology (ASN) Task Force recommended calculation based on the Chronic Kidney Disease Epidemiology Collaboration (CKD-EPI) equation refit without adjustment for race.       Narrative:      GFR Normal >60  Chronic Kidney Disease <60  Kidney Failure <15      CBC & Differential [[id number]]  (Abnormal) Collected: 06/16/22 0532    Specimen: Blood Updated:  06/16/22 0551    Narrative:      The following orders were created for panel order CBC & Differential.  Procedure                               Abnormality         Status                     ---------                               -----------         ------                     CBC Auto Differential[[id number]]        Abnormal            Final result                 Please view results for these tests on the individual orders.    CBC Auto Differential [[id number]]  (Abnormal) Collected: 06/16/22 0532    Specimen: Blood Updated: 06/16/22 0551     WBC 13.31 10*3/mm3      RBC 3.93 10*6/mm3      Hemoglobin 10.5 g/dL      Hematocrit 32.5 %      MCV 82.7 fL      MCH 26.7 pg      MCHC 32.3 g/dL      RDW 14.4 %      RDW-SD 43.5 fl      MPV 9.5 fL      Platelets 273 10*3/mm3      Neutrophil % 78.5 %      Lymphocyte % 8.6 %      Monocyte % 8.5 %      Eosinophil % 1.2 %      Basophil % 0.4 %      Immature Grans % 2.8 %      Neutrophils, Absolute 10.45 10*3/mm3      Lymphocytes, Absolute 1.15 10*3/mm3      Monocytes, Absolute 1.13 10*3/mm3      Eosinophils, Absolute 0.16 10*3/mm3      Basophils, Absolute 0.05 10*3/mm3      Immature Grans, Absolute 0.37 10*3/mm3      nRBC 0.0 /100 WBC     Blood Culture - Blood, Hand, Left [[id number]]  (Normal) Collected: 06/12/22 0333    Specimen: Blood from Hand, Left Updated: 06/16/22 0347     Blood Culture No growth at 4 days    Blood Culture - Blood, Arm, Left [[id number]]  (Normal) Collected: 06/12/22 0333    Specimen: Blood from Arm, Left Updated: 06/16/22 0347     Blood Culture No growth at 4 days           I reviewed the patient's new clinical results.  I reviewed the patient's new imaging results and agree with the interpretation.     ASSESSMENT/PLAN:   ASSESSMENT: 1.  Nausea with vomiting and hematemesis, resolved.  2.  Rectal bleeding, resolved.  3.  Anemia, improving.    PLAN: 1.  Continue PPI and current supportive care  2.  Okay to DC from GI standpoint  3.  Add iron supplements  4.   Follow-up in clinic in 1 to 2 weeks  The risks, benefits, and alternatives of this procedure have been discussed with the patient or the responsible party- the patient understands and agrees to proceed.         Karen Kaye MD  06/16/22  16:19 CDT

## 2022-06-16 NOTE — DISCHARGE PLACEMENT REQUEST
"Marcela Ivan (72 y.o. Male)             Date of Birth   1950    Social Security Number       Address   [place] [address]    Home Phone   [phone]    MRN   [id number]       Anabaptist   None    Marital Status                               Admission Date   6/11/22    Admission Type   Emergency    Admitting Provider   Mika Felix MD    Attending Provider   Mika Felix MD    Department, Room/Bed   [address], 309/1       Discharge Date       Discharge Disposition   Skilled Nursing Facility (DC - External)    Discharge Destination                               Attending Provider: Mika Felix MD    Allergies: No Known Allergies    Isolation: None   Infection: None   Code Status: CPR   Advance Care Planning Activity    Ht: 182.9 cm (72.01\")   Wt: 74.3 kg (163 lb 14.4 oz)    Admission Cmt: None   Principal Problem: None                Active Insurance as of 6/11/2022     Primary Coverage     Payor Plan Insurance Group Employer/Plan Group    HUMANA MEDICARE REPLACEMENT HUMANA MEDICARE REPLACEMENT [id number]     Payor Plan Address Payor Plan Phone Number Payor Plan Fax Number Effective Dates    [address] [address] [phone]  1/1/2019 - None Entered    [facility] [id number]       Subscriber Name Subscriber Birth Date Member ID       MARCELA IVAN 1950 [id number]                 Emergency Contacts      (Rel.) Home Phone Work Phone Mobile Phone    SUSAN IVAN (Daughter) -- -- [phone]    MAKENZIE IVAN (Son) -- -- [phone]            Insurance Information                HUMANA MEDICARE REPLACEMENT/HUMANA MEDICARE REPLACEMENT Phone: [phone]    Subscriber: Marcela Ivan Subscriber#: [id number]    Group#: [id number] Precert#: --            Lab Results (last 24 hours)     Procedure Component Value Units Date/Time    COVID-19 and FLU A/B PCR - Swab, Nasopharynx [[id number]]  (Normal) Collected: 06/16/22 " 1013    Specimen: Swab from Nasopharynx Updated: 06/16/22 1051     COVID19 Not Detected     Influenza A PCR Not Detected     Influenza B PCR Not Detected    Narrative:      Fact sheet for providers: https://www.fda.gov/media/981586/download    Fact sheet for patients: https://www.fda.gov/media/275278/download    Test performed by PCR.    TISSUE EXAM, [facility]) [[id number]] Collected: 06/15/22 1106    Specimen: Tissue from Small Intestine, Duodenum; Tissue from Gastric, Antrum; Tissue from Esophagus Updated: 06/16/22 0710    Basic Metabolic Panel [[id number]]  (Abnormal) Collected: 06/16/22 0532    Specimen: Blood Updated: 06/16/22 0618     Glucose 146 mg/dL      BUN 19 mg/dL      Creatinine 0.52 mg/dL      Sodium 134 mmol/L      Potassium 4.0 mmol/L      Chloride 102 mmol/L      CO2 23.0 mmol/L      Calcium 8.3 mg/dL      BUN/Creatinine Ratio 36.5     Anion Gap 9.0 mmol/L      eGFR 107.1 mL/min/1.73      Comment: National Kidney Foundation and American Society of Nephrology (ASN) Task Force recommended calculation based on the Chronic Kidney Disease Epidemiology Collaboration (CKD-EPI) equation refit without adjustment for race.       Narrative:      GFR Normal >60  Chronic Kidney Disease <60  Kidney Failure <15      CBC & Differential [[id number]]  (Abnormal) Collected: 06/16/22 0532    Specimen: Blood Updated: 06/16/22 0551    Narrative:      The following orders were created for panel order CBC & Differential.  Procedure                               Abnormality         Status                     ---------                               -----------         ------                     CBC Auto Differential[[id number]]        Abnormal            Final result                 Please view results for these tests on the individual orders.    CBC Auto Differential [[id number]]  (Abnormal) Collected: 06/16/22 0532    Specimen: Blood Updated: 06/16/22 0551     WBC 13.31 10*3/mm3      RBC 3.93 10*6/mm3       Hemoglobin 10.5 g/dL      Hematocrit 32.5 %      MCV 82.7 fL      MCH 26.7 pg      MCHC 32.3 g/dL      RDW 14.4 %      RDW-SD 43.5 fl      MPV 9.5 fL      Platelets 273 10*3/mm3      Neutrophil % 78.5 %      Lymphocyte % 8.6 %      Monocyte % 8.5 %      Eosinophil % 1.2 %      Basophil % 0.4 %      Immature Grans % 2.8 %      Neutrophils, Absolute 10.45 10*3/mm3      Lymphocytes, Absolute 1.15 10*3/mm3      Monocytes, Absolute 1.13 10*3/mm3      Eosinophils, Absolute 0.16 10*3/mm3      Basophils, Absolute 0.05 10*3/mm3      Immature Grans, Absolute 0.37 10*3/mm3      nRBC 0.0 /100 WBC     Blood Culture - Blood, Hand, Left [[id number]]  (Normal) Collected: 06/12/22 0333    Specimen: Blood from Hand, Left Updated: 06/16/22 0347     Blood Culture No growth at 4 days    Blood Culture - Blood, Arm, Left [[id number]]  (Normal) Collected: 06/12/22 0333    Specimen: Blood from Arm, Left Updated: 06/16/22 0347     Blood Culture No growth at 4 days

## 2022-06-16 NOTE — DISCHARGE SUMMARY
[facility] Medicine Services  DISCHARGE SUMMARY       Date of Admission: 6/11/2022  Date of Discharge:  6/16/2022  Primary Care Physician: Estrada Fuentes MD    Presenting Problem/History of Present Illness:  Hydronephrosis, unspecified hydronephrosis type [N13.30]  Sepsis, due to unspecified organism, unspecified whether acute organ dysfunction present (HCC) [A41.9]     Doing well.  Patient reports that he is feeling good, ready to go back to the nursing home.  Denies abdominal pain, denies nausea, denies vomiting.  Per nursing staff, he did very well last night with minimal nausea.  [facility]BI turned down yesterday.    Final Discharge Diagnoses:  Active Hospital Problems    Diagnosis    • Sepsis (HCC)    • Sepsis, due to unspecified organism, unspecified whether acute organ dysfunction present (HCC)    • Hematemesis with nausea      Added automatically from request for surgery [id number]     • Epigastric pain      Added automatically from request for surgery [id number]     • Absolute anemia      Added automatically from request for surgery [id number]         Consults:   Consults     Date and Time Order Name Status Description    6/12/2022  3:03 AM Inpatient Gastroenterology Consult Completed           Procedures Performed: Procedure(s):  ESOPHAGOGASTRODUODENOSCOPY  COLONOSCOPY           06/15 1101 UPPER GI ENDOSCOPY  06/15 1101 COLONOSCOPY    Pertinent Test Results:   Lab Results (most recent)     Procedure Component Value Units Date/Time    TISSUE EXAM, [facility] ([facility]) [[id number]] Collected: 06/15/22 1106    Specimen: Tissue from Small Intestine, Duodenum; Tissue from Gastric, Antrum; Tissue from Esophagus Updated: 06/16/22 0710    Basic Metabolic Panel [[id number]]  (Abnormal) Collected: 06/16/22 0532    Specimen: Blood Updated: 06/16/22 0618     Glucose 146 mg/dL      BUN 19 mg/dL      Creatinine 0.52 mg/dL      Sodium 134 mmol/L      Potassium 4.0 mmol/L      Chloride  102 mmol/L      CO2 23.0 mmol/L      Calcium 8.3 mg/dL      BUN/Creatinine Ratio 36.5     Anion Gap 9.0 mmol/L      eGFR 107.1 mL/min/1.73      Comment: National Kidney Foundation and American Society of Nephrology (ASN) Task Force recommended calculation based on the Chronic Kidney Disease Epidemiology Collaboration (CKD-EPI) equation refit without adjustment for race.       Narrative:      GFR Normal >60  Chronic Kidney Disease <60  Kidney Failure <15      CBC & Differential [[id number]]  (Abnormal) Collected: 06/16/22 0532    Specimen: Blood Updated: 06/16/22 0551    Narrative:      The following orders were created for panel order CBC & Differential.  Procedure                               Abnormality         Status                     ---------                               -----------         ------                     CBC Auto Differential[[id number]]        Abnormal            Final result                 Please view results for these tests on the individual orders.    CBC Auto Differential [[id number]]  (Abnormal) Collected: 06/16/22 0532    Specimen: Blood Updated: 06/16/22 0551     WBC 13.31 10*3/mm3      RBC 3.93 10*6/mm3      Hemoglobin 10.5 g/dL      Hematocrit 32.5 %      MCV 82.7 fL      MCH 26.7 pg      MCHC 32.3 g/dL      RDW 14.4 %      RDW-SD 43.5 fl      MPV 9.5 fL      Platelets 273 10*3/mm3      Neutrophil % 78.5 %      Lymphocyte % 8.6 %      Monocyte % 8.5 %      Eosinophil % 1.2 %      Basophil % 0.4 %      Immature Grans % 2.8 %      Neutrophils, Absolute 10.45 10*3/mm3      Lymphocytes, Absolute 1.15 10*3/mm3      Monocytes, Absolute 1.13 10*3/mm3      Eosinophils, Absolute 0.16 10*3/mm3      Basophils, Absolute 0.05 10*3/mm3      Immature Grans, Absolute 0.37 10*3/mm3      nRBC 0.0 /100 WBC     Blood Culture - Blood, Hand, Left [[id number]]  (Normal) Collected: 06/12/22 0333    Specimen: Blood from Hand, Left Updated: 06/16/22 0347     Blood Culture No growth at 4 days    Blood Culture -  Blood, Arm, Left [[id number]]  (Normal) Collected: 06/12/22 0333    Specimen: Blood from Arm, Left Updated: 06/16/22 0347     Blood Culture No growth at 4 days    Extra Tubes [[id number]] Collected: 06/15/22 1231    Specimen: Blood, Venous Line Updated: 06/15/22 1348    Narrative:      The following orders were created for panel order Extra Tubes.  Procedure                               Abnormality         Status                     ---------                               -----------         ------                     Green Top (Gel)[[id number]]                                  Final result                 Please view results for these tests on the individual orders.    Green Top (Gel) [[id number]] Collected: 06/15/22 1231    Specimen: Blood Updated: 06/15/22 1348     Extra Tube Hold for add-ons.     Comment: Auto resulted.       CBC & Differential [[id number]]  (Abnormal) Collected: 06/15/22 1230    Specimen: Blood Updated: 06/15/22 1251    Narrative:      The following orders were created for panel order CBC & Differential.  Procedure                               Abnormality         Status                     ---------                               -----------         ------                     CBC Auto Differential[[id number]]        Abnormal            Final result                 Please view results for these tests on the individual orders.    CBC Auto Differential [[id number]]  (Abnormal) Collected: 06/15/22 1230    Specimen: Blood Updated: 06/15/22 1251     WBC 11.54 10*3/mm3      RBC 3.55 10*6/mm3      Hemoglobin 10.0 g/dL      Hematocrit 30.0 %      MCV 84.5 fL      MCH 28.2 pg      MCHC 33.3 g/dL      RDW 14.2 %      RDW-SD 44.0 fl      MPV 9.4 fL      Platelets 256 10*3/mm3      Neutrophil % 77.0 %      Lymphocyte % 10.4 %      Monocyte % 8.4 %      Eosinophil % 1.5 %      Basophil % 0.4 %      Immature Grans % 2.3 %      Neutrophils, Absolute 8.88 10*3/mm3      Lymphocytes, Absolute 1.20 10*3/mm3       Monocytes, Absolute 0.97 10*3/mm3      Eosinophils, Absolute 0.17 10*3/mm3      Basophils, Absolute 0.05 10*3/mm3      Immature Grans, Absolute 0.27 10*3/mm3      nRBC 0.0 /100 WBC     Basic Metabolic Panel [[id number]]  (Abnormal) Collected: 06/15/22 0846    Specimen: Blood Updated: 06/15/22 0927     Glucose 105 mg/dL      BUN 18 mg/dL      Creatinine 0.54 mg/dL      Sodium 131 mmol/L      Potassium 3.7 mmol/L      Chloride 100 mmol/L      CO2 22.0 mmol/L      Calcium 8.4 mg/dL      BUN/Creatinine Ratio 33.3     Anion Gap 9.0 mmol/L      eGFR 105.9 mL/min/1.73      Comment: National Kidney Foundation and American Society of Nephrology (ASN) Task Force recommended calculation based on the Chronic Kidney Disease Epidemiology Collaboration (CKD-EPI) equation refit without adjustment for race.       Narrative:      GFR Normal >60  Chronic Kidney Disease <60  Kidney Failure <15      Vancomycin, Trough [[id number]]  (Normal) Collected: 06/15/22 0259    Specimen: Blood Updated: 06/15/22 0329     Vancomycin Trough 12.20 mcg/mL     Vancomycin, Peak [[id number]]  (Abnormal) Collected: 06/14/22 1654    Specimen: Blood Updated: 06/14/22 1738     Vancomycin Peak 40.20 mcg/mL     Magnesium [[id number]]  (Normal) Collected: 06/14/22 0546    Specimen: Blood Updated: 06/14/22 0654     Magnesium 2.0 mg/dL     Extra Tubes [[id number]] Collected: 06/13/22 2010    Specimen: Blood, Venous Line Updated: 06/13/22 2117    Narrative:      The following orders were created for panel order Extra Tubes.  Procedure                               Abnormality         Status                     ---------                               -----------         ------                     Lavender Top[[id number]]                                     Final result               Gold Top - SST[[id number]]                                   Final result                 Please view results for these tests on the individual orders.    Lavender Top [[id number]] Collected:  06/13/22 2010    Specimen: Blood Updated: 06/13/22 2117     Extra Tube hold for add-on     Comment: Auto resulted       Gold Top - SST [[id number]] Collected: 06/13/22 2010    Specimen: Blood Updated: 06/13/22 2117     Extra Tube Hold for add-ons.     Comment: Auto resulted.       Hemoglobin & Hematocrit, Blood [[id number]]  (Abnormal) Collected: 06/13/22 2010    Specimen: Blood Updated: 06/13/22 2029     Hemoglobin 9.4 g/dL      Hematocrit 28.2 %     Urine Culture - Urine, Urine, Catheter [[id number]] Collected: 06/11/22 2319    Specimen: Urine, Catheter Updated: 06/13/22 1127     Urine Culture >100,000 CFU/mL Mixed Marilou Isolated    Narrative:      Specimen contains mixed organisms of questionable pathogenicity suggestive of contamination. If symptoms persist, suggest recollection.  Colonization of the urinary tract without infection is common. Treatment is discouraged unless the patient is symptomatic, pregnant, or undergoing an invasive urologic procedure.    Hemoglobin & Hematocrit, Blood [[id number]]  (Abnormal) Collected: 06/13/22 0747    Specimen: Blood Updated: 06/13/22 0754     Hemoglobin 9.2 g/dL      Hematocrit 27.9 %     Magnesium [[id number]]  (Normal) Collected: 06/13/22 0626    Specimen: Blood Updated: 06/13/22 0716     Magnesium 2.0 mg/dL     Lactic Acid, Plasma [[id number]]  (Normal) Collected: 06/12/22 1327    Specimen: Blood Updated: 06/12/22 1413     Lactate 1.2 mmol/L     CRE Screen by PCR - Swab, Large Intestine, Rectum [[id number]] Collected: 06/12/22 0050    Specimen: Swab from Large Intestine, Rectum Updated: 06/12/22 0304     CRE SCREEN Not Detected     Comment: Test performed by real-time polymerase chain reaction (qPCR).        OXA 48 Strain Not Detected     IMP STRAIN Not Detected     VIM STRAIN Not Detected     NDM Strain Not Detected     KPC Strain Not Detected    Urinalysis With Microscopic If Indicated (No Culture) - Urine, Catheter [[id number]]  (Abnormal) Collected: 06/11/22 9071     Specimen: Urine, Catheter Updated: 06/12/22 0006     Color, UA [place]     Appearance, UA Turbid     pH, UA 8.0     Specific Gravity, UA 1.018     Glucose, UA Negative     Ketones, UA Negative     Bilirubin, UA Small (1+)     Blood, UA Large (3+)     Protein, UA >=300 mg/dL (3+)     Leuk Esterase, UA Large (3+)     Nitrite, UA Negative     Urobilinogen, UA 0.2 E.U./dL    Urinalysis, Microscopic Only - Urine, Catheter [[id number]]  (Abnormal) Collected: 06/11/22 2319    Specimen: Urine, Catheter Updated: 06/12/22 0006     RBC, UA 21-30 /HPF      WBC, UA Too Numerous to Count /HPF      Bacteria, UA 3+ /HPF      Squamous Epithelial Cells, UA       Unable to determine due to loaded field     /HPF     Hyaline Casts, UA       Unable to determine due to loaded field     /LPF     Triple Phosphate Crystals, UA Small/1+ /HPF      Methodology Manual Light Microscopy    STAT Lactic Acid, Reflex [[id number]]  (Normal) Collected: 06/11/22 2219    Specimen: Blood Updated: 06/11/22 2238     Lactate 1.9 mmol/L     Comprehensive Metabolic Panel [[id number]]  (Abnormal) Collected: 06/11/22 1914    Specimen: Blood Updated: 06/11/22 1936     Glucose 187 mg/dL      BUN 56 mg/dL      Creatinine 1.74 mg/dL      Sodium 134 mmol/L      Potassium 5.3 mmol/L      Chloride 96 mmol/L      CO2 20.0 mmol/L      Calcium 9.9 mg/dL      Total Protein 7.0 g/dL      Albumin 3.70 g/dL      ALT (SGPT) 83 U/L      AST (SGOT) 29 U/L      Alkaline Phosphatase 84 U/L      Total Bilirubin 0.8 mg/dL      Globulin 3.3 gm/dL      A/G Ratio 1.1 g/dL      BUN/Creatinine Ratio 32.2     Anion Gap 18.0 mmol/L      eGFR 41.1 mL/min/1.73      Comment: National Kidney Foundation and American Society of Nephrology (ASN) Task Force recommended calculation based on the Chronic Kidney Disease Epidemiology Collaboration (CKD-EPI) equation refit without adjustment for race.       Narrative:      GFR Normal >60  Chronic Kidney Disease <60  Kidney Failure <15      Lactic Acid,  Plasma [[id number]]  (Abnormal) Collected: 06/11/22 1914    Specimen: Blood Updated: 06/11/22 1933     Lactate 2.7 mmol/L     COVID-19 and FLU A/B PCR - Swab, Nasopharynx [[id number]]  (Normal) Collected: 06/11/22 1905    Specimen: Swab from Nasopharynx Updated: 06/11/22 1931     COVID19 Not Detected     Influenza A PCR Not Detected     Influenza B PCR Not Detected    Narrative:      Fact sheet for providers: https://www.fda.gov/media/873208/download    Fact sheet for patients: https://www.fda.gov/media/406530/download    Test performed by PCR.        Imaging Results (Most Recent)     Procedure Component Value Units Date/Time    CT Abdomen Pelvis Without Contrast [[id number]] Collected: 06/11/22 2102     Updated: 06/11/22 2211    Narrative:      EXAM DESCRIPTION:  CT ABDOMEN PELVIS WO CONTRAST  Rad[facility]: CT ABDOMEN PELVIS WITHOUT IV CONTRAST     CLINICAL HISTORY:   72 years  Male;  abd pain, n/v    TECHNOLOGIST NOTES:    TECHNIQUE: Helical CT imaging was obtained of the abdomen and  pelvis with multiplanar reconstructions. This exam was performed  according to our departmental dose-optimization program, which  includes automated exposure control, adjustment of the mA and/or  kV according to patient size and/or use of iterative  reconstruction techniques.     COMPARISON: None currently available    FINDINGS:   Abdomen:  No evidence of acute disease in the visualized lung bases.    There is a PEG tube.  The liver, spleen, pancreas, adrenal glands  and kidneys show no acute abnormality. No evidence of acute  pancreatitis.    There are no calcified gallstones. There is no  gallbladder wall thickening. No pericholecystic fluid.  There is  no gross biliary duct dilatation.  There are no renal stones.  There is moderately severe bilateral hydronephrosis and  hydroureter.      No free air.    There is no significant free fluid.    There is  no significant aneurysmal enlargement of the abdominal aorta.    Pelvis:  There is no  evidence of bowel obstruction.    No herniated bowel  loops.  . No focal inflammatory changes . No evidence of  appendicitis. There is colonic diverticulosis but no focal  diverticulitis.  Evaluation of the bowel is limited when there is  no oral contrast or when the bowel is incompletely opacified with  enteric contrast.. There is no significant free fluid in the  pelvis. The bladder is distended. There is a Hester catheter in  place..          Impression:      1.  Moderately severe bilateral hydronephrosis and hydroureter.  The bladder is distended.  2.  No bowel obstruction, herniated bowel loops or focal  inflammatory changes.  3.  Colonic diverticulosis    Electronically signed by:  Rakesh May MD  6/11/2022 10:09 PM CDT  Workstation: [id number]          Chief Complaint on Day of Discharge: None    Hospital Course:  The patient is a 72 y.o. male who presented to [facility] with chief complaint of nausea, vomiting, abdominal pain, coffee-ground emesis, black stool from SNF.  Patient has a history of stroke with residual weakness, has PEG tube and eats a soft.  Diet.  In the ED he was found to be septic from UTI with hydronephrosis, creatinine of 1.7, WBC of 17, lactic acid 2.7, hemoglobin 13.5.  This was treated and completed antibiotic regimen of vancomycin and cefepime with urology consult.  His scan showed no evidence of stones, his blood cultures have shown no growth in his urine culture was contaminated.    In regards to his GI bleed, he had an EGD with Dr. Kaye which found gastritis, esophagitis, small Charito-Wray tear that was healing and showed no signs of recent bleeding.  He also had a colonoscopy which showed diverticulosis without evidence of diverticulitis and small nonbleeding internal hemorrhoids.  GI would like for the patient to stay on PPI, continue other current medication regimen.    On coronary artery disease day of discharge, his white count did go from 11-13.  However  "believe this was reactionary from the EGD yesterday as the patient has had no signs of worsening illness including no fevers, no worsening pain, normal blood pressure, no tachycardia.    Condition on Discharge: Stable    Physical Exam on Discharge:  /67 (BP Location: Right arm, Patient Position: Lying)   Pulse 103   Temp 98.9 °F (37.2 °C) (Axillary)   Resp 18   Ht 182.9 cm (72.01\")   Wt 74.3 kg (163 lb 14.4 oz)   SpO2 92%   BMI 22.22 kg/m²   Vitals and nursing note reviewed.   Constitutional:       General: She is not in acute distress.     Appearance: Normal appearance.   HENT:      Head: Normocephalic and atraumatic.      Right Ear: External ear normal.      Left Ear: External ear normal.      Nose: Nose normal.      Mouth/Throat:      Mouth: Mucous membranes are moist.   Eyes:      Conjunctiva/sclera: Conjunctivae normal.      Pupils: Pupils are equal, round, and reactive to light.   Cardiovascular:      Rate and Rhythm: Normal rate and regular rhythm.   Pulmonary:      Effort: Pulmonary effort is normal.      Breath sounds: Normal breath sounds.   Abdominal:      General: Abdomen is flat.      Palpations: Abdomen is soft.      Tenderness: There is no abdominal tenderness.  PEG tube intact  Genitourinary:     General:  is normal  Musculoskeletal:         General: No signs of injury. Normal range of motion.      Cervical back: No tenderness.   Skin:     General: Skin is warm and dry.   Neurological:      General: No focal deficit present.      Mental Status: She is alert and oriented to person, place, and time.   Psychiatric:         Mood and Affect: Mood normal.         Behavior: Behavior normal.         Discharge Disposition: Stable, improved      Discharge Medications:     Discharge Medications      ASK your doctor about these medications      Instructions Start Date   docusate sodium 100 MG capsule  Commonly known as: COLACE   100 mg, Oral, 2 Times Daily      FLUoxetine 20 MG capsule  Commonly " known as: PROzac   20 mg, Oral, Daily      lactulose 10 GM/15ML solution  Commonly known as: CHRONULAC   20 g, Oral, As Needed      magnesium hydroxide 400 MG/5ML suspension  Commonly known as: MILK OF MAGNESIA   Oral, Daily PRN      melatonin 5 MG tablet tablet   5 mg, Oral, Nightly      metoclopramide 5 MG tablet  Commonly known as: REGLAN   5 mg, Oral, 4 Times Daily      nicotine 14 MG/24HR patch  Commonly known as: NICODERM CQ   1 patch, Transdermal, Every 24 Hours, 7mg to start on 6/21      omeprazole 20 MG capsule  Commonly known as: priLOSEC   20 mg, Oral, Daily      polyethylene glycol 17 g packet  Commonly known as: MIRALAX   17 g, Oral, Daily      rosuvastatin 20 MG tablet  Commonly known as: CRESTOR   40 mg, Oral, Nightly             Discharge Diet:Soft pureed diet    Activity at Discharge: Per PT instructions    Discharge Care Plan/Instructions:     Sepsis:   Secondary to urinary tract infection.  Supportive care.  Aggressive fluid resuscitation.     Urinary tract infection:   Likely secondary to chronic indwelling Hester.  Hester was changed in the emergency department.  Patient with bilateral hydronephrosis and hydroureter prior to Hester being changed.  Blood culture shows no growth x2 days.  Urine culture was contaminated.  Finished course of broad-spectrum antibiotics.  His WBC did go from 11-13 on final day of discharge although I think this is likely reactionary from the EGD yesterday.  We will order 1 final urinalysis prior to discharge to be followed and reported to nursing home if abnormal.  Follow-up with urology, PCP.     GI bleed:    Continue IV Protonix.  Hemoglobin stable.    EGD done today, awaiting results of EGD but family tells me that he was found to have a Charito-Wray tear, gastritis, esophagitis, nonbleeding internal hemorrhoids, diverticulosis but no acute findings.  Follow-up with GI.     Hyponatremia:  Improved from 131 yesterday to 134 today.  Follow-up with PCP.     Nutrition:  Patient with significant dysphagia requiring tube feedings.  Continue tube feedings and soft puréed diet     Acute kidney injury:   Resolved with hydration.     DVT prophylaxis:   SCDs.    Follow-up Appointments:   Future Appointments   Date Time Provider Department Center   6/22/2022  1:45 PM Karen Kaye MD [facility]       Test Results Pending at Discharge:   Pending Labs     Order Current Status    TISSUE EXAM, [facility]) In process    Blood Culture - Blood, Arm, Left Preliminary result    Blood Culture - Blood, Hand, Left Preliminary result          Time: 34 minutes

## 2022-06-16 NOTE — DISCHARGE PLACEMENT REQUEST
"Marcela Ivan (72 y.o. Male)             Date of Birth   1950    Social Security Number       Address   [place] [address]    Home Phone   [phone]    MRN   [id number]       Mu-ism   None    Marital Status                               Admission Date   6/11/22    Admission Type   Emergency    Admitting Provider   Mika Felix MD    Attending Provider   Mika Felix MD    Department, Room/Bed   [address], 309/1       Discharge Date       Discharge Disposition   Skilled Nursing Facility (DC - External)    Discharge Destination                               Attending Provider: Mika Felix MD    Allergies: No Known Allergies    Isolation: None   Infection: None   Code Status: CPR   Advance Care Planning Activity    Ht: 182.9 cm (72.01\")   Wt: 74.3 kg (163 lb 14.4 oz)    Admission Cmt: None   Principal Problem: None                Active Insurance as of 6/11/2022     Primary Coverage     Payor Plan Insurance Group Employer/Plan Group    HUMANA MEDICARE REPLACEMENT HU[employer]NA MEDICARE REPLACEMENT [id number]     Payor Plan Address Payor Plan Phone Number Payor Plan Fax Number Effective Dates    [address] [address] [phone]  1/1/2019 - None Entered    [facility] [id number]       Subscriber Name Subscriber Birth Date Member ID       MARCELA IVAN 1950 [id number]                 Emergency Contacts      (Rel.) Home Phone Work Phone Mobile Phone    SUSAN IVAN (Daughter) -- -- [phone]    MAKENZIE IVAN (Son) -- -- [phone]            Insurance Information                [employer] MEDICARE REPLACEMENT/[employer] MEDICARE REPLACEMENT Phone: [phone]    Subscriber: Marcela Ivan Subscriber#: [id number]    Group#: [id number] Precert#: --          "

## 2022-06-17 LAB
BACTERIA SPEC AEROBE CULT: NORMAL
BACTERIA SPEC AEROBE CULT: NORMAL

## 2022-06-17 NOTE — PAYOR COMM NOTE
"    Lisha Bernard   [facility]   Case Mangement Extender  (P) [phone]  (F) [phone]    Auth# [id number]  Marcela Paige (72 y.o. Male)             Date of Birth   1950    Social Security Number       [place] [id number]    Home Phone   [phone]    MRN   [id number]       Jewish   None    Marital Status                               Admission Date   6/11/22    Admission Type   Emergency    Admitting Provider   Mika Felix MD    Attending Provider       Department, Room/Bed   [facility] [address] [place], 309/1       Discharge Date   6/16/2022    Discharge Disposition   Skilled Nursing Facility (DC - External)    Discharge Destination                               Attending Provider: (none)   Allergies: No Known Allergies    Isolation: None   Infection: None   Code Status: Prior   Advance Care Planning Activity    Ht: 182.9 cm (72.01\")   Wt: 74.3 kg (163 lb 14.4 oz)    Admission Cmt: None   Principal Problem: None                Active Insurance as of 6/11/2022     Primary Coverage     Payor Plan Insurance Group Employer/Plan Group    HUMANA MEDICARE REPLACEMENT HUMANA MEDICARE REPLACEMENT [id number]     Payor Plan Address Payor Plan Phone Number Payor Plan Fax Number Effective Dates    [address] [address] [phone]  1/1/2019 - None Entered    [facility] [id number]       Subscriber Name Subscriber Birth Date Member ID       MARCELA PAIGE 1950 [id number]                 Emergency Contacts      (Rel.) Home Phone Work Phone Mobile Phone    SUSAN PAIGE (Daughter) -- -- [phone]    MAKENZIE PAIGE (Son) -- -- [phone]               Discharge Summary      Erin Santiago PA-C at 06/16/22 0834     Attestation signed by Benson Ritchie DO at 06/16/22 1653    I have reviewed this documentation and agree.                      [facility] Team " [facility]  DISCHARGE SUMMARY       Date of Admission: 6/11/2022  Date of Discharge:  6/16/2022  Primary Care Physician: Estrada Fuentes MD    Presenting Problem/History of Present Illness:  Hydronephrosis, unspecified hydronephrosis type [N13.30]  Sepsis, due to unspecified organism, unspecified whether acute organ dysfunction present (HCC) [A41.9]     Doing well.  Patient reports that he is feeling good, ready to go back to the nursing home.  Denies abdominal pain, denies nausea, denies vomiting.  Per nursing staff, he did very well last night with minimal nausea.  CBI turned down yesterday.    Final Discharge Diagnoses:  Active Hospital Problems    Diagnosis    • Sepsis (HCC)    • Sepsis, due to unspecified organism, unspecified whether acute organ dysfunction present (HCC)    • Hematemesis with nausea      Added automatically from request for surgery [id number]     • Epigastric pain      Added automatically from request for surgery [id number]     • Absolute anemia      Added automatically from request for surgery [id number]         Consults:   Consults     Date and Time Order Name Status Description    6/12/2022  3:03 AM Inpatient Gastroenterology Consult Completed           Procedures Performed: Procedure(s):  ESOPHAGOGASTRODUODENOSCOPY  COLONOSCOPY           06/15 1101 UPPER GI ENDOSCOPY  06/15 1101 COLONOSCOPY    Pertinent Test Results:   Lab Results (most recent)     Procedure Component Value Units Date/Time    TISSUE EXAM, [facility] ([facility]) [[id number]] Collected: 06/15/22 1106    Specimen: Tissue from Small Intestine, Duodenum; Tissue from Gastric, Antrum; Tissue from Esophagus Updated: 06/16/22 0710    Basic Metabolic Panel [[id number]]  (Abnormal) Collected: 06/16/22 0532    Specimen: Blood Updated: 06/16/22 0618     Glucose 146 mg/dL      BUN 19 mg/dL      Creatinine 0.52 mg/dL      Sodium 134 mmol/L      Potassium 4.0 mmol/L      Chloride 102 mmol/L      CO2 23.0 mmol/L      Calcium  8.3 mg/dL      BUN/Creatinine Ratio 36.5     Anion Gap 9.0 mmol/L      eGFR 107.1 mL/min/1.73      Comment: National Kidney Foundation and American Society of Nephrology (ASN) Task Force recommended calculation based on the Chronic Kidney Disease Epidemiology Collaboration (CKD-EPI) equation refit without adjustment for race.       Narrative:      GFR Normal >60  Chronic Kidney Disease <60  Kidney Failure <15      CBC & Differential [[id number]]  (Abnormal) Collected: 06/16/22 0532    Specimen: Blood Updated: 06/16/22 0551    Narrative:      The following orders were created for panel order CBC & Differential.  Procedure                               Abnormality         Status                     ---------                               -----------         ------                     CBC Auto Differential[[id number]]        Abnormal            Final result                 Please view results for these tests on the individual orders.    CBC Auto Differential [[id number]]  (Abnormal) Collected: 06/16/22 0532    Specimen: Blood Updated: 06/16/22 0551     WBC 13.31 10*3/mm3      RBC 3.93 10*6/mm3      Hemoglobin 10.5 g/dL      Hematocrit 32.5 %      MCV 82.7 fL      MCH 26.7 pg      MCHC 32.3 g/dL      RDW 14.4 %      RDW-SD 43.5 fl      MPV 9.5 fL      Platelets 273 10*3/mm3      Neutrophil % 78.5 %      Lymphocyte % 8.6 %      Monocyte % 8.5 %      Eosinophil % 1.2 %      Basophil % 0.4 %      Immature Grans % 2.8 %      Neutrophils, Absolute 10.45 10*3/mm3      Lymphocytes, Absolute 1.15 10*3/mm3      Monocytes, Absolute 1.13 10*3/mm3      Eosinophils, Absolute 0.16 10*3/mm3      Basophils, Absolute 0.05 10*3/mm3      Immature Grans, Absolute 0.37 10*3/mm3      nRBC 0.0 /100 WBC     Blood Culture - Blood, Hand, Left [[id number]]  (Normal) Collected: 06/12/22 0333    Specimen: Blood from Hand, Left Updated: 06/16/22 0347     Blood Culture No growth at 4 days    Blood Culture - Blood, Arm, Left [[id number]]  (Normal)  Collected: 06/12/22 0333    Specimen: Blood from Arm, Left Updated: 06/16/22 0347     Blood Culture No growth at 4 days    Extra Tubes [[id number]] Collected: 06/15/22 1231    Specimen: Blood, Venous Line Updated: 06/15/22 1348    Narrative:      The following orders were created for panel order Extra Tubes.  Procedure                               Abnormality         Status                     ---------                               -----------         ------                     Green Top (Gel)[[id number]]                                  Final result                 Please view results for these tests on the individual orders.    Green Top (Gel) [[id number]] Collected: 06/15/22 1231    Specimen: Blood Updated: 06/15/22 1348     Extra Tube Hold for add-ons.     Comment: Auto resulted.       CBC & Differential [[id number]]  (Abnormal) Collected: 06/15/22 1230    Specimen: Blood Updated: 06/15/22 1251    Narrative:      The following orders were created for panel order CBC & Differential.  Procedure                               Abnormality         Status                     ---------                               -----------         ------                     CBC Auto Differential[[id number]]        Abnormal            Final result                 Please view results for these tests on the individual orders.    CBC Auto Differential [[id number]]  (Abnormal) Collected: 06/15/22 1230    Specimen: Blood Updated: 06/15/22 1251     WBC 11.54 10*3/mm3      RBC 3.55 10*6/mm3      Hemoglobin 10.0 g/dL      Hematocrit 30.0 %      MCV 84.5 fL      MCH 28.2 pg      MCHC 33.3 g/dL      RDW 14.2 %      RDW-SD 44.0 fl      MPV 9.4 fL      Platelets 256 10*3/mm3      Neutrophil % 77.0 %      Lymphocyte % 10.4 %      Monocyte % 8.4 %      Eosinophil % 1.5 %      Basophil % 0.4 %      Immature Grans % 2.3 %      Neutrophils, Absolute 8.88 10*3/mm3      Lymphocytes, Absolute 1.20 10*3/mm3      Monocytes, Absolute 0.97 10*3/mm3       Eosinophils, Absolute 0.17 10*3/mm3      Basophils, Absolute 0.05 10*3/mm3      Immature Grans, Absolute 0.27 10*3/mm3      nRBC 0.0 /100 WBC     Basic Metabolic Panel [[id number]]  (Abnormal) Collected: 06/15/22 0846    Specimen: Blood Updated: 06/15/22 0927     Glucose 105 mg/dL      BUN 18 mg/dL      Creatinine 0.54 mg/dL      Sodium 131 mmol/L      Potassium 3.7 mmol/L      Chloride 100 mmol/L      CO2 22.0 mmol/L      Calcium 8.4 mg/dL      BUN/Creatinine Ratio 33.3     Anion Gap 9.0 mmol/L      eGFR 105.9 mL/min/1.73      Comment: National Kidney Foundation and American Society of Nephrology (ASN) Task Force recommended calculation based on the Chronic Kidney Disease Epidemiology Collaboration (CKD-EPI) equation refit without adjustment for race.       Narrative:      GFR Normal >60  Chronic Kidney Disease <60  Kidney Failure <15      Vancomycin, Trough [[id number]]  (Normal) Collected: 06/15/22 0259    Specimen: Blood Updated: 06/15/22 0329     Vancomycin Trough 12.20 mcg/mL     Vancomycin, Peak [[id number]]  (Abnormal) Collected: 06/14/22 1654    Specimen: Blood Updated: 06/14/22 1738     Vancomycin Peak 40.20 mcg/mL     Magnesium [[id number]]  (Normal) Collected: 06/14/22 0546    Specimen: Blood Updated: 06/14/22 0654     Magnesium 2.0 mg/dL     Extra Tubes [[id number]] Collected: 06/13/22 2010    Specimen: Blood, Venous Line Updated: 06/13/22 2117    Narrative:      The following orders were created for panel order Extra Tubes.  Procedure                               Abnormality         Status                     ---------                               -----------         ------                     Lavender Top[[id number]]                                     Final result               Gold Top - [facility][[id number]]                                   Final result                 Please view results for these tests on the individual orders.    Lavender Top [[id number]] Collected: 06/13/22 2010    Specimen: Blood  Updated: 06/13/22 2117     Extra Tube hold for add-on     Comment: Auto resulted       Gold Top - [facility] [[id number]] Collected: 06/13/22 2010    Specimen: Blood Updated: 06/13/22 2117     Extra Tube Hold for add-ons.     Comment: Auto resulted.       Hemoglobin & Hematocrit, Blood [[id number]]  (Abnormal) Collected: 06/13/22 2010    Specimen: Blood Updated: 06/13/22 2029     Hemoglobin 9.4 g/dL      Hematocrit 28.2 %     Urine Culture - Urine, Urine, Catheter [[id number]] Collected: 06/11/22 2319    Specimen: Urine, Catheter Updated: 06/13/22 1127     Urine Culture >100,000 CFU/mL Mixed Marilou Isolated    Narrative:      Specimen contains mixed organisms of questionable pathogenicity suggestive of contamination. If symptoms persist, suggest recollection.  Colonization of the urinary tract without infection is common. Treatment is discouraged unless the patient is symptomatic, pregnant, or undergoing an invasive urologic procedure.    Hemoglobin & Hematocrit, Blood [[id number]]  (Abnormal) Collected: 06/13/22 0747    Specimen: Blood Updated: 06/13/22 0754     Hemoglobin 9.2 g/dL      Hematocrit 27.9 %     Magnesium [[id number]]  (Normal) Collected: 06/13/22 0626    Specimen: Blood Updated: 06/13/22 0716     Magnesium 2.0 mg/dL     Lactic Acid, Plasma [[id number]]  (Normal) Collected: 06/12/22 1327    Specimen: Blood Updated: 06/12/22 1413     Lactate 1.2 mmol/L     CRE Screen by PCR - Swab, Large Intestine, Rectum [[id number]] Collected: 06/12/22 0050    Specimen: Swab from Large Intestine, Rectum Updated: 06/12/22 0304     CRE SCREEN Not Detected     Comment: Test performed by real-time polymerase chain reaction (qPCR).        OXA 48 Strain Not Detected     IMP STRAIN Not Detected     VIM STRAIN Not Detected     NDM Strain Not Detected     KPC Strain Not Detected    Urinalysis With Microscopic If Indicated (No Culture) - Urine, Catheter [[id number]]  (Abnormal) Collected: 06/11/22 2319    Specimen: Urine, Catheter Updated:  06/12/22 0006     Color, UA [place]     Appearance, UA Turbid     pH, UA 8.0     Specific Gravity, UA 1.018     Glucose, UA Negative     Ketones, UA Negative     Bilirubin, UA Small (1+)     Blood, UA Large (3+)     Protein, UA >=300 mg/dL (3+)     Leuk Esterase, UA Large (3+)     Nitrite, UA Negative     Urobilinogen, UA 0.2 E.U./dL    Urinalysis, Microscopic Only - Urine, Catheter [[id number]]  (Abnormal) Collected: 06/11/22 2319    Specimen: Urine, Catheter Updated: 06/12/22 0006     RBC, UA 21-30 /HPF      WBC, UA Too Numerous to Count /HPF      Bacteria, UA 3+ /HPF      Squamous Epithelial Cells, UA       Unable to determine due to loaded field     /HPF     Hyaline Casts, UA       Unable to determine due to loaded field     /LPF     Triple Phosphate Crystals, UA Small/1+ /HPF      Methodology Manual Light Microscopy    STAT Lactic Acid, Reflex [[id number]]  (Normal) Collected: 06/11/22 2219    Specimen: Blood Updated: 06/11/22 2238     Lactate 1.9 mmol/L     Comprehensive Metabolic Panel [[id number]]  (Abnormal) Collected: 06/11/22 1914    Specimen: Blood Updated: 06/11/22 1936     Glucose 187 mg/dL      BUN 56 mg/dL      Creatinine 1.74 mg/dL      Sodium 134 mmol/L      Potassium 5.3 mmol/L      Chloride 96 mmol/L      CO2 20.0 mmol/L      Calcium 9.9 mg/dL      Total Protein 7.0 g/dL      Albumin 3.70 g/dL      ALT (SGPT) 83 U/L      AST (SGOT) 29 U/L      Alkaline Phosphatase 84 U/L      Total Bilirubin 0.8 mg/dL      Globulin 3.3 gm/dL      A/G Ratio 1.1 g/dL      BUN/Creatinine Ratio 32.2     Anion Gap 18.0 mmol/L      eGFR 41.1 mL/min/1.73      Comment: National Kidney Foundation and American Society of Nephrology (ASN) Task Force recommended calculation based on the Chronic Kidney Disease Epidemiology Collaboration (CKD-EPI) equation refit without adjustment for race.       Narrative:      GFR Normal >60  Chronic Kidney Disease <60  Kidney Failure <15      Lactic Acid, Plasma [[id number]]  (Abnormal)  Collected: 06/11/22 1914    Specimen: Blood Updated: 06/11/22 1933     Lactate 2.7 mmol/L     COVID-19 and FLU A/B PCR - Swab, Nasopharynx [[id number]]  (Normal) Collected: 06/11/22 1905    Specimen: Swab from Nasopharynx Updated: 06/11/22 1931     COVID19 Not Detected     Influenza A PCR Not Detected     Influenza B PCR Not Detected    Narrative:      Fact sheet for providers: https://www.fda.gov/media/900318/download    Fact sheet for patients: https://www.fda.gov/media/840840/download    Test performed by PCR.        Imaging Results (Most Recent)     Procedure Component Value Units Date/Time    CT Abdomen Pelvis Without Contrast [[id number]] Collected: 06/11/22 2102     Updated: 06/11/22 2211    Narrative:      EXAM DESCRIPTION:  CT ABDOMEN PELVIS WO CONTRAST  RadLex: CT ABDOMEN PELVIS WITHOUT IV CONTRAST     CLINICAL HISTORY:   72 years  Male;  abd pain, n/v    TECHNOLOGIST NOTES:    TECHNIQUE: Helical CT imaging was obtained of the abdomen and  pelvis with multiplanar reconstructions. This exam was performed  according to our departmental dose-optimization program, which  includes automated exposure control, adjustment of the mA and/or  kV according to patient size and/or use of iterative  reconstruction techniques.     COMPARISON: None currently available    FINDINGS:   Abdomen:  No evidence of acute disease in the visualized lung bases.    There is a PEG tube.  The liver, spleen, pancreas, adrenal glands  and kidneys show no acute abnormality. No evidence of acute  pancreatitis.    There are no calcified gallstones. There is no  gallbladder wall thickening. No pericholecystic fluid.  There is  no gross biliary duct dilatation.  There are no renal stones.  There is moderately severe bilateral hydronephrosis and  hydroureter.      No free air.    There is no significant free fluid.    There is  no significant aneurysmal enlargement of the abdominal aorta.    Pelvis:  There is no evidence of bowel obstruction.     No herniated bowel  loops.  . No focal inflammatory changes . No evidence of  appendicitis. There is colonic diverticulosis but no focal  diverticulitis.  Evaluation of the bowel is limited when there is  no oral contrast or when the bowel is incompletely opacified with  enteric contrast.. There is no significant free fluid in the  pelvis. The bladder is distended. There is a Hester catheter in  place..          Impression:      1.  Moderately severe bilateral hydronephrosis and hydroureter.  The bladder is distended.  2.  No bowel obstruction, herniated bowel loops or focal  inflammatory changes.  3.  Colonic diverticulosis    Electronically signed by:  Rakesh May MD  6/11/2022 10:09 PM CDT  Workstation: [id number]          Chief Complaint on Day of Discharge: None    Hospital Course:  The patient is a 72 y.o. male who presented to [facility] with chief complaint of nausea, vomiting, abdominal pain, coffee-ground emesis, black stool from SNF.  Patient has a history of stroke with residual weakness, has PEG tube and eats a soft.  Diet.  In the ED he was found to be septic from UTI with hydronephrosis, creatinine of 1.7, WBC of 17, lactic acid 2.7, hemoglobin 13.5.  This was treated and completed antibiotic regimen of vancomycin and cefepime with urology consult.  His scan showed no evidence of stones, his blood cultures have shown no growth in his urine culture was contaminated.    In regards to his GI bleed, he had an EGD with Dr. Kaye which found gastritis, esophagitis, small Charito-Wray tear that was healing and showed no signs of recent bleeding.  He also had a colonoscopy which showed diverticulosis without evidence of diverticulitis and small nonbleeding internal hemorrhoids.  GI would like for the patient to stay on PPI, continue other current medication regimen.    On coronary artery disease day of discharge, his white count did go from 11-13.  However believe this was reactionary from the  "EGD yesterday as the patient has had no signs of worsening illness including no fevers, no worsening pain, normal blood pressure, no tachycardia.    Condition on Discharge: Stable    Physical Exam on Discharge:  /67 (BP Location: Right arm, Patient Position: Lying)   Pulse 103   Temp 98.9 °F (37.2 °C) (Axillary)   Resp 18   Ht 182.9 cm (72.01\")   Wt 74.3 kg (163 lb 14.4 oz)   SpO2 92%   BMI 22.22 kg/m²   Vitals and nursing note reviewed.   Constitutional:       General: She is not in acute distress.     Appearance: Normal appearance.   HENT:      Head: Normocephalic and atraumatic.      Right Ear: External ear normal.      Left Ear: External ear normal.      Nose: Nose normal.      Mouth/Throat:      Mouth: Mucous membranes are moist.   Eyes:      Conjunctiva/sclera: Conjunctivae normal.      Pupils: Pupils are equal, round, and reactive to light.   Cardiovascular:      Rate and Rhythm: Normal rate and regular rhythm.   Pulmonary:      Effort: Pulmonary effort is normal.      Breath sounds: Normal breath sounds.   Abdominal:      General: Abdomen is flat.      Palpations: Abdomen is soft.      Tenderness: There is no abdominal tenderness.  PEG tube intact  Genitourinary:     General:  is normal  Musculoskeletal:         General: No signs of injury. Normal range of motion.      Cervical back: No tenderness.   Skin:     General: Skin is warm and dry.   Neurological:      General: No focal deficit present.      Mental Status: She is alert and oriented to person, place, and time.   Psychiatric:         Mood and Affect: Mood normal.         Behavior: Behavior normal.         Discharge Disposition: Stable, improved      Discharge Medications:     Discharge Medications      ASK your doctor about these medications      Instructions Start Date   docusate sodium 100 MG capsule  Commonly known as: COLACE   100 mg, Oral, 2 Times Daily      FLUoxetine 20 MG capsule  Commonly known as: PROzac   20 mg, Oral, Daily   "    lactulose 10 GM/15ML solution  Commonly known as: CHRONULAC   20 g, Oral, As Needed      magnesium hydroxide 400 MG/5ML suspension  Commonly known as: MILK OF MAGNESIA   Oral, Daily PRN      melatonin 5 MG tablet tablet   5 mg, Oral, Nightly      metoclopramide 5 MG tablet  Commonly known as: REGLAN   5 mg, Oral, 4 Times Daily      nicotine 14 MG/24HR patch  Commonly known as: NICODERM CQ   1 patch, Transdermal, Every 24 Hours, 7mg to start on 6/21      omeprazole 20 MG capsule  Commonly known as: priLOSEC   20 mg, Oral, Daily      polyethylene glycol 17 g packet  Commonly known as: MIRALAX   17 g, Oral, Daily      rosuvastatin 20 MG tablet  Commonly known as: CRESTOR   40 mg, Oral, Nightly             Discharge Diet:Soft pureed diet    Activity at Discharge: Per PT instructions    Discharge Care Plan/Instructions:     Sepsis:   Secondary to urinary tract infection.  Supportive care.  Aggressive fluid resuscitation.     Urinary tract infection:   Likely secondary to chronic indwelling Hester.  Hester was changed in the emergency department.  Patient with bilateral hydronephrosis and hydroureter prior to Hester being changed.  Blood culture shows no growth x2 days.  Urine culture was contaminated.  Finished course of broad-spectrum antibiotics.  His WBC did go from 11-13 on final day of discharge although I think this is likely reactionary from the EGD yesterday.  We will order 1 final urinalysis prior to discharge to be followed and reported to nursing home if abnormal.  Follow-up with urology, PCP.     GI bleed:    Continue IV Protonix.  Hemoglobin stable.    EGD done today, awaiting results of EGD but family tells me that he was found to have a Charito-Wray tear, gastritis, esophagitis, nonbleeding internal hemorrhoids, diverticulosis but no acute findings.  Follow-up with GI.     Hyponatremia:  Improved from 131 yesterday to 134 today.  Follow-up with PCP.     Nutrition: Patient with significant dysphagia  requiring tube feedings.  Continue tube feedings and soft puréed diet     Acute kidney injury:   Resolved with hydration.     DVT prophylaxis:   SCDs.    Follow-up Appointments:   Future Appointments   Date Time Provider Department Center   6/22/2022  1:45 PM Kraen Kaye MD [facility]       Test Results Pending at Discharge:   Pending Labs     Order Current Status    TISSUE EXAM, [facility]) In process    Blood Culture - Blood, Arm, Left Preliminary result    Blood Culture - Blood, Hand, Left Preliminary result          Time: 34 minutes                Electronically signed by Benson Ritchie DO at 06/16/22 6286

## 2022-06-20 LAB — REF LAB TEST METHOD: NORMAL

## 2022-06-23 ENCOUNTER — OUTSIDE FACILITY SERVICE (OUTPATIENT)
Dept: FAMILY MEDICINE CLINIC | Facility: CLINIC | Age: 72
End: 2022-06-23

## 2022-06-23 PROCEDURE — 99308 SBSQ NF CARE LOW MDM 20: CPT | Performed by: FAMILY MEDICINE

## 2022-06-24 ENCOUNTER — OFFICE VISIT (OUTPATIENT)
Dept: GASTROENTEROLOGY | Facility: CLINIC | Age: 72
End: 2022-06-24

## 2022-06-24 ENCOUNTER — PREP FOR SURGERY (OUTPATIENT)
Dept: OTHER | Facility: HOSPITAL | Age: 72
End: 2022-06-24

## 2022-06-24 ENCOUNTER — LAB (OUTPATIENT)
Dept: LAB | Facility: HOSPITAL | Age: 72
End: 2022-06-24

## 2022-06-24 VITALS
HEIGHT: 72 IN | HEART RATE: 105 BPM | SYSTOLIC BLOOD PRESSURE: 156 MMHG | DIASTOLIC BLOOD PRESSURE: 66 MMHG | BODY MASS INDEX: 22.23 KG/M2

## 2022-06-24 DIAGNOSIS — R33.9 URINARY RETENTION: Primary | ICD-10-CM

## 2022-06-24 DIAGNOSIS — D50.8 OTHER IRON DEFICIENCY ANEMIA: ICD-10-CM

## 2022-06-24 DIAGNOSIS — D50.8 OTHER IRON DEFICIENCY ANEMIA: Primary | ICD-10-CM

## 2022-06-24 LAB
DEPRECATED RDW RBC AUTO: 42 FL (ref 37–54)
ERYTHROCYTE [DISTWIDTH] IN BLOOD BY AUTOMATED COUNT: 13.9 % (ref 12.3–15.4)
HCT VFR BLD AUTO: 37.7 % (ref 37.5–51)
HGB BLD-MCNC: 12.5 G/DL (ref 13–17.7)
HOLD SPECIMEN: NORMAL
MCH RBC QN AUTO: 27.8 PG (ref 26.6–33)
MCHC RBC AUTO-ENTMCNC: 33.2 G/DL (ref 31.5–35.7)
MCV RBC AUTO: 84 FL (ref 79–97)
PLATELET # BLD AUTO: 353 10*3/MM3 (ref 140–450)
PMV BLD AUTO: 9.4 FL (ref 6–12)
RBC # BLD AUTO: 4.49 10*6/MM3 (ref 4.14–5.8)
WBC NRBC COR # BLD: 10.07 10*3/MM3 (ref 3.4–10.8)

## 2022-06-24 PROCEDURE — 99213 OFFICE O/P EST LOW 20 MIN: CPT | Performed by: INTERNAL MEDICINE

## 2022-06-24 PROCEDURE — 85027 COMPLETE CBC AUTOMATED: CPT

## 2022-06-24 PROCEDURE — 36415 COLL VENOUS BLD VENIPUNCTURE: CPT

## 2022-06-27 ENCOUNTER — TRANSCRIBE ORDERS (OUTPATIENT)
Dept: HOME HEALTH SERVICES | Facility: CLINIC | Age: 72
End: 2022-06-27

## 2022-06-27 PROBLEM — R33.9 URINARY RETENTION: Status: ACTIVE | Noted: 2022-06-27

## 2022-06-28 ENCOUNTER — HOME CARE VISIT (OUTPATIENT)
Dept: HOSPICE | Facility: HOSPICE | Age: 72
End: 2022-06-28

## 2022-06-28 ENCOUNTER — HOME CARE VISIT (OUTPATIENT)
Dept: HOME HEALTH SERVICES | Facility: CLINIC | Age: 72
End: 2022-06-28

## 2022-06-28 ENCOUNTER — TRANSCRIBE ORDERS (OUTPATIENT)
Dept: HOME HEALTH SERVICES | Facility: CLINIC | Age: 72
End: 2022-06-28

## 2022-06-28 ENCOUNTER — HOSPICE ADMISSION (OUTPATIENT)
Dept: HOSPICE | Facility: HOSPICE | Age: 72
End: 2022-06-28

## 2022-06-28 VITALS
TEMPERATURE: 98.2 F | RESPIRATION RATE: 20 BRPM | HEART RATE: 112 BPM | SYSTOLIC BLOOD PRESSURE: 122 MMHG | DIASTOLIC BLOOD PRESSURE: 68 MMHG

## 2022-06-28 DIAGNOSIS — I63.9 CEREBROVASCULAR ACCIDENT (CVA), UNSPECIFIED MECHANISM: Primary | ICD-10-CM

## 2022-06-28 PROCEDURE — A9270 NON-COVERED ITEM OR SERVICE: HCPCS

## 2022-06-28 PROCEDURE — G0299 HHS/HOSPICE OF RN EA 15 MIN: HCPCS

## 2022-06-28 PROCEDURE — 6520001 HSPC ROUTINE CARE

## 2022-06-29 ENCOUNTER — HOME CARE VISIT (OUTPATIENT)
Dept: HOME HEALTH SERVICES | Facility: CLINIC | Age: 72
End: 2022-06-29

## 2022-06-29 PROCEDURE — A9270 NON-COVERED ITEM OR SERVICE: HCPCS

## 2022-06-29 PROCEDURE — G0155 HHCP-SVS OF CSW,EA 15 MIN: HCPCS

## 2022-06-29 PROCEDURE — 6520001 HSPC ROUTINE CARE

## 2022-06-29 NOTE — HOSPICE
.Patient admitted to [facility] with diagnosis of: Cerebrovascular accident     Uncomfortable because of pain?  No Norco order received proactively    Hospitalization: No     Other life Sustaining measures (IV/IV antibiotic, tube feeding, dialysis, intubation): daughter decided no to IV/IV antibiotics and dialysis. Tube feeding and intubation are interventions that she wishes    CPR/DNR The patient and daughter wish for CPR at this time     If patient has history of dyspnea, no history of dyspnea    All care Plans will Collaborate with [facility]     Discussed Spiritual/Existential concerns with: Discussed with daughter Ruthann who has no Spiritual or existential concerns at this time      Admission booklet explained and instruction given on hospice availability and how to access nurse 24 hour/day     Facility aware of Covid-19 prevention practices and have plans in place for it's prevention    One year and 6 months ago the patient was completely independent. 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 one month ago the patient began to exhibit periods of confusion. One week ago the patient remained unchanged. The daughter has chosen hospice as the patient has became content with his condition and is no longer attempting to improve (not participating fully in PT, OT or speech therapy)

## 2022-06-30 ENCOUNTER — HOME CARE VISIT (OUTPATIENT)
Dept: HOME HEALTH SERVICES | Facility: CLINIC | Age: 72
End: 2022-06-30

## 2022-06-30 PROCEDURE — 6520001 HSPC ROUTINE CARE

## 2022-06-30 NOTE — HOSPICE
Signee completed phone call to Ruthann to arrange visit and explain purpose of visit. Ruthann said that she was no longer convinced that Hospice is what her father needed based on information that she read post admission to Hospice. She expressed concern as to whether patient could benefit from treatment based on what she recently read on My Chart. She said that she noted a diagnosis of kidney failure that she wasn't made aware of. Signee listened and normalized her emotions related to her convincing her father that he was at end of life without an understanding of medical prognosis. Ruthann said that she thought since he lacked motivation with PT/OT that there was no hope. Signee encouraged Ruthann to get answers to medical questions and that I would visit with patient and inform RN case manager and Director of her concerns.     Arrived at [facility] finding patient in wheelchair in dining room neat, clean and oriented. We went to his room and completed assessment. Per introduction patient was tearful and noted depression as he was not sure of his diagnosis and why he was with Hospice. Signee listened and normalized emotions. Patient was tearful during the entire assessment and Signee utilizing empowerment interventions and supported his concerns with answers to be forthcoming. Signee also indicated that his daughter too was working on getting answers. Patient said that he was  and had one adult son and Ruthann who he said that he had a positive relationship with both. Patient agreed for Signee to return to offer emotional support and wheeled him back to the dining room where Signee and CNA continued to offer emotional support and CNA stayed with patient to assist him with his meal.     Signee consulted with staff ROHINI Carrion and advised of aforementioned. She indicated that medical records didn't reflect patient's diagnoses of Kidney Failure. She noted that patient had been depressed and wasn't able to note whether or not he  was on antidepressants. Signee had consult with Trudy,  and Laurence, business office who both shared about conversations with Ruthann and noted patient's progress with PT to the point he was able to wheel himself down the hallway escorting his sister who came to visit out the door. Laurence indicated that the copy of advance directives was given to [facility] and by mistake she didn't make a copy for the facility. Signee agreed to have copy faxed to Trudy.     Anjelicaee consulted with Tanya, director who advised that she will review medical records, consult with Dr. Kohler and will contact Ruthann with answers that she is seeking.     Signee called and informed Ruthann to expect call from Tanya. Ruthann verbalized understanding and agreed to contact Agency as needed. Ruthann and Juan agreed that I would visit with patient to offer emotional support and welcomed her to join us. It was decided that Signee will develop case plan and set visits up pending outcome of medical findings.

## 2022-07-01 PROCEDURE — 6520001 HSPC ROUTINE CARE

## 2022-07-02 PROCEDURE — 6520001 HSPC ROUTINE CARE

## 2022-07-03 ENCOUNTER — HOME CARE VISIT (OUTPATIENT)
Dept: HOME HEALTH SERVICES | Facility: CLINIC | Age: 72
End: 2022-07-03

## 2022-07-03 PROCEDURE — G0299 HHS/HOSPICE OF RN EA 15 MIN: HCPCS

## 2022-07-03 PROCEDURE — 6520001 HSPC ROUTINE CARE

## 2022-07-04 PROCEDURE — 6520001 HSPC ROUTINE CARE

## 2022-07-05 VITALS
TEMPERATURE: 97.5 F | RESPIRATION RATE: 18 BRPM | DIASTOLIC BLOOD PRESSURE: 60 MMHG | SYSTOLIC BLOOD PRESSURE: 140 MMHG | HEART RATE: 83 BPM

## 2022-07-05 PROCEDURE — 6520001 HSPC ROUTINE CARE

## 2022-07-05 NOTE — HOSPICE
"recieved pt laying in bed with eyes open pt is alert and verbally responsive replies \"im doing alright i guess\"  has left weakness noted both feet has heel protectors intact no cough or congestion noted.  he require total care for all adl's 6/6 bathing dressing bowel and bladder and tranfers.  he has clifton cath intact paten and draing at bedside with yellow urine in collection bag. Pt has peg tube intact to abdomen size 20fr 20ml bulb no drainage or redness noted to area myron.  recieves peptamune 75ml/hr x8/day.  spoke with charge regarding pt condiiton and staff denied any needs or any acute changes in condition.  Left hospice notebook to with charge nurse and instructed to call hospice for any questions or acute changes in condition"

## 2022-07-06 ENCOUNTER — HOME CARE VISIT (OUTPATIENT)
Dept: HOME HEALTH SERVICES | Facility: CLINIC | Age: 72
End: 2022-07-06

## 2022-07-06 PROCEDURE — 6520001 HSPC ROUTINE CARE

## 2022-07-07 PROCEDURE — 6520001 HSPC ROUTINE CARE

## 2022-07-08 PROCEDURE — 6520001 HSPC ROUTINE CARE

## 2022-07-09 PROCEDURE — 6520001 HSPC ROUTINE CARE

## 2022-07-10 ENCOUNTER — HOME CARE VISIT (OUTPATIENT)
Dept: HOME HEALTH SERVICES | Facility: CLINIC | Age: 72
End: 2022-07-10

## 2022-07-10 PROCEDURE — 6520001 HSPC ROUTINE CARE

## 2022-07-10 PROCEDURE — G0299 HHS/HOSPICE OF RN EA 15 MIN: HCPCS

## 2022-07-11 VITALS
SYSTOLIC BLOOD PRESSURE: 140 MMHG | OXYGEN SATURATION: 96 % | DIASTOLIC BLOOD PRESSURE: 80 MMHG | HEART RATE: 93 BPM | TEMPERATURE: 98 F | RESPIRATION RATE: 20 BRPM

## 2022-07-11 PROCEDURE — 6520001 HSPC ROUTINE CARE

## 2022-07-11 NOTE — PROGRESS NOTES
Chief Complaint   Patient presents with   • Hospital Follow Up Visit       Subjective    Fer Ivan is a 72 y.o. male.    History of Present Illness  Patient presented to GI clinic for follow-up visit today.  Has occasional symptoms with abdominal pain.  Denies nausea or vomiting.  Bowel movements are regular.  Weight is stable.  Colonoscopy was consistent with diverticulosis, hemorrhoids and poor prep.  EGD was consistent with hiatal hernia, esophagitis, gastritis, Charito-Wray tear and a PEG tube in place.  Path was consistent with reactive gastropathy.  Most recent hemoglobin was 10.9.       The following portions of the patient's history were reviewed and updated as appropriate:   Past Medical History:   Diagnosis Date   • Stroke (HCC)      Past Surgical History:   Procedure Laterality Date   • COLONOSCOPY N/A 6/15/2022    Procedure: COLONOSCOPY;  Surgeon: Karen Kaye MD;  Location: [facility] ENDOSCOPY;  Service: Gastroenterology;  Laterality: N/A;   • ENDOSCOPY N/A 6/15/2022    Procedure: ESOPHAGOGASTRODUODENOSCOPY;  Surgeon: Karen Kaye MD;  Location: [facility] ENDOSCOPY;  Service: Gastroenterology;  Laterality: N/A;   • PEG TUBE INSERTION Left      History reviewed. No pertinent family history.    Prior to Admission medications    Medication Sig Start Date End Date Taking? Authorizing Provider   rosuvastatin (CRESTOR) 20 MG tablet Take 40 mg by mouth Every Night.  6/28/22 Yes Provider, MD Erica   baclofen (LIORESAL) 10 MG tablet Take 1 tablet by mouth Every 8 (Eight) Hours As Needed for Muscle Spasms. 6/28/22   Michelle Kohler MD   docusate sodium (COLACE) 100 MG capsule Take 1 capsule by mouth 2 (Two) Times a Day. 6/28/22   Michelle Kohler MD   FLUoxetine (PROzac) 20 MG capsule Take 1 capsule by mouth Daily. 6/28/22   Michelle Kohler MD   gabapentin (NEURONTIN) 100 MG capsule Take 2 capsules by mouth 3 (Three) Times a Day. 6/28/22   Michelle Kohler MD   HYDROcodone-acetaminophen  (NORCO) 5-325 MG per tablet Take 1 tablet by mouth Every 6 (Six) Hours As Needed for Moderate Pain . 6/28/22   Michelle Kohler MD   lactulose 20 GM/30ML solution solution Take 30 mL by mouth Daily As Needed (constipation). 6/28/22   Michelle Kohler MD   magnesium hydroxide (Milk of Magnesia) 400 MG/5ML suspension Take 30 mL by mouth Daily As Needed for Constipation. 6/28/22   Michelle Kohler MD   Melatonin 10 MG tablet Take 1 tablet by mouth Every Night. 6/28/22   Michelle Kohler MD   metoclopramide (REGLAN) 10 MG tablet Take 1 tablet by mouth 4 (Four) Times a Day. 6/28/22   Michelle Kohler MD   nicotine (Nicotine Step 3) 7 MG/24HR patch Place 1 patch on the skin as directed by provider Daily. 6/28/22   Michelle Kohler MD   Omeprazole 20 MG tablet delayed-release Take 1 capsule by mouth Daily. 6/28/22   Michelle Kohler MD   polyethylene glycol (MiraLax) 17 GM/SCOOP powder Take 17 g by mouth Daily. 6/28/22   Michelle Kohler MD     No Known Allergies  Social History     Socioeconomic History   • Marital status:    Tobacco Use   • Smoking status: Never Smoker   • Smokeless tobacco: Never Used   Vaping Use   • Vaping Use: Never used   Substance and Sexual Activity   • Alcohol use: Never   • Drug use: Never   • Sexual activity: Not Currently       Review of Systems  Review of Systems   Constitutional: Negative for chills, fatigue, fever and unexpected weight change.   HENT: Negative for congestion, ear discharge, hearing loss, nosebleeds and sore throat.    Eyes: Negative for pain, discharge and redness.   Respiratory: Negative for cough, chest tightness, shortness of breath and wheezing.    Cardiovascular: Negative for chest pain and palpitations.   Gastrointestinal: Positive for abdominal pain. Negative for abdominal distention, blood in stool, constipation, diarrhea, nausea and vomiting.   Endocrine: Negative for cold intolerance, polydipsia, polyphagia and polyuria.   Genitourinary:  "Negative for dysuria, flank pain, frequency, hematuria and urgency.   Musculoskeletal: Negative for arthralgias, back pain, joint swelling and myalgias.   Skin: Negative for color change, pallor and rash.   Neurological: Negative for tremors, seizures, syncope, weakness and headaches.   Hematological: Negative for adenopathy. Does not bruise/bleed easily.   Psychiatric/Behavioral: Negative for behavioral problems, confusion, dysphoric mood, hallucinations and suicidal ideas. The patient is not nervous/anxious.         /66   Pulse 105   Ht 182.9 cm (72\")   BMI 22.23 kg/m²     Objective    Physical Exam  Constitutional:       Appearance: He is well-developed.   HENT:      Head: Normocephalic and atraumatic.   Eyes:      Conjunctiva/sclera: Conjunctivae normal.      Pupils: Pupils are equal, round, and reactive to light.   Neck:      Thyroid: No thyromegaly.   Cardiovascular:      Rate and Rhythm: Normal rate and regular rhythm.      Heart sounds: Normal heart sounds. No murmur heard.  Pulmonary:      Effort: Pulmonary effort is normal.      Breath sounds: Normal breath sounds. No wheezing.   Abdominal:      General: Bowel sounds are normal. There is no distension.      Palpations: Abdomen is soft. There is no mass.      Tenderness: There is no abdominal tenderness.      Hernia: No hernia is present.   Genitourinary:     Comments: No lesions noted  Musculoskeletal:         General: No tenderness. Normal range of motion.      Cervical back: Normal range of motion and neck supple.   Lymphadenopathy:      Cervical: No cervical adenopathy.   Skin:     General: Skin is warm and dry.      Findings: No rash.   Neurological:      Mental Status: He is alert and oriented to person, place, and time.      Cranial Nerves: No cranial nerve deficit.   Psychiatric:         Thought Content: Thought content normal.       No results displayed because visit has over 200 results.        Assessment & Plan      1. Other iron " deficiency anemia    1.  Epigastric pain with nausea vomiting and hematemesis, resolved.  Likely due to Charito-Wray tear.  2.  Rectal bleeding, resolved.  Likely due to hemorrhoids.  3.  Anemia, improving.  Continue iron supplements.  Obtain CBC today.      Orders placed during this encounter include:  Orders Placed This Encounter   Procedures   • CBC (No Diff)     Standing Status:   Future     Number of Occurrences:   1     Order Specific Question:   Release to patient     Answer:   Immediate       * Surgery not found *    Review and/or summary of lab tests, radiology, procedures, medications. Review and summary of old records and obtaining of history. The risks and benefits of my recommendations, as well as other treatment options were discussed with the patient today. Questions were answered.    No orders of the defined types were placed in this encounter.      Follow-up: Return in about 1 month (around 7/24/2022).               Results for orders placed or performed in visit on 06/24/22   Green Top (Gel)   Result Value Ref Range    Extra Tube Hold for add-ons.    CBC (No Diff)    Specimen: Blood   Result Value Ref Range    WBC 10.07 3.40 - 10.80 10*3/mm3    RBC 4.49 4.14 - 5.80 10*6/mm3    Hemoglobin 12.5 (L) 13.0 - 17.7 g/dL    Hematocrit 37.7 37.5 - 51.0 %    MCV 84.0 79.0 - 97.0 fL    MCH 27.8 26.6 - 33.0 pg    MCHC 33.2 31.5 - 35.7 g/dL    RDW 13.9 12.3 - 15.4 %    RDW-SD 42.0 37.0 - 54.0 fl    MPV 9.4 6.0 - 12.0 fL    Platelets 353 140 - 450 10*3/mm3   Results for orders placed or performed during the hospital encounter of 06/11/22   CRE Screen by PCR - Swab, Large Intestine, Rectum    Specimen: Large Intestine, Rectum; Swab   Result Value Ref Range    CRE SCREEN Not Detected Not Detected, Invalid    OXA 48 Strain Not Detected     IMP STRAIN Not Detected     VIM STRAIN Not Detected     NDM Strain Not Detected     KPC Strain Not Detected    PREVIOUS HISTORY    Specimen: Blood   Result Value Ref Range     Previous History No record on File    STAT Lactic Acid, Reflex    Specimen: Blood   Result Value Ref Range    Lactate 1.9 0.5 - 2.0 mmol/L   Gold Top - SST   Result Value Ref Range    Extra Tube Hold for add-ons.    Green Top (Gel)   Result Value Ref Range    Extra Tube Hold for add-ons.    COVID-19 and FLU A/B PCR - Swab, Nasopharynx    Specimen: Nasopharynx; Swab   Result Value Ref Range    COVID19 Not Detected Not Detected - Ref. Range    Influenza A PCR Not Detected Not Detected    Influenza B PCR Not Detected Not Detected   COVID-19 and FLU A/B PCR - Swab, Nasopharynx    Specimen: Nasopharynx; Swab   Result Value Ref Range    COVID19 Not Detected Not Detected - Ref. Range    Influenza A PCR Not Detected Not Detected    Influenza B PCR Not Detected Not Detected   TISSUE EXAM, [facility] ([facility])    Specimen: A: Small Intestine, Duodenum; Tissue    B: Gastric, Antrum; Tissue    C: Esophagus; Tissue   Result Value Ref Range    Reference Lab Report       [facility]  [address] [place] [address]  Phone: [phone] or [phone]  Fax: [phone]  Timo Blanchard M.D., Medical Director    PATIENT NAME                           LABORATORY NO.  [id number]  MARCELA PAIGE                           [id number]  [id number]                         AGE              SEX  N           CLIENT REF #  [facility]           72      1950      [id number]   [id number]    Marana                       REQUESTING M.D.     ATTENDING M.D.     COPY TO[address]                 SOUMYA MAYER MICHAEL  [address]  DATE COLLECTED      DATE RECEIVED      DATE REPORTED  06/15/2022          2022         2022    DIAGNOSIS:  A.   DUODENUM, BIOPSY:  No significant histologic abnormality  B.   ANTRUM, BIOPSY:  Reactive gastropathy  C.   GE JUNCTION:  Reactive gastric and squamous mucosa  Negative  for specialized  "Meehan's  mucosa    JBS/sm    COMMENT:  I ordered AB/PAS stain on block C1 to assist with the  differential diagnosis between reactive gastric mucosa and specialized Meehan's  mucosa.  AB/PAS stain shows no goblet cells with dual staining in the specimen.  Control tissue stains as expected.    CLINICAL HISTORY:  Hematemesis with nausea, epigastric pain, other iron deficiency anemia    SPECIMENS RECEIVED:  A.  DUODENUM, BIOPSY  B.  ANTRUM, BIOPSY  C.  GE JUNCTION    MICROSCOPIC DESCRIPTION:  Tissue blocks are prepared and slides are examined microscopically on all  specimens. See diagnosis for details.    Professional interpretation rendered by Bridger Messer M.D. at [facility], [address] [place] [address].    GROSS DESCRIPTION:  A.  Specimen is received in 1 formalin filled container labeled \"duodenum  cold biopsy\" and consists of 2 portions of tan soft tissue measuring 0.6 to  0.3 x 0.2 cm.  The specimen is submitted entirely in 1 cassette.  BW  B.  Specimen is received  in 1 formalin filled container labeled \"antrum cold  biopsy\" and consists of 3 portions of tan soft tissue measuring 0.5 x 0.4 x  0.2 cm.  Specimen is submitted entirely in 1 cassette.  C.  Specimen is received in 1 formalin filled container labeled \"GE junction  cold biopsy\" and consists of a single portion of tan soft tissue measuring  0.3 x 0.3 x 0.1 cm.  The specimen is submitted entirely in 1 cassette.    REVIEWED, DIAGNOSED AND ELECTRONICALLY  SIGNED BY:    Bridger Messer M.D.  CPT CODES:  88305x3, 27049     Urinalysis, Microscopic Only - Urine, Catheter    Specimen: Urine, Catheter   Result Value Ref Range    RBC, UA 21-30 (A) None Seen /HPF    WBC, UA Too Numerous to Count (A) None Seen, 0-2, 3-5 /HPF    Bacteria, UA 3+ (A) None Seen /HPF    Squamous Epithelial Cells, UA Unable to determine due to loaded field (A) None Seen, 0-2 /HPF    Hyaline Casts, UA Unable to determine due to loaded field None Seen /LPF    " Triple Phosphate Crystals, UA Small/1+ None Seen /HPF    Methodology Manual Light Microscopy    Urinalysis With Microscopic If Indicated (No Culture) - Urine, Catheter    Specimen: Urine, Catheter   Result Value Ref Range    Color, UA Orange (A) Yellow, Straw, Dark Yellow, Kimberlee    Appearance, UA Turbid (A) Clear    pH, UA 8.0 5.0 - 9.0    Specific Gravity, UA 1.018 1.003 - 1.030    Glucose, UA Negative Negative    Ketones, UA Negative Negative    Bilirubin, UA Small (1+) (A) Negative    Blood, UA Large (3+) (A) Negative    Protein, UA >=300 mg/dL (3+) (A) Negative    Leuk Esterase, UA Large (3+) (A) Negative    Nitrite, UA Negative Negative    Urobilinogen, UA 0.2 E.U./dL 0.2 - 1.0 E.U./dL   CBC Auto Differential    Specimen: Blood   Result Value Ref Range    WBC 13.31 (H) 3.40 - 10.80 10*3/mm3    RBC 3.93 (L) 4.14 - 5.80 10*6/mm3    Hemoglobin 10.5 (L) 13.0 - 17.7 g/dL    Hematocrit 32.5 (L) 37.5 - 51.0 %    MCV 82.7 79.0 - 97.0 fL    MCH 26.7 26.6 - 33.0 pg    MCHC 32.3 31.5 - 35.7 g/dL    RDW 14.4 12.3 - 15.4 %    RDW-SD 43.5 37.0 - 54.0 fl    MPV 9.5 6.0 - 12.0 fL    Platelets 273 140 - 450 10*3/mm3    Neutrophil % 78.5 (H) 42.7 - 76.0 %    Lymphocyte % 8.6 (L) 19.6 - 45.3 %    Monocyte % 8.5 5.0 - 12.0 %    Eosinophil % 1.2 0.3 - 6.2 %    Basophil % 0.4 0.0 - 1.5 %    Immature Grans % 2.8 (H) 0.0 - 0.5 %    Neutrophils, Absolute 10.45 (H) 1.70 - 7.00 10*3/mm3    Lymphocytes, Absolute 1.15 0.70 - 3.10 10*3/mm3    Monocytes, Absolute 1.13 (H) 0.10 - 0.90 10*3/mm3    Eosinophils, Absolute 0.16 0.00 - 0.40 10*3/mm3    Basophils, Absolute 0.05 0.00 - 0.20 10*3/mm3    Immature Grans, Absolute 0.37 (H) 0.00 - 0.05 10*3/mm3    nRBC 0.0 0.0 - 0.2 /100 WBC   CBC Auto Differential    Specimen: Blood   Result Value Ref Range    WBC 11.54 (H) 3.40 - 10.80 10*3/mm3    RBC 3.55 (L) 4.14 - 5.80 10*6/mm3    Hemoglobin 10.0 (L) 13.0 - 17.7 g/dL    Hematocrit 30.0 (L) 37.5 - 51.0 %    MCV 84.5 79.0 - 97.0 fL    MCH 28.2 26.6 -  33.0 pg    MCHC 33.3 31.5 - 35.7 g/dL    RDW 14.2 12.3 - 15.4 %    RDW-SD 44.0 37.0 - 54.0 fl    MPV 9.4 6.0 - 12.0 fL    Platelets 256 140 - 450 10*3/mm3    Neutrophil % 77.0 (H) 42.7 - 76.0 %    Lymphocyte % 10.4 (L) 19.6 - 45.3 %    Monocyte % 8.4 5.0 - 12.0 %    Eosinophil % 1.5 0.3 - 6.2 %    Basophil % 0.4 0.0 - 1.5 %    Immature Grans % 2.3 (H) 0.0 - 0.5 %    Neutrophils, Absolute 8.88 (H) 1.70 - 7.00 10*3/mm3    Lymphocytes, Absolute 1.20 0.70 - 3.10 10*3/mm3    Monocytes, Absolute 0.97 (H) 0.10 - 0.90 10*3/mm3    Eosinophils, Absolute 0.17 0.00 - 0.40 10*3/mm3    Basophils, Absolute 0.05 0.00 - 0.20 10*3/mm3    Immature Grans, Absolute 0.27 (H) 0.00 - 0.05 10*3/mm3    nRBC 0.0 0.0 - 0.2 /100 WBC   CBC Auto Differential    Specimen: Blood   Result Value Ref Range    WBC 10.27 3.40 - 10.80 10*3/mm3    RBC 3.56 (L) 4.14 - 5.80 10*6/mm3    Hemoglobin 9.9 (L) 13.0 - 17.7 g/dL    Hematocrit 30.1 (L) 37.5 - 51.0 %    MCV 84.6 79.0 - 97.0 fL    MCH 27.8 26.6 - 33.0 pg    MCHC 32.9 31.5 - 35.7 g/dL    RDW 14.0 12.3 - 15.4 %    RDW-SD 43.6 37.0 - 54.0 fl    MPV 9.8 6.0 - 12.0 fL    Platelets 237 140 - 450 10*3/mm3    Neutrophil % 84.2 (H) 42.7 - 76.0 %    Lymphocyte % 7.1 (L) 19.6 - 45.3 %    Monocyte % 5.6 5.0 - 12.0 %    Eosinophil % 1.4 0.3 - 6.2 %    Basophil % 0.3 0.0 - 1.5 %    Immature Grans % 1.4 (H) 0.0 - 0.5 %    Neutrophils, Absolute 8.66 (H) 1.70 - 7.00 10*3/mm3    Lymphocytes, Absolute 0.73 0.70 - 3.10 10*3/mm3    Monocytes, Absolute 0.57 0.10 - 0.90 10*3/mm3    Eosinophils, Absolute 0.14 0.00 - 0.40 10*3/mm3    Basophils, Absolute 0.03 0.00 - 0.20 10*3/mm3    Immature Grans, Absolute 0.14 (H) 0.00 - 0.05 10*3/mm3    nRBC 0.0 0.0 - 0.2 /100 WBC     *Note: Due to a large number of results and/or encounters for the requested time period, some results have not been displayed. A complete set of results can be found in Results Review.         This document has been electronically signed by Karen Kaye  MD on July 11, 2022 07:56 CDT

## 2022-07-11 NOTE — HOSPICE
recieved pt sitting up in his wheel chair he is alert and verbally responsive. pt report pain to his left side that he described as chronic more intense pain than other times.  pt report pain usually relieved when he takes his pain meds.  he has a peg tube intact upper left abd no drainage or redness noted pt only uses PEG tube at night time.24fr  Hester cath intact with drainage bag near 150ml yellow urine noted in drainage bag.  Staff denies any new changes noted in pt condition.  pt finger nails and toenails were trimmed for comfort tolerated procedure well.  instructed staff to call hospice for any other changes in condition

## 2022-07-12 ENCOUNTER — HOME CARE VISIT (OUTPATIENT)
Dept: HOME HEALTH SERVICES | Facility: CLINIC | Age: 72
End: 2022-07-12

## 2022-07-12 PROCEDURE — 6520001 HSPC ROUTINE CARE

## 2022-07-13 ENCOUNTER — HOME CARE VISIT (OUTPATIENT)
Dept: HOME HEALTH SERVICES | Facility: CLINIC | Age: 72
End: 2022-07-13

## 2022-07-13 ENCOUNTER — TELEPHONE (OUTPATIENT)
Dept: GENERAL RADIOLOGY | Facility: HOSPITAL | Age: 72
End: 2022-07-13

## 2022-07-13 PROCEDURE — 6520001 HSPC ROUTINE CARE

## 2022-07-14 PROCEDURE — 6520001 HSPC ROUTINE CARE

## 2022-07-15 PROCEDURE — 6520001 HSPC ROUTINE CARE

## 2022-07-16 PROCEDURE — 6520001 HSPC ROUTINE CARE

## 2022-07-17 PROCEDURE — 6520001 HSPC ROUTINE CARE

## 2022-07-18 ENCOUNTER — HOME CARE VISIT (OUTPATIENT)
Dept: HOSPICE | Facility: HOSPICE | Age: 72
End: 2022-07-18

## 2022-07-18 PROCEDURE — 6520001 HSPC ROUTINE CARE

## 2022-07-18 NOTE — CASE COMMUNICATION
Phone call to patient's daughter, Ruthann to follow up on whether she had any further questions in terms of decision making to continue with Hospice Care. Ruthann said that she was able to gather some information from Hospice staff but forgot who she spoke to. She said that her plan is to have a face to face conversation with her father to further address options and what he wants. Due to patient testing Positive for Covid-19 on Monday of l ast week she has not visited with him. Ruthann said that she learned that patient could have limited PT and hoped that he would want to continue with intervention. Ruthann verbalized how to contact Signee if she had any questions and that I would see patient this month and would report any concerns to her.

## 2022-07-19 PROCEDURE — 6520001 HSPC ROUTINE CARE

## 2022-07-20 PROCEDURE — 6520001 HSPC ROUTINE CARE

## 2022-07-21 ENCOUNTER — OUTSIDE FACILITY SERVICE (OUTPATIENT)
Dept: FAMILY MEDICINE CLINIC | Facility: CLINIC | Age: 72
End: 2022-07-21

## 2022-07-21 ENCOUNTER — HOME CARE VISIT (OUTPATIENT)
Dept: HOSPICE | Facility: HOSPICE | Age: 72
End: 2022-07-21

## 2022-07-21 VITALS
SYSTOLIC BLOOD PRESSURE: 102 MMHG | RESPIRATION RATE: 19 BRPM | OXYGEN SATURATION: 94 % | TEMPERATURE: 99.5 F | DIASTOLIC BLOOD PRESSURE: 70 MMHG | HEART RATE: 112 BPM

## 2022-07-21 PROCEDURE — 6520001 HSPC ROUTINE CARE

## 2022-07-21 PROCEDURE — 99308 SBSQ NF CARE LOW MDM 20: CPT | Performed by: FAMILY MEDICINE

## 2022-07-22 PROCEDURE — 6520001 HSPC ROUTINE CARE

## 2022-07-23 PROCEDURE — 6520001 HSPC ROUTINE CARE

## 2022-07-24 PROCEDURE — 6520001 HSPC ROUTINE CARE

## 2022-07-25 PROCEDURE — 6520001 HSPC ROUTINE CARE

## 2022-07-26 ENCOUNTER — HOME CARE VISIT (OUTPATIENT)
Dept: HOME HEALTH SERVICES | Facility: CLINIC | Age: 72
End: 2022-07-26

## 2022-07-26 VITALS
HEART RATE: 110 BPM | OXYGEN SATURATION: 92 % | DIASTOLIC BLOOD PRESSURE: 60 MMHG | TEMPERATURE: 98 F | RESPIRATION RATE: 20 BRPM | SYSTOLIC BLOOD PRESSURE: 90 MMHG

## 2022-07-26 PROCEDURE — 6520001 HSPC ROUTINE CARE

## 2022-07-26 PROCEDURE — G0299 HHS/HOSPICE OF RN EA 15 MIN: HCPCS

## 2022-07-27 PROCEDURE — 6520001 HSPC ROUTINE CARE

## 2022-07-28 PROCEDURE — 6520001 HSPC ROUTINE CARE

## 2022-07-29 ENCOUNTER — HOME CARE VISIT (OUTPATIENT)
Dept: HOME HEALTH SERVICES | Facility: CLINIC | Age: 72
End: 2022-07-29

## 2022-07-29 ENCOUNTER — HOME CARE VISIT (OUTPATIENT)
Dept: HOSPICE | Facility: HOSPICE | Age: 72
End: 2022-07-29

## 2022-07-29 PROCEDURE — 6520001 HSPC ROUTINE CARE

## 2022-07-30 PROCEDURE — 6520001 HSPC ROUTINE CARE

## 2022-07-31 PROCEDURE — 6520001 HSPC ROUTINE CARE

## 2022-08-01 PROCEDURE — 6520001 HSPC ROUTINE CARE

## 2022-08-02 ENCOUNTER — HOME CARE VISIT (OUTPATIENT)
Dept: HOME HEALTH SERVICES | Facility: CLINIC | Age: 72
End: 2022-08-02

## 2022-08-02 VITALS
SYSTOLIC BLOOD PRESSURE: 110 MMHG | RESPIRATION RATE: 18 BRPM | OXYGEN SATURATION: 93 % | TEMPERATURE: 97.7 F | HEART RATE: 110 BPM | DIASTOLIC BLOOD PRESSURE: 70 MMHG

## 2022-08-02 PROCEDURE — G0299 HHS/HOSPICE OF RN EA 15 MIN: HCPCS

## 2022-08-02 PROCEDURE — 6520001 HSPC ROUTINE CARE

## 2022-08-02 NOTE — HOSPICE
Receieved pt laying in bed with eyes open he is alert and verbally responsive speech and low tone of voice noted.  Pt cleared of covid no longer testing positive his lung sounds are cta bialterally pt report occasional cough non productive.  His appitite is poor however has improved some since recovering from covid.  He gets peg tube feedings at night x 8 hours and eat meals in daytime.  He require total care for all adl's due to left side paralysis and generalized weakness.  He has #24 clifton intact with drainage bag at bedside with clear yellow urine require total care for all adl's 6/6 bathing dressing bowel bladder transfers and meal set up pt can fed himself.  pt report pain as 0/10 while lying still his pain increases to left side during adl's care.  emotional support provided instructed staff to call hospice for any acute changes in conditon or questions

## 2022-08-03 PROCEDURE — 6520001 HSPC ROUTINE CARE

## 2022-08-04 PROCEDURE — 6520001 HSPC ROUTINE CARE

## 2022-08-05 PROCEDURE — 6520001 HSPC ROUTINE CARE

## 2022-08-06 PROCEDURE — 6520001 HSPC ROUTINE CARE

## 2022-08-07 PROCEDURE — 6520001 HSPC ROUTINE CARE

## 2022-08-08 PROCEDURE — 6520001 HSPC ROUTINE CARE

## 2022-08-09 ENCOUNTER — APPOINTMENT (OUTPATIENT)
Dept: CT IMAGING | Facility: HOSPITAL | Age: 72
End: 2022-08-09

## 2022-08-09 ENCOUNTER — HOME CARE VISIT (OUTPATIENT)
Dept: HOSPICE | Facility: HOSPICE | Age: 72
End: 2022-08-09

## 2022-08-09 ENCOUNTER — HOSPITAL ENCOUNTER (INPATIENT)
Facility: HOSPITAL | Age: 72
LOS: 7 days | Discharge: NURSING, REHAB OR LONG-TERM CARE | End: 2022-08-16
Attending: FAMILY MEDICINE | Admitting: STUDENT IN AN ORGANIZED HEALTH CARE EDUCATION/TRAINING PROGRAM

## 2022-08-09 ENCOUNTER — APPOINTMENT (OUTPATIENT)
Dept: GENERAL RADIOLOGY | Facility: HOSPITAL | Age: 72
End: 2022-08-09

## 2022-08-09 ENCOUNTER — APPOINTMENT (OUTPATIENT)
Dept: INTERVENTIONAL RADIOLOGY/VASCULAR | Facility: HOSPITAL | Age: 72
End: 2022-08-09

## 2022-08-09 ENCOUNTER — APPOINTMENT (OUTPATIENT)
Dept: ULTRASOUND IMAGING | Facility: HOSPITAL | Age: 72
End: 2022-08-09

## 2022-08-09 DIAGNOSIS — R31.9 URINARY TRACT INFECTION WITH HEMATURIA, SITE UNSPECIFIED: ICD-10-CM

## 2022-08-09 DIAGNOSIS — N17.9 SEPSIS WITH ACUTE RENAL FAILURE WITHOUT SEPTIC SHOCK, DUE TO UNSPECIFIED ORGANISM, UNSPECIFIED ACUTE RENAL FAILURE TYPE: Primary | ICD-10-CM

## 2022-08-09 DIAGNOSIS — N17.9 ACUTE RENAL FAILURE, UNSPECIFIED ACUTE RENAL FAILURE TYPE: ICD-10-CM

## 2022-08-09 DIAGNOSIS — N39.0 URINARY TRACT INFECTION WITH HEMATURIA, SITE UNSPECIFIED: ICD-10-CM

## 2022-08-09 DIAGNOSIS — E87.5 HYPERKALEMIA: ICD-10-CM

## 2022-08-09 DIAGNOSIS — R13.12 OROPHARYNGEAL DYSPHAGIA: ICD-10-CM

## 2022-08-09 DIAGNOSIS — I95.9 HYPOTENSION, UNSPECIFIED HYPOTENSION TYPE: ICD-10-CM

## 2022-08-09 DIAGNOSIS — R65.20 SEPSIS WITH ACUTE RENAL FAILURE WITHOUT SEPTIC SHOCK, DUE TO UNSPECIFIED ORGANISM, UNSPECIFIED ACUTE RENAL FAILURE TYPE: Primary | ICD-10-CM

## 2022-08-09 DIAGNOSIS — A41.9 SEPSIS WITH ACUTE RENAL FAILURE WITHOUT SEPTIC SHOCK, DUE TO UNSPECIFIED ORGANISM, UNSPECIFIED ACUTE RENAL FAILURE TYPE: Primary | ICD-10-CM

## 2022-08-09 DIAGNOSIS — R33.9 URINARY RETENTION: ICD-10-CM

## 2022-08-09 LAB
ABO GROUP BLD: NORMAL
ALBUMIN SERPL-MCNC: 4.1 G/DL (ref 3.5–5.2)
ALBUMIN/GLOB SERPL: 1 G/DL
ALP SERPL-CCNC: 139 U/L (ref 39–117)
ALT SERPL W P-5'-P-CCNC: 40 U/L (ref 1–41)
ANION GAP SERPL CALCULATED.3IONS-SCNC: 14 MMOL/L (ref 5–15)
ANION GAP SERPL CALCULATED.3IONS-SCNC: 23 MMOL/L (ref 5–15)
ANISOCYTOSIS BLD QL: ABNORMAL
AST SERPL-CCNC: 30 U/L (ref 1–40)
BACTERIA UR QL AUTO: ABNORMAL /HPF
BASOPHILS # BLD AUTO: 0.15 10*3/MM3 (ref 0–0.2)
BASOPHILS NFR BLD AUTO: 0.5 % (ref 0–1.5)
BILIRUB SERPL-MCNC: 0.7 MG/DL (ref 0–1.2)
BILIRUB UR QL STRIP: NEGATIVE
BLD GP AB SCN SERPL QL: NEGATIVE
BUN SERPL-MCNC: 72 MG/DL (ref 8–23)
BUN SERPL-MCNC: 78 MG/DL (ref 8–23)
BUN/CREAT SERPL: 22.5 (ref 7–25)
BUN/CREAT SERPL: 28.2 (ref 7–25)
CALCIUM SPEC-SCNC: 10 MG/DL (ref 8.6–10.5)
CALCIUM SPEC-SCNC: 8.2 MG/DL (ref 8.6–10.5)
CHLORIDE SERPL-SCNC: 105 MMOL/L (ref 98–107)
CHLORIDE SERPL-SCNC: 96 MMOL/L (ref 98–107)
CK SERPL-CCNC: 47 U/L (ref 20–200)
CLARITY UR: ABNORMAL
CO2 SERPL-SCNC: 19 MMOL/L (ref 22–29)
CO2 SERPL-SCNC: 20 MMOL/L (ref 22–29)
COLOR UR: ABNORMAL
CREAT SERPL-MCNC: 2.55 MG/DL (ref 0.76–1.27)
CREAT SERPL-MCNC: 3.46 MG/DL (ref 0.76–1.27)
D-LACTATE SERPL-SCNC: 2.5 MMOL/L (ref 0.5–2)
D-LACTATE SERPL-SCNC: 3 MMOL/L (ref 0.5–2)
D-LACTATE SERPL-SCNC: 5.2 MMOL/L (ref 0.5–2)
D-LACTATE SERPL-SCNC: 5.3 MMOL/L (ref 0.5–2)
DEPRECATED RDW RBC AUTO: 45.4 FL (ref 37–54)
EGFRCR SERPLBLD CKD-EPI 2021: 18 ML/MIN/1.73
EGFRCR SERPLBLD CKD-EPI 2021: 26 ML/MIN/1.73
EOSINOPHIL # BLD AUTO: 0.08 10*3/MM3 (ref 0–0.4)
EOSINOPHIL NFR BLD AUTO: 0.3 % (ref 0.3–6.2)
ERYTHROCYTE [DISTWIDTH] IN BLOOD BY AUTOMATED COUNT: 16.4 % (ref 12.3–15.4)
FLUAV SUBTYP SPEC NAA+PROBE: NOT DETECTED
FLUBV RNA ISLT QL NAA+PROBE: NOT DETECTED
GLOBULIN UR ELPH-MCNC: 4.1 GM/DL
GLUCOSE SERPL-MCNC: 197 MG/DL (ref 65–99)
GLUCOSE SERPL-MCNC: 210 MG/DL (ref 65–99)
GLUCOSE UR STRIP-MCNC: NEGATIVE MG/DL
HCT VFR BLD AUTO: 43.7 % (ref 37.5–51)
HGB BLD-MCNC: 14.4 G/DL (ref 13–17.7)
HGB UR QL STRIP.AUTO: ABNORMAL
HOLD SPECIMEN: NORMAL
HYALINE CASTS UR QL AUTO: ABNORMAL /LPF
IMM GRANULOCYTES # BLD AUTO: 0.99 10*3/MM3 (ref 0–0.05)
IMM GRANULOCYTES NFR BLD AUTO: 3.2 % (ref 0–0.5)
INR PPP: 1.3 (ref 0.8–1.2)
KETONES UR QL STRIP: NEGATIVE
LARGE PLATELETS: ABNORMAL
LEUKOCYTE ESTERASE UR QL STRIP.AUTO: ABNORMAL
LYMPHOCYTES # BLD AUTO: 0.94 10*3/MM3 (ref 0.7–3.1)
LYMPHOCYTES # BLD MANUAL: 2.19 10*3/MM3 (ref 0.7–3.1)
LYMPHOCYTES NFR BLD AUTO: 3 % (ref 19.6–45.3)
LYMPHOCYTES NFR BLD MANUAL: 2 % (ref 5–12)
Lab: NORMAL
MAGNESIUM SERPL-MCNC: 2.7 MG/DL (ref 1.6–2.4)
MCH RBC QN AUTO: 26 PG (ref 26.6–33)
MCHC RBC AUTO-ENTMCNC: 33 G/DL (ref 31.5–35.7)
MCV RBC AUTO: 78.9 FL (ref 79–97)
MONOCYTES # BLD AUTO: 1.32 10*3/MM3 (ref 0.1–0.9)
MONOCYTES # BLD: 0.63 10*3/MM3 (ref 0.1–0.9)
MONOCYTES NFR BLD AUTO: 4.2 % (ref 5–12)
NEUTROPHILS # BLD AUTO: 28.51 10*3/MM3 (ref 1.7–7)
NEUTROPHILS NFR BLD AUTO: 27.85 10*3/MM3 (ref 1.7–7)
NEUTROPHILS NFR BLD AUTO: 88.8 % (ref 42.7–76)
NEUTROPHILS NFR BLD MANUAL: 89 % (ref 42.7–76)
NEUTS BAND NFR BLD MANUAL: 2 % (ref 0–5)
NEUTS VAC BLD QL SMEAR: ABNORMAL
NITRITE UR QL STRIP: NEGATIVE
NRBC BLD AUTO-RTO: 0 /100 WBC (ref 0–0.2)
NT-PROBNP SERPL-MCNC: ABNORMAL PG/ML (ref 0–900)
PH UR STRIP.AUTO: 8 [PH] (ref 5–9)
PLATELET # BLD AUTO: 424 10*3/MM3 (ref 140–450)
PMV BLD AUTO: 9.6 FL (ref 6–12)
POTASSIUM SERPL-SCNC: 5.3 MMOL/L (ref 3.5–5.2)
POTASSIUM SERPL-SCNC: 5.9 MMOL/L (ref 3.5–5.2)
POTASSIUM SERPL-SCNC: 6.4 MMOL/L (ref 3.5–5.2)
PROCALCITONIN SERPL-MCNC: 54.88 NG/ML (ref 0–0.25)
PROT SERPL-MCNC: 8.2 G/DL (ref 6–8.5)
PROT UR QL STRIP: ABNORMAL
PROTHROMBIN TIME: 16 SECONDS (ref 11.1–15.3)
QT INTERVAL: 276 MS
QTC INTERVAL: 419 MS
RBC # BLD AUTO: 5.54 10*6/MM3 (ref 4.14–5.8)
RBC # UR STRIP: ABNORMAL /HPF
REF LAB TEST METHOD: ABNORMAL
RH BLD: POSITIVE
SARS-COV-2 RNA PNL SPEC NAA+PROBE: DETECTED
SMALL PLATELETS BLD QL SMEAR: ADEQUATE
SODIUM SERPL-SCNC: 138 MMOL/L (ref 136–145)
SODIUM SERPL-SCNC: 139 MMOL/L (ref 136–145)
SODIUM UR-SCNC: 133 MMOL/L
SP GR UR STRIP: 1.02 (ref 1–1.03)
SQUAMOUS #/AREA URNS HPF: ABNORMAL /HPF
T&S EXPIRATION DATE: NORMAL
TOXIC GRANULATION: ABNORMAL
TROPONIN T SERPL-MCNC: 0.12 NG/ML (ref 0–0.03)
TROPONIN T SERPL-MCNC: 0.19 NG/ML (ref 0–0.03)
UROBILINOGEN UR QL STRIP: ABNORMAL
VARIANT LYMPHS NFR BLD MANUAL: 7 % (ref 19.6–45.3)
WBC # UR STRIP: ABNORMAL /HPF
WBC NRBC COR # BLD: 31.33 10*3/MM3 (ref 3.4–10.8)
WHOLE BLOOD HOLD COAG: NORMAL
WHOLE BLOOD HOLD SPECIMEN: NORMAL
WHOLE BLOOD HOLD SPECIMEN: NORMAL

## 2022-08-09 PROCEDURE — 86900 BLOOD TYPING SEROLOGIC ABO: CPT

## 2022-08-09 PROCEDURE — 83605 ASSAY OF LACTIC ACID: CPT | Performed by: FAMILY MEDICINE

## 2022-08-09 PROCEDURE — 87636 SARSCOV2 & INF A&B AMP PRB: CPT | Performed by: FAMILY MEDICINE

## 2022-08-09 PROCEDURE — 80053 COMPREHEN METABOLIC PANEL: CPT | Performed by: NURSE PRACTITIONER

## 2022-08-09 PROCEDURE — C1751 CATH, INF, PER/CENT/MIDLINE: HCPCS

## 2022-08-09 PROCEDURE — 70450 CT HEAD/BRAIN W/O DYE: CPT

## 2022-08-09 PROCEDURE — 87077 CULTURE AEROBIC IDENTIFY: CPT | Performed by: FAMILY MEDICINE

## 2022-08-09 PROCEDURE — 82550 ASSAY OF CK (CPK): CPT | Performed by: FAMILY MEDICINE

## 2022-08-09 PROCEDURE — 85610 PROTHROMBIN TIME: CPT

## 2022-08-09 PROCEDURE — 02HV33Z INSERTION OF INFUSION DEVICE INTO SUPERIOR VENA CAVA, PERCUTANEOUS APPROACH: ICD-10-PCS | Performed by: STUDENT IN AN ORGANIZED HEALTH CARE EDUCATION/TRAINING PROGRAM

## 2022-08-09 PROCEDURE — 87150 DNA/RNA AMPLIFIED PROBE: CPT | Performed by: FAMILY MEDICINE

## 2022-08-09 PROCEDURE — 83735 ASSAY OF MAGNESIUM: CPT | Performed by: FAMILY MEDICINE

## 2022-08-09 PROCEDURE — 84145 PROCALCITONIN (PCT): CPT | Performed by: STUDENT IN AN ORGANIZED HEALTH CARE EDUCATION/TRAINING PROGRAM

## 2022-08-09 PROCEDURE — 93005 ELECTROCARDIOGRAM TRACING: CPT

## 2022-08-09 PROCEDURE — 74176 CT ABD & PELVIS W/O CONTRAST: CPT

## 2022-08-09 PROCEDURE — 85025 COMPLETE CBC W/AUTO DIFF WBC: CPT

## 2022-08-09 PROCEDURE — 93010 ELECTROCARDIOGRAM REPORT: CPT | Performed by: INTERNAL MEDICINE

## 2022-08-09 PROCEDURE — 84132 ASSAY OF SERUM POTASSIUM: CPT | Performed by: FAMILY MEDICINE

## 2022-08-09 PROCEDURE — 36415 COLL VENOUS BLD VENIPUNCTURE: CPT | Performed by: FAMILY MEDICINE

## 2022-08-09 PROCEDURE — 86850 RBC ANTIBODY SCREEN: CPT

## 2022-08-09 PROCEDURE — 84156 ASSAY OF PROTEIN URINE: CPT | Performed by: NURSE PRACTITIONER

## 2022-08-09 PROCEDURE — 87040 BLOOD CULTURE FOR BACTERIA: CPT | Performed by: FAMILY MEDICINE

## 2022-08-09 PROCEDURE — 25010000002 CEFTRIAXONE PER 250 MG: Performed by: FAMILY MEDICINE

## 2022-08-09 PROCEDURE — 87186 SC STD MICRODIL/AGAR DIL: CPT | Performed by: FAMILY MEDICINE

## 2022-08-09 PROCEDURE — 83880 ASSAY OF NATRIURETIC PEPTIDE: CPT | Performed by: FAMILY MEDICINE

## 2022-08-09 PROCEDURE — 99285 EMERGENCY DEPT VISIT HI MDM: CPT

## 2022-08-09 PROCEDURE — 85007 BL SMEAR W/DIFF WBC COUNT: CPT

## 2022-08-09 PROCEDURE — 84300 ASSAY OF URINE SODIUM: CPT | Performed by: NURSE PRACTITIONER

## 2022-08-09 PROCEDURE — 87086 URINE CULTURE/COLONY COUNT: CPT | Performed by: FAMILY MEDICINE

## 2022-08-09 PROCEDURE — 6520001 HSPC ROUTINE CARE

## 2022-08-09 PROCEDURE — 25010000002 HEPARIN (PORCINE) PER 1000 UNITS: Performed by: STUDENT IN AN ORGANIZED HEALTH CARE EDUCATION/TRAINING PROGRAM

## 2022-08-09 PROCEDURE — 82570 ASSAY OF URINE CREATININE: CPT | Performed by: NURSE PRACTITIONER

## 2022-08-09 PROCEDURE — 81001 URINALYSIS AUTO W/SCOPE: CPT | Performed by: FAMILY MEDICINE

## 2022-08-09 PROCEDURE — 86901 BLOOD TYPING SEROLOGIC RH(D): CPT

## 2022-08-09 PROCEDURE — 84484 ASSAY OF TROPONIN QUANT: CPT | Performed by: FAMILY MEDICINE

## 2022-08-09 PROCEDURE — 71045 X-RAY EXAM CHEST 1 VIEW: CPT

## 2022-08-09 RX ORDER — SODIUM CHLORIDE 0.9 % (FLUSH) 0.9 %
10 SYRINGE (ML) INJECTION AS NEEDED
Status: DISCONTINUED | OUTPATIENT
Start: 2022-08-09 | End: 2022-08-16 | Stop reason: HOSPADM

## 2022-08-09 RX ORDER — SODIUM CHLORIDE 9 MG/ML
125 INJECTION, SOLUTION INTRAVENOUS CONTINUOUS
Status: DISCONTINUED | OUTPATIENT
Start: 2022-08-09 | End: 2022-08-09

## 2022-08-09 RX ORDER — SODIUM CHLORIDE 0.9 % (FLUSH) 0.9 %
10 SYRINGE (ML) INJECTION EVERY 12 HOURS SCHEDULED
Status: DISCONTINUED | OUTPATIENT
Start: 2022-08-09 | End: 2022-08-16 | Stop reason: HOSPADM

## 2022-08-09 RX ORDER — HEPARIN SODIUM 5000 [USP'U]/ML
5000 INJECTION, SOLUTION INTRAVENOUS; SUBCUTANEOUS EVERY 8 HOURS SCHEDULED
Status: DISCONTINUED | OUTPATIENT
Start: 2022-08-09 | End: 2022-08-16 | Stop reason: HOSPADM

## 2022-08-09 RX ORDER — PANTOPRAZOLE SODIUM 40 MG/10ML
40 INJECTION, POWDER, LYOPHILIZED, FOR SOLUTION INTRAVENOUS
Status: DISCONTINUED | OUTPATIENT
Start: 2022-08-09 | End: 2022-08-16 | Stop reason: HOSPADM

## 2022-08-09 RX ORDER — METOCLOPRAMIDE 5 MG/1
7.5 TABLET ORAL 4 TIMES DAILY
Status: DISCONTINUED | OUTPATIENT
Start: 2022-08-09 | End: 2022-08-09 | Stop reason: DRUGHIGH

## 2022-08-09 RX ORDER — BACLOFEN 10 MG/1
10 TABLET ORAL EVERY 8 HOURS PRN
Status: DISCONTINUED | OUTPATIENT
Start: 2022-08-09 | End: 2022-08-16 | Stop reason: HOSPADM

## 2022-08-09 RX ORDER — FLUOXETINE HYDROCHLORIDE 20 MG/1
20 CAPSULE ORAL DAILY
Status: DISCONTINUED | OUTPATIENT
Start: 2022-08-10 | End: 2022-08-16 | Stop reason: HOSPADM

## 2022-08-09 RX ORDER — ALBUTEROL SULFATE 90 UG/1
2 AEROSOL, METERED RESPIRATORY (INHALATION) EVERY 6 HOURS PRN
Status: DISCONTINUED | OUTPATIENT
Start: 2022-08-09 | End: 2022-08-16 | Stop reason: HOSPADM

## 2022-08-09 RX ORDER — NOREPINEPHRINE BIT/0.9 % NACL 8 MG/250ML
.02-.3 INFUSION BOTTLE (ML) INTRAVENOUS
Status: DISCONTINUED | OUTPATIENT
Start: 2022-08-09 | End: 2022-08-15

## 2022-08-09 RX ORDER — ONDANSETRON 2 MG/ML
4 INJECTION INTRAMUSCULAR; INTRAVENOUS EVERY 6 HOURS PRN
Status: DISCONTINUED | OUTPATIENT
Start: 2022-08-09 | End: 2022-08-16 | Stop reason: HOSPADM

## 2022-08-09 RX ORDER — METOCLOPRAMIDE 5 MG/1
5 TABLET ORAL
Status: DISCONTINUED | OUTPATIENT
Start: 2022-08-09 | End: 2022-08-16 | Stop reason: HOSPADM

## 2022-08-09 RX ORDER — HYDROCODONE BITARTRATE AND ACETAMINOPHEN 5; 325 MG/1; MG/1
1 TABLET ORAL EVERY 6 HOURS PRN
Status: DISCONTINUED | OUTPATIENT
Start: 2022-08-09 | End: 2022-08-16 | Stop reason: HOSPADM

## 2022-08-09 RX ADMIN — SODIUM CHLORIDE 2217 ML: 9 INJECTION, SOLUTION INTRAVENOUS at 12:02

## 2022-08-09 RX ADMIN — SODIUM CHLORIDE 125 ML/HR: 9 INJECTION, SOLUTION INTRAVENOUS at 12:55

## 2022-08-09 RX ADMIN — SODIUM CHLORIDE 1000 ML: 9 INJECTION, SOLUTION INTRAVENOUS at 11:40

## 2022-08-09 RX ADMIN — CEFTRIAXONE SODIUM 2 G: 2 INJECTION, POWDER, FOR SOLUTION INTRAMUSCULAR; INTRAVENOUS at 12:23

## 2022-08-09 RX ADMIN — Medication 10 ML: at 21:02

## 2022-08-09 RX ADMIN — HEPARIN SODIUM 5000 UNITS: 5000 INJECTION INTRAVENOUS; SUBCUTANEOUS at 21:01

## 2022-08-09 RX ADMIN — SODIUM BICARBONATE 150 MEQ: 84 INJECTION, SOLUTION INTRAVENOUS at 15:01

## 2022-08-09 RX ADMIN — HEPARIN SODIUM 5000 UNITS: 5000 INJECTION INTRAVENOUS; SUBCUTANEOUS at 15:02

## 2022-08-09 RX ADMIN — NOREPINEPHRINE BITARTRATE 0.02 MCG/KG/MIN: 1 INJECTION, SOLUTION, CONCENTRATE INTRAVENOUS at 14:32

## 2022-08-09 NOTE — PROGRESS NOTES
TWO PATIENT IDENTIFIERS WERE USED. CONSENT WAS SIGNED PER MD (Deemed emergent per Dr. Armstrong) EDUCATION MATERIAL WAS GIVEN TO PATIENT AND / OR FAMILY. THE PATIENT WAS DRAPED WITH FULL BODY DRAPE AND PATIENT'S RIGHT ARM WAS PREPPED WITH CHLORAPREP.  ULTRASOUND WAS USED TO LOCALIZE THERIGHT BASILIC  VEIN. SUBCUTANEOUS TISSUE AT THE CATHETER SITE WAS INFILTRATED WITH 2% LIDOCAINE. UNDER ULTRASOUND GUIDANCE, THE VEIN WAS ACCESSED WITH A 21GAUGE  NEEDLE. AN 0.018 WIRE WAS THEN THREADED THROUGH THE NEEDLE INTO THE CENTRAL VENOUS SYSTEM. THE 21GAUGE  NEEDLE WAS REMOVED AND A 5 [place]ian PEEL AWAY SHEATH WAS PLACED OVER THE WIRE. THE PICC LINE CATHETER WAS CUT AT 42 CM. THE PICC LINE CATHETER WAS THEN PLACED OVER THE WIRE INTO THE VEIN, THE SHEATH WAS PEELED AWAY,WIRE WAS REMOVED. CATHETER WAS FLUSHED WITH NORMAL SALINE AND TIPS APPLIED. BIOPATCH PLACED. CATHETER SECURED WITH STATLOCK AND TEGADERM. PATIENT TOLERATED PROCEDURE WELL. THIS WAS DONE IN THE   ER      IMPRESSION: SUCCESSFUL PLACEMENT OF TRIPLE LUMEN SOLO PICC        Mary Park RN  8/9/2022  14:17 CDT

## 2022-08-09 NOTE — H&P
[facility] Admission      Date of Admission: 8/9/2022      Primary Care Physician: Estrada Fuentes MD      Chief Complaint: confusion    HPI:    He is a 72 year old from SNF where he resided after CVA. He was placed on hospice at the end of June due to lack of improvement. Today, hospice was revoked to come to the ED for treatment of confusion and concern for another stroke. History is obtained by chart review and conversations with his daughter. She states he had a recent covid infection but had been doing well as of recently.     In the ED, he was found to be hypotensive, tachycardic with WBC of 33 along with renal failure and hyperkalemia. We were asked to admit.     Concurrent Medical History:  has a past medical history of Stroke (HCC).    Past Surgical History:  has a past surgical history that includes Peg Tube Insertion (Left); Esophagogastroduodenoscopy (N/A, 6/15/2022); and Colonoscopy (N/A, 6/15/2022).    Family History: family history is not on file. Unable to obtain    Social History:  reports that he has never smoked. He has never used smokeless tobacco. He reports that he does not drink alcohol and does not use drugs.    Allergies: No Known Allergies    Medications:   Prior to Admission medications    Medication Sig Start Date End Date Taking? Authorizing Provider   albuterol (PROVENTIL) (2.5 MG/3ML) 0.083% nebulizer solution Take 2.5 mg by nebulization Every 6 (Six) Hours As Needed for Shortness of Air or Wheezing. 7/21/22   Estrada Fuentes MD   baclofen (LIORESAL) 10 MG tablet Take 1 tablet by mouth Every 8 (Eight) Hours As Needed for Muscle Spasms. 6/28/22   Michelle Kohler MD   docusate sodium (COLACE) 100 MG capsule Take 1 capsule by mouth 2 (Two) Times a Day. 6/28/22   Michelle Kohler MD   FLUoxetine (PROzac) 20 MG capsule Take 1 capsule by mouth Daily. 6/28/22   Michelle Kohler MD   gabapentin (NEURONTIN) 100 MG capsule Take 2  capsules by mouth 3 (Three) Times a Day. 6/28/22   Michelle Kohler MD   HYDROcodone-acetaminophen (NORCO) 5-325 MG per tablet Take 1 tablet by mouth Every 6 (Six) Hours As Needed for Moderate Pain . 6/28/22   Michelle Kohler MD   lactulose 20 GM/30ML solution solution Take 30 mL by mouth Daily As Needed (constipation). 6/28/22   Michelle Kohler MD   levoFLOXacin (LEVAQUIN) 500 MG tablet Take 500 mg by mouth Daily. 7/21/22   Estrada Fuentes MD   magnesium hydroxide (Milk of Magnesia) 400 MG/5ML suspension Take 30 mL by mouth Daily As Needed for Constipation. 6/28/22   Michelle Kohler MD   Melatonin 10 MG tablet Take 1 tablet by mouth Every Night. 6/28/22   Michelle Kohler MD   metoclopramide (REGLAN) 10 MG tablet Take 1 tablet by mouth 4 (Four) Times a Day. 6/28/22   Michelle Kohler MD   nicotine (Nicotine Step 3) 7 MG/24HR patch Place 1 patch on the skin as directed by provider Daily. 6/28/22   Michelle Kohler MD   Omeprazole 20 MG tablet delayed-release Take 1 capsule by mouth Daily. 6/28/22   Michelle Kohler MD   polyethylene glycol (MiraLax) 17 GM/SCOOP powder Take 17 g by mouth Daily. 6/28/22   Michelle Kohler MD   rosuvastatin (CRESTOR) 20 MG tablet Take 40 mg by mouth Every Night.  6/28/22  Provider, MD Erica       Review of Systems:  Review of Systems   Unable to perform ROS: Acuity of condition      Otherwise complete ROS is negative except as mentioned above.    Physical Exam:   Temp:  [98.6 °F (37 °C)] 98.6 °F (37 °C)  Heart Rate:  [129-140] 131  Resp:  [14-24] 22  BP: (77-94)/(52-66) 77/58  Physical Exam  Vitals reviewed.   Constitutional:       General: He is not in acute distress.     Appearance: He is well-developed. He is ill-appearing.   HENT:      Head: Normocephalic and atraumatic.      Nose: Nose normal.   Eyes:      Conjunctiva/sclera: Conjunctivae normal.   Cardiovascular:      Rate and Rhythm: Normal rate and regular rhythm.   Pulmonary:      Effort: Pulmonary  effort is normal. No respiratory distress.      Breath sounds: Normal breath sounds. No wheezing or rales.   Musculoskeletal:         General: Normal range of motion.      Cervical back: Normal range of motion and neck supple.   Skin:     General: Skin is dry.           Results Reviewed:  I have personally reviewed current lab, radiology, and data and agree with results.  Lab Results (last 24 hours)     Procedure Component Value Units Date/Time    Sodium, Urine, Random - Urine, Clean Catch [[id number]] Collected: 08/09/22 1142    Specimen: Urine, Clean Catch Updated: 08/09/22 1614     Sodium, Urine 133 mmol/L     Narrative:      Reference intervals for random urine have not been established.  Clinical usage is dependent upon physician's interpretation in combination with other laboratory tests.       Creatinine, Urine, Random - Urine, Clean Catch [[id number]] Collected: 08/09/22 1142    Specimen: Urine, Clean Catch Updated: 08/09/22 1608    Protein / Creatinine Ratio, Urine - Urine, Clean Catch [[id number]] Collected: 08/09/22 1142    Specimen: Urine, Clean Catch Updated: 08/09/22 1608    Extra Tubes [[id number]] Collected: 08/09/22 1130    Specimen: Blood, Venous Line Updated: 08/09/22 1533    Narrative:      The following orders were created for panel order Extra Tubes.  Procedure                               Abnormality         Status                     ---------                               -----------         ------                     Felix Top[[id number]]                                         Final result                 Please view results for these tests on the individual orders.    Felix Top [[id number]] Collected: 08/09/22 1130    Specimen: Blood Updated: 08/09/22 1533     Extra Tube Hold for add-ons.     Comment: Auto resulted.       Procalcitonin [[id number]]  (Abnormal) Collected: 08/09/22 1257    Specimen: Blood Updated: 08/09/22 1529     Procalcitonin 54.88 ng/mL     Narrative:      As a Marker for  "Sepsis (Non-Neonates):    1. <0.5 ng/mL represents a low risk of severe sepsis and/or septic shock.  2. >2 ng/mL represents a high risk of severe sepsis and/or septic shock.    As a Marker for Lower Respiratory Tract Infections that require antibiotic therapy:    PCT on Admission    Antibiotic Therapy       6-12 Hrs later    >0.5                Strongly Recommended  >0.25 - <0.5        Recommended   0.1 - 0.25          Discouraged              Remeasure/reassess PCT  <0.1                Strongly Discouraged     Remeasure/reassess PCT    As 28 day mortality risk marker: \"Change in Procalcitonin Result\" (>80% or <=80%) if Day 0 (or Day 1) and Day 4 values are available. Refer to http://www.[facility]ynthelispct-calculator.com    Change in PCT <=80%  A decrease of PCT levels below or equal to 80% defines a positive change in PCT test result representing a higher risk for 28-day all-cause mortality of patients diagnosed with severe sepsis for septic shock.    Change in PCT >80%  A decrease of PCT levels of more than 80% defines a negative change in PCT result representing a lower risk for 28-day all-cause mortality of patients diagnosed with severe sepsis or septic shock.       STAT Lactic Acid, Reflex [[id number]]  (Abnormal) Collected: 08/09/22 1415    Specimen: Blood Updated: 08/09/22 1443     Lactate 5.2 mmol/L     Extra Tubes [[id number]] Collected: 08/09/22 1257    Specimen: Blood, Venous Line Updated: 08/09/22 1403    Narrative:      The following orders were created for panel order Extra Tubes.  Procedure                               Abnormality         Status                     ---------                               -----------         ------                     Lavender Top[[id number]]                                     Final result               [facility][[id number]]                                   Final result                 Please view results for these tests on the individual orders.    Lavender Top " [[id number]] Collected: 08/09/22 1257    Specimen: Blood Updated: 08/09/22 1403     Extra Tube hold for add-on     Comment: Auto resulted       Gold Top - SST [[id number]] Collected: 08/09/22 1257    Specimen: Blood Updated: 08/09/22 1403     Extra Tube Hold for add-ons.     Comment: Auto resulted.       Troponin [[id number]]  (Abnormal) Collected: 08/09/22 1257    Specimen: Blood Updated: 08/09/22 1337     Troponin T 0.120 ng/mL     Narrative:      Troponin T Reference Range:  <= 0.03 ng/mL-   Negative for AMI  >0.03 ng/mL-     Abnormal for myocardial necrosis.  Clinicians would have to utilize clinical acumen, EKG, Troponin and serial changes to determine if it is an Acute Myocardial Infarction or myocardial injury due to an underlying chronic condition.       Results may be falsely decreased if patient taking Biotin.      Potassium [[id number]]  (Abnormal) Collected: 08/09/22 1257    Specimen: Blood from Arm, Left Updated: 08/09/22 1329     Potassium 5.9 mmol/L      Comment: Slight hemolysis detected by analyzer. Results may be affected.       Troponin [[id number]]  (Abnormal) Collected: 08/09/22 1130    Specimen: Blood Updated: 08/09/22 1311     Troponin T 0.189 ng/mL     Narrative:      Troponin T Reference Range:  <= 0.03 ng/mL-   Negative for AMI  >0.03 ng/mL-     Abnormal for myocardial necrosis.  Clinicians would have to utilize clinical acumen, EKG, Troponin and serial changes to determine if it is an Acute Myocardial Infarction or myocardial injury due to an underlying chronic condition.       Results may be falsely decreased if patient taking Biotin.      CK [[id number]]  (Normal) Collected: 08/09/22 1130    Specimen: Blood Updated: 08/09/22 1310     Creatine Kinase 47 U/L     Magnesium [[id number]]  (Abnormal) Collected: 08/09/22 1130    Specimen: Blood Updated: 08/09/22 1310     Magnesium 2.7 mg/dL     BNP [[id number]]  (Abnormal) Collected: 08/09/22 1130    Specimen: Blood Updated: 08/09/22 1309     proBNP  29,409.0 pg/mL     Narrative:      Among patients with dyspnea, NT-proBNP is highly sensitive for the detection of acute congestive heart failure. In addition NT-proBNP of <300 pg/ml effectively rules out acute congestive heart failure with 99% negative predictive value.    Results may be falsely decreased if patient taking Biotin.      Blood Culture - Blood, Arm, Left [[id number]] Collected: 08/09/22 1257    Specimen: Blood from Arm, Left Updated: 08/09/22 1302    Lactic Acid, Plasma [[id number]]  (Abnormal) Collected: 08/09/22 1130    Specimen: Blood Updated: 08/09/22 1236     Lactate 5.3 mmol/L     Urinalysis, Microscopic Only - Urine, Clean Catch [[id number]]  (Abnormal) Collected: 08/09/22 1142    Specimen: Urine, Clean Catch Updated: 08/09/22 1235     RBC, UA 3-5 /HPF      WBC, UA Too Numerous to Count /HPF      Bacteria, UA 4+ /HPF      Squamous Epithelial Cells, UA None Seen /HPF      Hyaline Casts, UA None Seen /LPF      Methodology Manual Light Microscopy    Urine Culture - Urine, Urine, Clean Catch [[id number]] Collected: 08/09/22 1142    Specimen: Urine, Clean Catch Updated: 08/09/22 1235    [place] Draw [[id number]] Collected: 08/09/22 1129    Specimen: Blood Updated: 08/09/22 1233    Narrative:      The following orders were created for panel order [place] Draw.  Procedure                               Abnormality         Status                     ---------                               -----------         ------                     Green Top (Gel)[[id number]]                                  Final result               Lavender Top[[id number]]                                     Final result               Gold Top - SST[[id number]]                                   Final result               Light Blue Top[[id number]]                                   Final result                 Please view results for these tests on the individual orders.    Green Top (Gel) [[id number]] Collected: 08/09/22 1130    Specimen:  Blood Updated: 08/09/22 1233     Extra Tube Hold for add-ons.     Comment: Auto resulted.       Lavender Top [[id number]] Collected: 08/09/22 1130    Specimen: Blood Updated: 08/09/22 1233     Extra Tube hold for add-on     Comment: Auto resulted       Gold Top - SST [[id number]] Collected: 08/09/22 1129    Specimen: Blood Updated: 08/09/22 1233     Extra Tube Hold for add-ons.     Comment: Auto resulted.       Light Blue Top [[id number]] Collected: 08/09/22 1129    Specimen: Blood Updated: 08/09/22 1233     Extra Tube Hold for add-ons.     Comment: Auto resulted       Comprehensive Metabolic Panel [[id number]]  (Abnormal) Collected: 08/09/22 1130    Specimen: Blood Updated: 08/09/22 1228     Glucose 210 mg/dL      BUN 78 mg/dL      Creatinine 3.46 mg/dL      Sodium 138 mmol/L      Potassium 6.4 mmol/L      Chloride 96 mmol/L      CO2 19.0 mmol/L      Calcium 10.0 mg/dL      Total Protein 8.2 g/dL      Albumin 4.10 g/dL      ALT (SGPT) 40 U/L      AST (SGOT) 30 U/L      Alkaline Phosphatase 139 U/L      Total Bilirubin 0.7 mg/dL      Globulin 4.1 gm/dL      A/G Ratio 1.0 g/dL      BUN/Creatinine Ratio 22.5     Anion Gap 23.0 mmol/L      eGFR 18.0 mL/min/1.73      Comment: National Kidney Foundation and American Society of Nephrology (ASN) Task Force recommended calculation based on the Chronic Kidney Disease Epidemiology Collaboration (CKD-EPI) equation refit without adjustment for race.       Narrative:      GFR Normal >60  Chronic Kidney Disease <60  Kidney Failure <15      Manual Differential [[id number]]  (Abnormal) Collected: 08/09/22 1130    Specimen: Blood Updated: 08/09/22 1225     Neutrophil % 89.0 %      Lymphocyte % 7.0 %      Monocyte % 2.0 %      Bands %  2.0 %      Neutrophils Absolute 28.51 10*3/mm3      Lymphocytes Absolute 2.19 10*3/mm3      Monocytes Absolute 0.63 10*3/mm3      Anisocytosis Slight/1+     Toxic Granulation Slight/1+     Vacuolated Neutrophils Slight/1+     Platelet Estimate Adequate      Large Platelets Slight/1+    COVID-19 and FLU A/B PCR - Swab, Nasopharynx [[id number]]  (Abnormal) Collected: 08/09/22 1156    Specimen: Swab from Nasopharynx Updated: 08/09/22 1225     COVID19 Detected     Comment: Enhanced Precautions Requested          Influenza A PCR Not Detected     Influenza B PCR Not Detected    Narrative:      Fact sheet for providers: https://www.fda.gov/media/556838/download    Fact sheet for patients: https://www.fda.gov/media/782979/download    Test performed by PCR.  Influenza A and Influenza B negative results should be considered presumptive in samples that have a positive SARS-CoV-2 result.    Competitive inhibition studies showed that SARS-CoV-2 virus, when present at concentrations above 3.6E+04 copies/mL, can inhibit the detection and amplification of influenza A and influenza B virus RNA if present at or below 1.8E+02 copies/mL or 4.9E+02 copies/mL, respectively, and may lead to false negative influenza virus results. If co-infection with influenza A or influenza B virus is suspected in samples with a positive SARS-CoV-2 result, the sample should be re-tested with another FDA cleared, approved, or authorized influenza test, if influenza virus detection would change clinical management.    Blood Culture - Blood, Hand, Right [[id number]] Collected: 08/09/22 1222    Specimen: Blood from Hand, Right Updated: 08/09/22 1223    Urinalysis With Culture If Indicated - Urine, Clean Catch [[id number]]  (Abnormal) Collected: 08/09/22 1142    Specimen: Urine, Clean Catch Updated: 08/09/22 1222     Color, UA Dark Yellow     Appearance, UA Turbid     pH, UA 8.0     Specific Gravity, UA 1.016     Glucose, UA Negative     Ketones, UA Negative     Bilirubin, UA Negative     Blood, UA Moderate (2+)     Protein, UA >=300 mg/dL (3+)     Leuk Esterase, UA Large (3+)     Nitrite, UA Negative     Urobilinogen, UA 0.2 E.U./dL    Narrative:      In absence of clinical symptoms, the presence of pyuria,  bacteria, and/or nitrites on the urinalysis result does not correlate with infection.    Protime-INR [[id number]]  (Abnormal) Collected: 08/09/22 1129    Specimen: Blood Updated: 08/09/22 1150     Protime 16.0 Seconds      INR 1.30    Narrative:      Therapeutic range for most indications is 2.0-3.0 INR,  or 2.5-3.5 for mechanical heart valves.    CBC & Differential [[id number]]  (Abnormal) Collected: 08/09/22 1130    Specimen: Blood Updated: 08/09/22 1142    Narrative:      The following orders were created for panel order CBC & Differential.  Procedure                               Abnormality         Status                     ---------                               -----------         ------                     CBC Auto Differential[[id number]]        Abnormal            Final result               Scan Slide[[id number]]                                                                    Please view results for these tests on the individual orders.    CBC Auto Differential [[id number]]  (Abnormal) Collected: 08/09/22 1130    Specimen: Blood Updated: 08/09/22 1141     WBC 31.33 10*3/mm3      RBC 5.54 10*6/mm3      Hemoglobin 14.4 g/dL      Hematocrit 43.7 %      MCV 78.9 fL      MCH 26.0 pg      MCHC 33.0 g/dL      RDW 16.4 %      RDW-SD 45.4 fl      MPV 9.6 fL      Platelets 424 10*3/mm3      Neutrophil % 88.8 %      Lymphocyte % 3.0 %      Monocyte % 4.2 %      Eosinophil % 0.3 %      Basophil % 0.5 %      Immature Grans % 3.2 %      Neutrophils, Absolute 27.85 10*3/mm3      Lymphocytes, Absolute 0.94 10*3/mm3      Monocytes, Absolute 1.32 10*3/mm3      Eosinophils, Absolute 0.08 10*3/mm3      Basophils, Absolute 0.15 10*3/mm3      Immature Grans, Absolute 0.99 10*3/mm3      nRBC 0.0 /100 WBC         Imaging Results (Last 24 Hours)     Procedure Component Value Units Date/Time    CT Abdomen Pelvis Without Contrast [[id number]] Collected: 08/09/22 1523     Updated: 08/09/22 1541    Narrative:      EXAM:  CT ABDOMEN  PELVIS WITHOUT IV CONTRAST    ORDERING PROVIDER:  VESTA BEEBE    CLINICAL HISTORY:  Sepsis, abscess    COMPARISON:      TECHNIQUE:   CT abdomen and pelvis performed without IV or oral contrast,  reformatted in the sagittal and coronal planes.     This examination was performed according to our departmental dose  optimization program which includes automated exposure control,  adjustment of the MA and kV according to patient size, and/or use  of iterative reconstruction technique.    FINDINGS:    BASILAR CHEST: Mild to moderate left pleural effusion. Mild  consolidation in the lung bases on the left side and to a lesser  extent on the right.    LIVER: No mass, enlargement or abnormal density.    BILIARY TRACT: Unremarkable gallbladder.    SPLEEN: No mass or enlargement.    PANCREAS: No mass or inflammatory process. Normal pancreatic duct    ADRENAL GLANDS: Unremarkable. No mass.    URINARY SYSTEM: Kidneys are normal in size. No obstructing stone,  or gross mass. Bilateral mild hydroureteronephrosis concerning  for chronic urinary bladder outlet obstruction. Bladder is mildly  distended without mass or stone.      GI TRACT: G-tube in place with good position. No mass, dilation,  or wall thickening.  No diverticula.  No hernia.  Appendix is  normal.      REPRODUCTIVE SYSTEM: Unremarkable    PERITONEAL SPACE:No free air, free fluid, mass or adenopathy.    RETROPERITONEAL SPACE:  No adenopathy, mass, aneurysm or  significant vascular abnormality.     BONES AND EXTRA-ABDOMINAL SOFT TISSUES: No aggressive osseous  lesion..  No inguinal adenopathy or hernia.      Impression:      Mild to moderate left pleural effusion.   Mild consolidation in the lung bases on the left side and to a  lesser extent on the right.  Bilateral mild hydroureteronephrosis concerning for chronic  urinary bladder outlet obstruction.   The urinary bladder is distended.  Good position of the G-tube.  Appendix is normal.    Electronically  signed by:  Carlos Alberto Ospina MD  8/9/2022 3:39 PM CDT  Workstation: [id number]    XR Chest Post CVA Port [[id number]] Collected: 08/09/22 1411     Updated: 08/09/22 1430    Narrative:      EXAMINATION:  XR CHEST POST CVA PORT    CLINICAL HISTORY:  72 years Male,hypo, A41.9 Sepsis, unspecified  organism R65.20 Severe sepsis without septic shock N17.9 Acute  kidney failure, unspecified N17.9 Acute kidney failure,  unspecified E87.5 Hyperkalemia N39.0 Urinary tract infection,  site not specified R31.9 Hematuria, unspecified I95.9  Hypotension, unspecified    COMPARISON:  Chest x-ray dated 8/9/2022    FINDINGS:  Right PICC with tip in the mid SVC, new since earlier  today. No pneumothorax. Small left pleural effusion with adjacent  atelectasis/consolidation. Normal heart size. Evidence of a prior  granulomas process.      Impression:      New right PICC with tip in the SVC. No pneumothorax.    Electronically signed by:  Santos Meredith MD  8/9/2022 2:27 PM CDT  Workstation: [id number]    US Guidance PICC NC [[id number]] Resulted: 08/09/22 1421     Updated: 08/09/22 1421    Narrative:      This procedure was auto-finalized with no dictation required.    IR PICC W Imaging Guidance [[id number]] Resulted: 08/09/22 1418     Updated: 08/09/22 1418    Narrative:      This procedure was auto-finalized with no dictation required.    XR Chest 1 View [[id number]] Collected: 08/09/22 1054     Updated: 08/09/22 1142    Narrative:      EXAMINATION:  XR CHEST 1 VW    CLINICAL HISTORY:  72 years Male,Stroke Protocol (Onset > 12 hrs)    COMPARISON:  None    FINDINGS:  Small left pleural effusion with adjacent likely  atelectasis or possibly consolidation. No pneumothorax. Normal  heart size and pulmonary vascularity. Evidence of a prior  granulomatous process.      Impression:      Small left pleural effusion with adjacent  atelectasis or possibly consolidation.    Electronically signed by:  Santos Meredith MD  8/9/2022 11:40 AM CDT  Workstation:  [id number]    CT Head Without Contrast Stroke Protocol [[id number]] Collected: 08/09/22 1053     Updated: 08/09/22 1122    Narrative:      PROCEDURE: CT HEAD WO CONTRAST STROKE    INDICATION:  CVA one month ago, follow-up    COMPARISON:  None    TECHNIQUE:  CT head, without iv contrast.       This exam was performed according to our departmental  dose-optimization program, which includes automated exposure  control, adjustment of the mA and/or kV according to patient size  and/or use of iterative reconstruction technique.  DLP is 892.5    FINDINGS:    The degree of cerebral and cerebellar atrophy is within normal  limits for age.    There is preservation of the gray-white matter differentiation.     Old infarcts in both basal ganglia are present, as well as a  larger areas of nonacute infarction/encephalomalacia in the right  frontoparietal region.    There are extensive age related degenerative cortical and deep  white matter changes.    There is no evidence of a hemorrhage or intracranial mass.    There is no midline shift.    The ventricles are normal in size and configuration.    The brain stem, globes, paranasal sinuses, and osseous structures  are within normal limits.        Impression:      Generalized cerebral and cerebellar atrophy. No acute  intracranial abnormality, but there are areas of nonacute  infarction/encephalomalacia in the bilateral basal ganglia, as  well as the right frontal and parietal lobes.  If pain or symptoms persist beyond reasonable expectations, an  MRI examination is suggested as is deemed clinically appropriate.    Electronically signed by:  Eugenie Vera MD  8/9/2022 11:20 AM CDT  Workstation: [id number]            Assessment:    Active Hospital Problems    Diagnosis    • Sepsis with acute renal failure without septic shock, due to unspecified organism, unspecified acute renal failure type (HCC)              Plan:    Septic shock  -IV antibiotics  -IVF  -IV pressors  -CXR  reviewed  -ordered CT abdomen     Acute Renal Failure  -nephrology consulted  -f/u imaging and urine studies    Metabolic acidosis  -bicarb drip    Prognosis is guarded.    Family decided to transition to DNR/DNI.      I confirmed that the patient's Advance Care Plan is present, code status is documented, or surrogate decision maker is listed in the patient's medical record.     I have utilized all available immediate resources to obtain, update, or review the patient's current medications.       I discussed the patients findings and my recommendations with: ER provider, nephrology, RN, family       This document has been electronically signed by Yadi Knutson MD on August 9, 2022 16:34 CDT

## 2022-08-09 NOTE — ED NOTES
Contacted infection control at this time, state they will call [facility] to confirm covid + dx two weeks ago.

## 2022-08-09 NOTE — ED PROVIDER NOTES
Subjective   Patient presents to the emergency department at his daughter's request from the nursing home.  He was initially placed in a nursing home this past April after having a large stroke that affected his left side.  Patient was recently placed on hospice, and the hospice was revoked in the emergency department by his daughter today.  She states that this morning he became confused and began leaning towards his left side like he did with his past stroke and she is concerned that he may be having another stroke.  Upon arrival to the emergency department patient is tachycardic, tachypneic, confused, and hypotensive.  Daughter states that he is normally able to talk and joke around and today he will answer simple questions by moving his head in response to yes or no questions, but is unable to speak on his own.      Altered Mental Status  Presenting symptoms: behavior changes, confusion and lethargy    Severity:  Moderate  Most recent episode:  Today  Duration:  1 day  Timing:  Constant  Progression:  Unchanged  Chronicity:  New      Review of Systems   Unable to perform ROS: Acuity of condition   Psychiatric/Behavioral: Positive for confusion.       Past Medical History:   Diagnosis Date   • Stroke (HCC)        No Known Allergies    Past Surgical History:   Procedure Laterality Date   • COLONOSCOPY N/A 6/15/2022    Procedure: COLONOSCOPY;  Surgeon: Karen Kaye MD;  Location: [facility] ENDOSCOPY;  Service: Gastroenterology;  Laterality: N/A;   • ENDOSCOPY N/A 6/15/2022    Procedure: ESOPHAGOGASTRODUODENOSCOPY;  Surgeon: Karen Kaye MD;  Location: [facility] ENDOSCOPY;  Service: Gastroenterology;  Laterality: N/A;   • PEG TUBE INSERTION Left        History reviewed. No pertinent family history.    Social History     Socioeconomic History   • Marital status:    Tobacco Use   • Smoking status: Never Smoker   • Smokeless tobacco: Never Used   Vaping Use   • Vaping Use: Never used   Substance and  Sexual Activity   • Alcohol use: Never   • Drug use: Never   • Sexual activity: Not Currently           Objective   Physical Exam  Vitals and nursing note reviewed.   Constitutional:       Appearance: He is well-developed. He is ill-appearing.   HENT:      Head: Normocephalic and atraumatic.      Nose: Nose normal.   Eyes:      General: No scleral icterus.        Right eye: No discharge.         Left eye: No discharge.      Conjunctiva/sclera: Conjunctivae normal.      Pupils: Pupils are equal, round, and reactive to light.   Neck:      Trachea: No tracheal deviation.   Cardiovascular:      Rate and Rhythm: Regular rhythm. Tachycardia present.      Heart sounds: Normal heart sounds. No murmur heard.  Pulmonary:      Effort: Pulmonary effort is normal. Tachypnea present. No respiratory distress.      Breath sounds: Normal breath sounds. No stridor. No wheezing or rales.   Abdominal:      General: Bowel sounds are normal. There is no distension.      Palpations: Abdomen is soft. There is no mass.      Tenderness: There is no abdominal tenderness. There is no guarding or rebound.   Musculoskeletal:      Cervical back: Normal range of motion and neck supple.   Skin:     General: Skin is warm and dry.      Findings: No erythema or rash.   Neurological:      Mental Status: He is lethargic and confused.      GCS: GCS eye subscore is 4. GCS verbal subscore is 4. GCS motor subscore is 5.      Coordination: Coordination normal.   Psychiatric:         Attention and Perception: He is inattentive.         Speech: He is noncommunicative.         Procedures           ED Course              Labs Reviewed   COVID-19 AND FLU A/B, NP SWAB IN TRANSPORT MEDIA 8-12 HR TAT - Abnormal; Notable for the following components:       Result Value    COVID19 Detected (*)     All other components within normal limits    Narrative:     Fact sheet for providers: https://www.fda.gov/media/700411/download    Fact sheet for patients:  https://www.fda.gov/media/154395/download    Test performed by PCR.  Influenza A and Influenza B negative results should be considered presumptive in samples that have a positive SARS-CoV-2 result.    Competitive inhibition studies showed that SARS-CoV-2 virus, when present at concentrations above 3.6E+04 copies/mL, can inhibit the detection and amplification of influenza A and influenza B virus RNA if present at or below 1.8E+02 copies/mL or 4.9E+02 copies/mL, respectively, and may lead to false negative influenza virus results. If co-infection with influenza A or influenza B virus is suspected in samples with a positive SARS-CoV-2 result, the sample should be re-tested with another FDA cleared, approved, or authorized influenza test, if influenza virus detection would change clinical management.   COMPREHENSIVE METABOLIC PANEL - Abnormal; Notable for the following components:    Glucose 210 (*)     BUN 78 (*)     Creatinine 3.46 (*)     Potassium 6.4 (*)     Chloride 96 (*)     CO2 19.0 (*)     Alkaline Phosphatase 139 (*)     Anion Gap 23.0 (*)     eGFR 18.0 (*)     All other components within normal limits    Narrative:     GFR Normal >60  Chronic Kidney Disease <60  Kidney Failure <15     PROTIME-INR - Abnormal; Notable for the following components:    Protime 16.0 (*)     INR 1.30 (*)     All other components within normal limits    Narrative:     Therapeutic range for most indications is 2.0-3.0 INR,  or 2.5-3.5 for mechanical heart valves.   CBC WITH AUTO DIFFERENTIAL - Abnormal; Notable for the following components:    WBC 31.33 (*)     MCV 78.9 (*)     MCH 26.0 (*)     RDW 16.4 (*)     Neutrophil % 88.8 (*)     Lymphocyte % 3.0 (*)     Monocyte % 4.2 (*)     Immature Grans % 3.2 (*)     Neutrophils, Absolute 27.85 (*)     Monocytes, Absolute 1.32 (*)     Immature Grans, Absolute 0.99 (*)     All other components within normal limits   BNP (IN-HOUSE) - Abnormal; Notable for the following components:     proBNP 29,409.0 (*)     All other components within normal limits    Narrative:     Among patients with dyspnea, NT-proBNP is highly sensitive for the detection of acute congestive heart failure. In addition NT-proBNP of <300 pg/ml effectively rules out acute congestive heart failure with 99% negative predictive value.    Results may be falsely decreased if patient taking Biotin.     TROPONIN (IN-HOUSE) - Abnormal; Notable for the following components:    Troponin T 0.189 (*)     All other components within normal limits    Narrative:     Troponin T Reference Range:  <= 0.03 ng/mL-   Negative for AMI  >0.03 ng/mL-     Abnormal for myocardial necrosis.  Clinicians would have to utilize clinical acumen, EKG, Troponin and serial changes to determine if it is an Acute Myocardial Infarction or myocardial injury due to an underlying chronic condition.       Results may be falsely decreased if patient taking Biotin.     TROPONIN (IN-HOUSE) - Abnormal; Notable for the following components:    Troponin T 0.120 (*)     All other components within normal limits    Narrative:     Troponin T Reference Range:  <= 0.03 ng/mL-   Negative for AMI  >0.03 ng/mL-     Abnormal for myocardial necrosis.  Clinicians would have to utilize clinical acumen, EKG, Troponin and serial changes to determine if it is an Acute Myocardial Infarction or myocardial injury due to an underlying chronic condition.       Results may be falsely decreased if patient taking Biotin.     LACTIC ACID, PLASMA - Abnormal; Notable for the following components:    Lactate 5.3 (*)     All other components within normal limits   MAGNESIUM - Abnormal; Notable for the following components:    Magnesium 2.7 (*)     All other components within normal limits   URINALYSIS W/ CULTURE IF INDICATED - Abnormal; Notable for the following components:    Appearance, UA Turbid (*)     Blood, UA Moderate (2+) (*)     Protein, UA >=300 mg/dL (3+) (*)     Leuk Esterase, UA Large (3+)  (*)     All other components within normal limits    Narrative:     In absence of clinical symptoms, the presence of pyuria, bacteria, and/or nitrites on the urinalysis result does not correlate with infection.   MANUAL DIFFERENTIAL - Abnormal; Notable for the following components:    Neutrophil % 89.0 (*)     Lymphocyte % 7.0 (*)     Monocyte % 2.0 (*)     Neutrophils Absolute 28.51 (*)     All other components within normal limits   URINALYSIS, MICROSCOPIC ONLY - Abnormal; Notable for the following components:    RBC, UA 3-5 (*)     WBC, UA Too Numerous to Count (*)     Bacteria, UA 4+ (*)     All other components within normal limits   POTASSIUM - Abnormal; Notable for the following components:    Potassium 5.9 (*)     All other components within normal limits   CK - Normal   BLOOD CULTURE   BLOOD CULTURE   URINE CULTURE   RAINBOW DRAW    Narrative:     The following orders were created for panel order [place] Draw.  Procedure                               Abnormality         Status                     ---------                               -----------         ------                     Green Top (Gel)[[id number]]                                  Final result               Lavender Top[[id number]]                                     Final result               Gold Top - SST[[id number]]                                   Final result               Light Blue Top[[id number]]                                   Final result                 Please view results for these tests on the individual orders.   LACTIC ACID, REFLEX   POCT GLUCOSE FINGERSTICK   TYPE AND SCREEN   PREVIOUS HISTORY   CBC AND DIFFERENTIAL    Narrative:     The following orders were created for panel order CBC & Differential.  Procedure                               Abnormality         Status                     ---------                               -----------         ------                     CBC Auto Differential[[id number]]        Abnormal             Final result               Scan Slide[[id number]]                                                                    Please view results for these tests on the individual orders.   GREEN TOP   LAVENDER TOP   GOLD TOP - SST   LIGHT BLUE TOP   EXTRA TUBES    Narrative:     The following orders were created for panel order Extra Tubes.  Procedure                               Abnormality         Status                     ---------                               -----------         ------                     Felix Top[[id number]]                                         In process                   Please view results for these tests on the individual orders.   GRAY TOP   EXTRA TUBES    Narrative:     The following orders were created for panel order Extra Tubes.  Procedure                               Abnormality         Status                     ---------                               -----------         ------                     Lavender Top[[id number]]                                     In process                 Gold Top - SST[[id number]]                                   In process                   Please view results for these tests on the individual orders.   LAVENDER TOP   GOLD TOP - SST       XR Chest 1 View   Final Result   Small left pleural effusion with adjacent   atelectasis or possibly consolidation.      Electronically signed by:  Santos Meredith MD  8/9/2022 11:40 AM CDT   Workstation: [id number]      CT Head Without Contrast Stroke Protocol   Final Result   Generalized cerebral and cerebellar atrophy. No acute   intracranial abnormality, but there are areas of nonacute   infarction/encephalomalacia in the bilateral basal ganglia, as   well as the right frontal and parietal lobes.   If pain or symptoms persist beyond reasonable expectations, an   MRI examination is suggested as is deemed clinically appropriate.      Electronically signed by:  Eugenie Vera MD  8/9/2022 11:20 AM CDT   Workstation:  [id number]      IR Insert Midline Without Port Pump 5 Plus    (Results Pending)   US Guidance PICC NC    (Results Pending)   IR PICC W Imaging Guidance    (Results Pending)   XR Chest Post CVA Port    (Results Pending)                                         MDM    Final diagnoses:   Sepsis with acute renal failure without septic shock, due to unspecified organism, unspecified acute renal failure type (HCC)   Acute renal failure, unspecified acute renal failure type (HCC)   Hyperkalemia   Urinary tract infection with hematuria, site unspecified   Hypotension, unspecified hypotension type       ED Disposition  ED Disposition     ED Disposition   Decision to Admit    Condition   --    Comment   Level of Care: Critical Care [6]   Diagnosis: Sepsis with acute renal failure without septic shock, due to unspecified organism, unspecified acute renal failure type (HCC) [[id number]]   Admitting Physician: VESTA BEEBE [[id number]]   Attending Physician: VESTA BEEBE [[id number]]   Certification: I Certify That Inpatient Hospital Services Are Medically Necessary For Greater Than 2 Midnights               No follow-up provider specified.       Medication List      No changes were made to your prescriptions during this visit.          James Armstrong MD  08/09/22 7831

## 2022-08-09 NOTE — ED NOTES
Lab unable to collect pt remaining labs d/t pt being difficult stick. State Flynn from lab will come attempt.

## 2022-08-09 NOTE — ED NOTES
Angio made aware of order for Midline, angio stated they were in cases and would be down as soon as they could, but to call if patient's condition changes. Pt's primary nurse notified.

## 2022-08-09 NOTE — CONSULTS
[facility]  [address] [place] [address]. [id number]  T - [phone]  F - [phone]     Consultation         PATIENT  DEMOGRAPHICS   PATIENT NAME: Fer Ivan                      PHYSICIAN: RASHAUN Gardiner  : 1950  MRN: [id number]    Subjective   SUBJECTIVE   Referring Provider: Dr. Knutson  Reason for Consultation: NELLIE/Hyperkalemia  History of present illness: This is a 72-year-old male with a past medical history significant for previous CVA who presented to the hospital today.  The patient had a CVA earlier this year and continues to have left-sided residual weakness.  He was placed on hospice a couple of months ago but hospice was revoked earlier this morning by his daughter as she was told by the hospital staff I believe he was having another stroke.  On evaluation in ER he is found to have significant acute kidney injury, hyperkalemia, elevated white blood cell count, and probable sepsis.  Nephrology is consulted to manage NELLIE and hyperkalemia.    Past Medical History:   Diagnosis Date    Stroke (HCC)      Past Surgical History:   Procedure Laterality Date    COLONOSCOPY N/A 6/15/2022    Procedure: COLONOSCOPY;  Surgeon: Karen Kaye MD;  Location: [facility] ENDOSCOPY;  Service: Gastroenterology;  Laterality: N/A;    ENDOSCOPY N/A 6/15/2022    Procedure: ESOPHAGOGASTRODUODENOSCOPY;  Surgeon: Karen Kaye MD;  Location: [facility] ENDOSCOPY;  Service: Gastroenterology;  Laterality: N/A;    PEG TUBE INSERTION Left      History reviewed. No pertinent family history.  Social History     Tobacco Use    Smoking status: Never Smoker    Smokeless tobacco: Never Used   Vaping Use    Vaping Use: Never used   Substance Use Topics    Alcohol use: Never    Drug use: Never     Allergies:  Patient has no known allergies.     REVIEW OF SYSTEMS    Review of Systems   Unable to perform ROS: Mental status change       Objective   OBJECTIVE   Vital Signs  Temp:  [98.6 °F (37 °C)] 98.6 °F (37  "°C)  Heart Rate:  [129-140] 129  Resp:  [14-24] 14  BP: (85-94)/(52-66) 85/52    Flowsheet Rows      Flowsheet Row First Filed Value   Admission Height 188 cm (74\") Documented at 08/09/2022 1045   Admission Weight 73.9 kg (163 lb) Documented at 08/09/2022 1045             No intake/output data recorded.    PHYSICAL EXAM    Physical Exam  Constitutional:       Appearance: He is well-developed. He is ill-appearing and toxic-appearing.   HENT:      Head: Normocephalic and atraumatic.   Eyes:      Conjunctiva/sclera: Conjunctivae normal.      Pupils: Pupils are equal, round, and reactive to light.   Cardiovascular:      Rate and Rhythm: Normal rate and regular rhythm.   Pulmonary:      Effort: Pulmonary effort is normal.      Breath sounds: Normal breath sounds.   Abdominal:      Palpations: Abdomen is soft.   Musculoskeletal:      Cervical back: Neck supple.      Right lower leg: Edema present.      Left lower leg: Edema present.   Skin:     General: Skin is warm and dry.      Coloration: Skin is pale.   Neurological:      Mental Status: He is lethargic and disoriented.   Psychiatric:         Mood and Affect: Mood normal.         Behavior: Behavior normal.         RESULTS   Results Review:    Results from last 7 days   Lab Units 08/09/22  1257 08/09/22  1130   SODIUM mmol/L  --  138   POTASSIUM mmol/L 5.9* 6.4*   CHLORIDE mmol/L  --  96*   CO2 mmol/L  --  19.0*   BUN mg/dL  --  78*   CREATININE mg/dL  --  3.46*   CALCIUM mg/dL  --  10.0   BILIRUBIN mg/dL  --  0.7   ALK PHOS U/L  --  139*   ALT (SGPT) U/L  --  40   AST (SGOT) U/L  --  30   GLUCOSE mg/dL  --  210*       Estimated Creatinine Clearance: 20.2 mL/min (A) (by C-G formula based on SCr of 3.46 mg/dL (H)).    Results from last 7 days   Lab Units 08/09/22  1130   MAGNESIUM mg/dL 2.7*             Results from last 7 days   Lab Units 08/09/22  1130   WBC 10*3/mm3 31.33*   HEMOGLOBIN g/dL 14.4   PLATELETS 10*3/mm3 424       Results from last 7 days   Lab Units " 08/09/22  1129   INR  1.30*        MEDICATIONS    [START ON 8/10/2022] cefTRIAXone, 2 g, Intravenous, Q24H  [START ON 8/10/2022] FLUoxetine, 20 mg, Oral, Daily  heparin (porcine), 5,000 Units, Subcutaneous, Q8H  metoclopramide, 7.5 mg, Oral, 4x Daily  pantoprazole, 40 mg, Intravenous, Q AM  sodium chloride, 10 mL, Intravenous, Q12H      norepinephrine, 0.02-0.3 mcg/kg/min  sodium chloride, 125 mL/hr, Last Rate: 125 mL/hr (08/09/22 1255)      (Not in a hospital admission)    Assessment & Plan   ASSESSMENT / PLAN      Sepsis with acute renal failure without septic shock, due to unspecified organism, unspecified acute renal failure type (HCC)    1.  NELLIE- Baseline creatinine less than 1, currently up to 3.46.  Likely has prerenal NELLIE secondary to sepsis and hypotension.  He has received sepsis fluid bolus, blood pressure remains low, will start Levophed as needed.  Add sodium bicarbonate drip.  Order urine studies and await CT of the abdomen.      -Discussed with the patient's daughter that his overall prognosis is poor.  She will discuss with her brother and consider making the patient DNR.    2.  Hyperkalemia- improving, bicarb drip as above    3.  Sepsis with septic shock- manage per primary team, possible urinary source    4.  Metabolic acidosis- bicarb drip as above    5.  UTI- manage per primary team    6.  Recent COVID      Thank you for the consult, we will continue to follow the patient.         I discussed the patients findings and my recommendations with patient, family, nursing staff and primary care team      This document has been electronically signed by RASHAUN Gardiner on August 9, 2022 14:21 CDT      For this patient encounter, I have reviewed the Nurse Practitioner's documentation, medical decision making, and treatment plan and personally spent time with the patient.

## 2022-08-09 NOTE — ED NOTES
This RN removed pt clifton at this time, large amount of blood and pus noted at this time. Per MD clifton not replaced. In and out cath preformed with approx 500 mL of output. Male purewick placed on pt.

## 2022-08-10 ENCOUNTER — APPOINTMENT (OUTPATIENT)
Dept: CARDIOLOGY | Facility: HOSPITAL | Age: 72
End: 2022-08-10

## 2022-08-10 LAB
ANION GAP SERPL CALCULATED.3IONS-SCNC: 11 MMOL/L (ref 5–15)
BACTERIA BLD CULT: ABNORMAL
BACTERIA ID TEST ISLT QL CULT: ABNORMAL
BASOPHILS # BLD AUTO: 0.05 10*3/MM3 (ref 0–0.2)
BASOPHILS NFR BLD AUTO: 0.2 % (ref 0–1.5)
BH CV ECHO LEFT VENTRICLE GLOBAL LONGITUDINAL STRAIN: -3.7 %
BH CV ECHO MEAS - ACS: 1.84 CM
BH CV ECHO MEAS - AO MAX PG: 3.4 MMHG
BH CV ECHO MEAS - AO MEAN PG: 2.21 MMHG
BH CV ECHO MEAS - AO ROOT DIAM: 3.5 CM
BH CV ECHO MEAS - AO V2 MAX: 91.5 CM/SEC
BH CV ECHO MEAS - AO V2 VTI: 15.5 CM
BH CV ECHO MEAS - AVA(I,D): 2.28 CM2
BH CV ECHO MEAS - EDV(CUBED): 116.8 ML
BH CV ECHO MEAS - EDV(MOD-SP2): 101 ML
BH CV ECHO MEAS - EDV(MOD-SP4): 131 ML
BH CV ECHO MEAS - EF(MOD-SP2): 36.4 %
BH CV ECHO MEAS - EF(MOD-SP4): 29.2 %
BH CV ECHO MEAS - ESV(CUBED): 49 ML
BH CV ECHO MEAS - ESV(MOD-SP2): 64.2 ML
BH CV ECHO MEAS - ESV(MOD-SP4): 92.7 ML
BH CV ECHO MEAS - FS: 25.1 %
BH CV ECHO MEAS - IVS/LVPW: 1.07 CM
BH CV ECHO MEAS - IVSD: 1.05 CM
BH CV ECHO MEAS - LA DIMENSION: 2.7 CM
BH CV ECHO MEAS - LAT PEAK E' VEL: 11 CM/SEC
BH CV ECHO MEAS - LV DIASTOLIC VOL/BSA (35-75): 67.6 CM2
BH CV ECHO MEAS - LV MASS(C)D: 177.8 GRAMS
BH CV ECHO MEAS - LV MAX PG: 1.34 MMHG
BH CV ECHO MEAS - LV MEAN PG: 0.73 MMHG
BH CV ECHO MEAS - LV SYSTOLIC VOL/BSA (12-30): 47.8 CM2
BH CV ECHO MEAS - LV V1 MAX: 57.8 CM/SEC
BH CV ECHO MEAS - LV V1 VTI: 11.6 CM
BH CV ECHO MEAS - LVIDD: 4.9 CM
BH CV ECHO MEAS - LVIDS: 3.7 CM
BH CV ECHO MEAS - LVOT AREA: 3.1 CM2
BH CV ECHO MEAS - LVOT DIAM: 1.97 CM
BH CV ECHO MEAS - LVPWD: 0.97 CM
BH CV ECHO MEAS - MED PEAK E' VEL: 11 CM/SEC
BH CV ECHO MEAS - MR MAX PG: 69.8 MMHG
BH CV ECHO MEAS - MR MAX VEL: 417.8 CM/SEC
BH CV ECHO MEAS - MV A MAX VEL: 100.4 CM/SEC
BH CV ECHO MEAS - MV DEC SLOPE: 668.2 CM/SEC2
BH CV ECHO MEAS - MV E MAX VEL: 63.7 CM/SEC
BH CV ECHO MEAS - MV E/A: 0.63
BH CV ECHO MEAS - MV MAX PG: 4.1 MMHG
BH CV ECHO MEAS - MV MEAN PG: 1.77 MMHG
BH CV ECHO MEAS - MV P1/2T: 45 MSEC
BH CV ECHO MEAS - MV V2 VTI: 20.4 CM
BH CV ECHO MEAS - MVA(P1/2T): 4.9 CM2
BH CV ECHO MEAS - MVA(VTI): 1.73 CM2
BH CV ECHO MEAS - PA V2 MAX: 116 CM/SEC
BH CV ECHO MEAS - RAP SYSTOLE: 3 MMHG
BH CV ECHO MEAS - RVSP: 23.2 MMHG
BH CV ECHO MEAS - SI(MOD-SP2): 19 ML/M2
BH CV ECHO MEAS - SI(MOD-SP4): 19.7 ML/M2
BH CV ECHO MEAS - SV(LVOT): 35.3 ML
BH CV ECHO MEAS - SV(MOD-SP2): 36.8 ML
BH CV ECHO MEAS - SV(MOD-SP4): 38.3 ML
BH CV ECHO MEAS - TAPSE (>1.6): 2.04 CM
BH CV ECHO MEAS - TR MAX PG: 20.2 MMHG
BH CV ECHO MEAS - TR MAX VEL: 224.9 CM/SEC
BH CV ECHO MEASUREMENTS AVERAGE E/E' RATIO: 5.79
BUN SERPL-MCNC: 68 MG/DL (ref 8–23)
BUN/CREAT SERPL: 36.4 (ref 7–25)
CALCIUM SPEC-SCNC: 8.3 MG/DL (ref 8.6–10.5)
CHLORIDE SERPL-SCNC: 104 MMOL/L (ref 98–107)
CO2 SERPL-SCNC: 25 MMOL/L (ref 22–29)
CREAT SERPL-MCNC: 1.87 MG/DL (ref 0.76–1.27)
CREAT UR-MCNC: 38.2 MG/DL
D-LACTATE SERPL-SCNC: 1.6 MMOL/L (ref 0.5–2)
DEPRECATED RDW RBC AUTO: 44.4 FL (ref 37–54)
EGFRCR SERPLBLD CKD-EPI 2021: 37.7 ML/MIN/1.73
EOSINOPHIL # BLD AUTO: 0 10*3/MM3 (ref 0–0.4)
EOSINOPHIL NFR BLD AUTO: 0 % (ref 0.3–6.2)
ERYTHROCYTE [DISTWIDTH] IN BLOOD BY AUTOMATED COUNT: 15.9 % (ref 12.3–15.4)
GLUCOSE SERPL-MCNC: 163 MG/DL (ref 65–99)
HCT VFR BLD AUTO: 30.4 % (ref 37.5–51)
HGB BLD-MCNC: 10.1 G/DL (ref 13–17.7)
IMM GRANULOCYTES # BLD AUTO: 0.23 10*3/MM3 (ref 0–0.05)
IMM GRANULOCYTES NFR BLD AUTO: 1 % (ref 0–0.5)
LEFT ATRIUM VOLUME INDEX: 10.7 ML/M2
LYMPHOCYTES # BLD AUTO: 1.09 10*3/MM3 (ref 0.7–3.1)
LYMPHOCYTES NFR BLD AUTO: 4.9 % (ref 19.6–45.3)
MAXIMAL PREDICTED HEART RATE: 148 BPM
MCH RBC QN AUTO: 25.8 PG (ref 26.6–33)
MCHC RBC AUTO-ENTMCNC: 33.2 G/DL (ref 31.5–35.7)
MCV RBC AUTO: 77.6 FL (ref 79–97)
MONOCYTES # BLD AUTO: 1.22 10*3/MM3 (ref 0.1–0.9)
MONOCYTES NFR BLD AUTO: 5.5 % (ref 5–12)
NEUTROPHILS NFR BLD AUTO: 19.66 10*3/MM3 (ref 1.7–7)
NEUTROPHILS NFR BLD AUTO: 88.4 % (ref 42.7–76)
NRBC BLD AUTO-RTO: 0 /100 WBC (ref 0–0.2)
PLATELET # BLD AUTO: 244 10*3/MM3 (ref 140–450)
PMV BLD AUTO: 10 FL (ref 6–12)
POTASSIUM SERPL-SCNC: 4.2 MMOL/L (ref 3.5–5.2)
PROT ?TM UR-MCNC: 592.9 MG/DL
PROT/CREAT UR: ABNORMAL MG/G CREA (ref 0–200)
RBC # BLD AUTO: 3.92 10*6/MM3 (ref 4.14–5.8)
SODIUM SERPL-SCNC: 140 MMOL/L (ref 136–145)
STRESS TARGET HR: 126 BPM
WBC NRBC COR # BLD: 22.25 10*3/MM3 (ref 3.4–10.8)

## 2022-08-10 PROCEDURE — 93306 TTE W/DOPPLER COMPLETE: CPT

## 2022-08-10 PROCEDURE — 80048 BASIC METABOLIC PNL TOTAL CA: CPT | Performed by: STUDENT IN AN ORGANIZED HEALTH CARE EDUCATION/TRAINING PROGRAM

## 2022-08-10 PROCEDURE — 83605 ASSAY OF LACTIC ACID: CPT | Performed by: FAMILY MEDICINE

## 2022-08-10 PROCEDURE — 93356 MYOCRD STRAIN IMG SPCKL TRCK: CPT

## 2022-08-10 PROCEDURE — 25010000002 MEROPENEM PER 100 MG

## 2022-08-10 PROCEDURE — 93356 MYOCRD STRAIN IMG SPCKL TRCK: CPT | Performed by: INTERNAL MEDICINE

## 2022-08-10 PROCEDURE — 25010000002 HEPARIN (PORCINE) PER 1000 UNITS: Performed by: STUDENT IN AN ORGANIZED HEALTH CARE EDUCATION/TRAINING PROGRAM

## 2022-08-10 PROCEDURE — 93306 TTE W/DOPPLER COMPLETE: CPT | Performed by: INTERNAL MEDICINE

## 2022-08-10 PROCEDURE — 92610 EVALUATE SWALLOWING FUNCTION: CPT | Performed by: SPEECH-LANGUAGE PATHOLOGIST

## 2022-08-10 PROCEDURE — 85025 COMPLETE CBC W/AUTO DIFF WBC: CPT | Performed by: STUDENT IN AN ORGANIZED HEALTH CARE EDUCATION/TRAINING PROGRAM

## 2022-08-10 RX ORDER — MIDODRINE HYDROCHLORIDE 5 MG/1
5 TABLET ORAL
Status: DISCONTINUED | OUTPATIENT
Start: 2022-08-10 | End: 2022-08-16 | Stop reason: HOSPADM

## 2022-08-10 RX ORDER — SODIUM CHLORIDE 9 MG/ML
100 INJECTION, SOLUTION INTRAVENOUS CONTINUOUS
Status: DISCONTINUED | OUTPATIENT
Start: 2022-08-10 | End: 2022-08-11

## 2022-08-10 RX ADMIN — MEROPENEM 1 G: 1 INJECTION, POWDER, FOR SOLUTION INTRAVENOUS at 06:21

## 2022-08-10 RX ADMIN — MIDODRINE HYDROCHLORIDE 5 MG: 5 TABLET ORAL at 17:26

## 2022-08-10 RX ADMIN — NOREPINEPHRINE BITARTRATE 0.1 MCG/KG/MIN: 1 INJECTION, SOLUTION, CONCENTRATE INTRAVENOUS at 11:06

## 2022-08-10 RX ADMIN — METOCLOPRAMIDE 5 MG: 5 TABLET ORAL at 20:16

## 2022-08-10 RX ADMIN — HEPARIN SODIUM 5000 UNITS: 5000 INJECTION INTRAVENOUS; SUBCUTANEOUS at 21:18

## 2022-08-10 RX ADMIN — HEPARIN SODIUM 5000 UNITS: 5000 INJECTION INTRAVENOUS; SUBCUTANEOUS at 15:30

## 2022-08-10 RX ADMIN — MEROPENEM 1 G: 1 INJECTION, POWDER, FOR SOLUTION INTRAVENOUS at 12:39

## 2022-08-10 RX ADMIN — PANTOPRAZOLE SODIUM 40 MG: 40 INJECTION, POWDER, FOR SOLUTION INTRAVENOUS at 06:20

## 2022-08-10 RX ADMIN — Medication 10 ML: at 20:16

## 2022-08-10 RX ADMIN — HEPARIN SODIUM 5000 UNITS: 5000 INJECTION INTRAVENOUS; SUBCUTANEOUS at 06:21

## 2022-08-10 RX ADMIN — SODIUM CHLORIDE 100 ML/HR: 9 INJECTION, SOLUTION INTRAVENOUS at 12:38

## 2022-08-10 RX ADMIN — METOCLOPRAMIDE 5 MG: 5 TABLET ORAL at 17:26

## 2022-08-10 RX ADMIN — SODIUM BICARBONATE 150 MEQ: 84 INJECTION, SOLUTION INTRAVENOUS at 01:21

## 2022-08-10 NOTE — TREATMENT PLAN
I received a critical lab alert notifying me that 1 of 3 blood cultures come back showing Enterobacterales and Proteus species.    Patient is currently on ceftriaxone and may have adequate coverage but I feel at this time it would be more appropriate to switch to meropenem which potentially has greater activity against these 2 species    Plan:  1.  Discontinue ceftriaxone  2.  Start meropenem  3.  Follow closely cultures for sensitivities as the reported    Electronically signed by Horacio Aguilar MD, 08/10/22, 5:37 AM CDT.

## 2022-08-10 NOTE — PROGRESS NOTES
"[facility]  [address] [place] [address]. [id number]  T - [phone]  F - [phone]     Progress Note          PATIENT  DEMOGRAPHICS   PATIENT NAME: Fer Ivan                      PHYSICIAN: Tari Smalls MD  : 1950  MRN: [id number]   LOS: 1 day    Patient Care Team:  Estrada Fuentes MD as PCP - General (Family Medicine)  Subjective   SUBJECTIVE   Feels well, no soa         Objective   OBJECTIVE   Vital Signs  Temp:  [98.3 °F (36.8 °C)-99.9 °F (37.7 °C)] 98.6 °F (37 °C)  Heart Rate:  [104-135] 117  Resp:  [14-24] 22  BP: ()/(50-75) 100/65    Flowsheet Rows    Flowsheet Row First Filed Value   Admission Height 188 cm (74\") Documented at 2022 1045   Admission Weight 73.9 kg (163 lb) Documented at 2022 1045           I/O last 3 completed shifts:  In: 6136.1 [I.V.:1819.1; IV Piggyback:4317]  Out: 5 [Urine:]    PHYSICAL EXAM    Physical Exam  Constitutional:       Appearance: He is well-developed.   HENT:      Head: Normocephalic.   Eyes:      Pupils: Pupils are equal, round, and reactive to light.   Cardiovascular:      Rate and Rhythm: Normal rate and regular rhythm.      Heart sounds: Normal heart sounds.   Pulmonary:      Effort: Pulmonary effort is normal.      Breath sounds: Normal breath sounds.   Abdominal:      General: Bowel sounds are normal.      Palpations: Abdomen is soft.   Musculoskeletal:         General: No swelling.   Skin:     Coloration: Skin is not jaundiced.   Neurological:      General: No focal deficit present.      Mental Status: He is alert and oriented to person, place, and time.      Motor: Weakness present.         RESULTS   Results Review:    Results from last 7 days   Lab Units 08/10/22  0242 22  1721 22  1257 22  1130   SODIUM mmol/L 140 139  --  138   POTASSIUM mmol/L 4.2 5.3* 5.9* 6.4*   CHLORIDE mmol/L 104 105  --  96*   CO2 mmol/L 25.0 20.0*  --  19.0*   BUN mg/dL 68* 72*  --  78*   CREATININE mg/dL " 1.87* 2.55*  --  3.46*   CALCIUM mg/dL 8.3* 8.2*  --  10.0   BILIRUBIN mg/dL  --   --   --  0.7   ALK PHOS U/L  --   --   --  139*   ALT (SGPT) U/L  --   --   --  40   AST (SGOT) U/L  --   --   --  30   GLUCOSE mg/dL 163* 197*  --  210*       Estimated Creatinine Clearance: 35.1 mL/min (A) (by C-G formula based on SCr of 1.87 mg/dL (H)).    Results from last 7 days   Lab Units 08/09/22  1130   MAGNESIUM mg/dL 2.7*             Results from last 7 days   Lab Units 08/10/22  0242 08/09/22  1130   WBC 10*3/mm3 22.25* 31.33*   HEMOGLOBIN g/dL 10.1* 14.4   PLATELETS 10*3/mm3 244 424       Results from last 7 days   Lab Units 08/09/22  1129   INR  1.30*         Imaging Results (Last 24 Hours)     Procedure Component Value Units Date/Time    CT Abdomen Pelvis Without Contrast [[id number]] Collected: 08/09/22 1523     Updated: 08/09/22 1541    Narrative:      EXAM:  CT ABDOMEN PELVIS WITHOUT IV CONTRAST    ORDERING PROVIDER:  VESTA BEEBE    CLINICAL HISTORY:  Sepsis, abscess    COMPARISON:      TECHNIQUE:   CT abdomen and pelvis performed without IV or oral contrast,  reformatted in the sagittal and coronal planes.     This examination was performed according to our departmental dose  optimization program which includes automated exposure control,  adjustment of the MA and kV according to patient size, and/or use  of iterative reconstruction technique.    FINDINGS:    BASILAR CHEST: Mild to moderate left pleural effusion. Mild  consolidation in the lung bases on the left side and to a lesser  extent on the right.    LIVER: No mass, enlargement or abnormal density.    BILIARY TRACT: Unremarkable gallbladder.    SPLEEN: No mass or enlargement.    PANCREAS: No mass or inflammatory process. Normal pancreatic duct    ADRENAL GLANDS: Unremarkable. No mass.    URINARY SYSTEM: Kidneys are normal in size. No obstructing stone,  or gross mass. Bilateral mild hydroureteronephrosis concerning  for chronic urinary bladder outlet  obstruction. Bladder is mildly  distended without mass or stone.      GI TRACT: G-tube in place with good position. No mass, dilation,  or wall thickening.  No diverticula.  No hernia.  Appendix is  normal.      REPRODUCTIVE SYSTEM: Unremarkable    PERITONEAL SPACE:No free air, free fluid, mass or adenopathy.    RETROPERITONEAL SPACE:  No adenopathy, mass, aneurysm or  significant vascular abnormality.     BONES AND EXTRA-ABDOMINAL SOFT TISSUES: No aggressive osseous  lesion..  No inguinal adenopathy or hernia.      Impression:      Mild to moderate left pleural effusion.   Mild consolidation in the lung bases on the left side and to a  lesser extent on the right.  Bilateral mild hydroureteronephrosis concerning for chronic  urinary bladder outlet obstruction.   The urinary bladder is distended.  Good position of the G-tube.  Appendix is normal.    Electronically signed by:  Carlos Alberto Ospina MD  8/9/2022 3:39 PM CDT  Workstation: [id number]    XR Chest Post CVA Port [[id number]] Collected: 08/09/22 1411     Updated: 08/09/22 1430    Narrative:      EXAMINATION:  XR CHEST POST CVA PORT    CLINICAL HISTORY:  72 years Male,hypo, A41.9 Sepsis, unspecified  organism R65.20 Severe sepsis without septic shock N17.9 Acute  kidney failure, unspecified N17.9 Acute kidney failure,  unspecified E87.5 Hyperkalemia N39.0 Urinary tract infection,  site not specified R31.9 Hematuria, unspecified I95.9  Hypotension, unspecified    COMPARISON:  Chest x-ray dated 8/9/2022    FINDINGS:  Right PICC with tip in the mid SVC, new since earlier  today. No pneumothorax. Small left pleural effusion with adjacent  atelectasis/consolidation. Normal heart size. Evidence of a prior  granulomas process.      Impression:      New right PICC with tip in the SVC. No pneumothorax.    Electronically signed by:  Santos Meredith MD  8/9/2022 2:27 PM CDT  Workstation: [id number]    US Guidance PICC NC [[id number]] Resulted: 08/09/22 1421     Updated: 08/09/22 1421     Narrative:      This procedure was auto-finalized with no dictation required.    IR PICC W Imaging Guidance [[id number]] Resulted: 08/09/22 1418     Updated: 08/09/22 1418    Narrative:      This procedure was auto-finalized with no dictation required.    XR Chest 1 View [[id number]] Collected: 08/09/22 1054     Updated: 08/09/22 1142    Narrative:      EXAMINATION:  XR CHEST 1 VW    CLINICAL HISTORY:  72 years Male,Stroke Protocol (Onset > 12 hrs)    COMPARISON:  None    FINDINGS:  Small left pleural effusion with adjacent likely  atelectasis or possibly consolidation. No pneumothorax. Normal  heart size and pulmonary vascularity. Evidence of a prior  granulomatous process.      Impression:      Small left pleural effusion with adjacent  atelectasis or possibly consolidation.    Electronically signed by:  Santos Meredith MD  8/9/2022 11:40 AM CDT  Workstation: [id number]    CT Head Without Contrast Stroke Protocol [[id number]] Collected: 08/09/22 1053     Updated: 08/09/22 1122    Narrative:      PROCEDURE: CT HEAD WO CONTRAST STROKE    INDICATION:  CVA one month ago, follow-up    COMPARISON:  None    TECHNIQUE:  CT head, without iv contrast.       This exam was performed according to our departmental  dose-optimization program, which includes automated exposure  control, adjustment of the mA and/or kV according to patient size  and/or use of iterative reconstruction technique.  DLP is 892.5    FINDINGS:    The degree of cerebral and cerebellar atrophy is within normal  limits for age.    There is preservation of the gray-white matter differentiation.     Old infarcts in both basal ganglia are present, as well as a  larger areas of nonacute infarction/encephalomalacia in the right  frontoparietal region.    There are extensive age related degenerative cortical and deep  white matter changes.    There is no evidence of a hemorrhage or intracranial mass.    There is no midline shift.    The ventricles are normal in size  and configuration.    The brain stem, globes, paranasal sinuses, and osseous structures  are within normal limits.        Impression:      Generalized cerebral and cerebellar atrophy. No acute  intracranial abnormality, but there are areas of nonacute  infarction/encephalomalacia in the bilateral basal ganglia, as  well as the right frontal and parietal lobes.  If pain or symptoms persist beyond reasonable expectations, an  MRI examination is suggested as is deemed clinically appropriate.    Electronically signed by:  Eugenie Vera MD  8/9/2022 11:20 AM CDT  Workstation: [id number]           MEDICATIONS    FLUoxetine, 20 mg, Oral, Daily  heparin (porcine), 5,000 Units, Subcutaneous, Q8H  meropenem, 1 g, Intravenous, Q12H  metoclopramide, 5 mg, Oral, 4x Daily AC & at Bedtime  pantoprazole, 40 mg, Intravenous, Q AM  sodium chloride, 10 mL, Intravenous, Q12H      norepinephrine, 0.02-0.3 mcg/kg/min, Last Rate: 0.1 mcg/kg/min (08/09/22 2300)  sodium bicarbonate drip (greater than 75 mEq/bag), 150 mEq, Last Rate: 150 mEq (08/10/22 0121)        Assessment & Plan   ASSESSMENT / PLAN      Sepsis with acute renal failure without septic shock, due to unspecified organism, unspecified acute renal failure type (HCC)    1.  NELLIE- Baseline creatinine less than 1, peak up to 3.46.  Likely has prerenal NELLIE secondary to sepsis and hypotension.  He has received sepsis fluid bolus, Levophed and iv sodium bicarbonate drip.      Ct showed distended bladder and bilateral hydroureteronephrosis - Urology consult for possible need of SPC due to neurogenic bladder      2.  Hyperkalemia- improved     3.  Sepsis with septic shock- manage per primary team, possible urinary source on merrem     4.  Metabolic acidosis- corrected and change to NS     5.  UTI- manage per primary team     6.  Recent COVID                This document has been electronically signed by Tari Smalls MD on August 10, 2022 11:03 CDT

## 2022-08-10 NOTE — THERAPY EVALUATION
[facility] - Speech Language Pathology   Swallow Initial Evaluation [facility]     Patient Name: Fer Ivan  : 1950  MRN: [id number]  Today's Date: 8/10/2022               Admit Date: 2022     Pt seen for skilled ST services this date to assess swallow function s/p admission for stroke-like symptoms.  Per MD note pt is on puree solids at SNF and TF at night.  Pt was unable to provide info on if he was on thin or thickened liquids.  SLP provided full feeding assist to pt and assessed oropharyngeal swallow function w/puree solids and thin and NTL.  Pt consumed puree solids w/no difficulty.  Pt consumed ice chips w/no overt s/s of aspiration.  Pt consumed thin liquids via spoon w/throat clearing and extremely weak and unproductive cough.  Pt consumed NTL w/throat clear x2.  SLP recommends advancing pt's diet to puree/NTL at this time w/1-2 additional f/u sessions for diet safety and tolerance and trials of thin liquids as able.  SLP educated pt on results and recommendations and pt was in agreement.    Visit Dx:     ICD-10-CM ICD-9-CM   1. Sepsis with acute renal failure without septic shock, due to unspecified organism, unspecified acute renal failure type (HCC)  A41.9 038.9    R65.20 995.92    N17.9 584.9   2. Acute renal failure, unspecified acute renal failure type (HCC)  N17.9 584.9   3. Hyperkalemia  E87.5 276.7   4. Urinary tract infection with hematuria, site unspecified  N39.0 599.0    R31.9 599.70   5. Hypotension, unspecified hypotension type  I95.9 458.9   6. Oropharyngeal dysphagia  R13.12 787.22     Patient Active Problem List   Diagnosis   • Sepsis (HCC)   • Sepsis, due to unspecified organism, unspecified whether acute organ dysfunction present (HCC)   • Hematemesis with nausea   • Epigastric pain   • Absolute anemia   • Urinary tract infection associated with indwelling urethral catheter (HCC)   • Urinary retention   • Sepsis with acute renal failure without septic shock, due to unspecified  organism, unspecified acute renal failure type (HCC)     Past Medical History:   Diagnosis Date   • Stroke (HCC)      Past Surgical History:   Procedure Laterality Date   • COLONOSCOPY N/A 6/15/2022    Procedure: COLONOSCOPY;  Surgeon: Karen Kaye MD;  Location: [facility] ENDOSCOPY;  Service: Gastroenterology;  Laterality: N/A;   • ENDOSCOPY N/A 6/15/2022    Procedure: ESOPHAGOGASTRODUODENOSCOPY;  Surgeon: Karen Kaye MD;  Location: [facility] ENDOSCOPY;  Service: Gastroenterology;  Laterality: N/A;   • PEG TUBE INSERTION Left        SLP Recommendation and Plan  SLP Swallowing Diagnosis: mild-moderate, oral dysphagia, suspected pharyngeal dysphagia (08/10/22 1312)  SLP Diet Recommendation: puree, nectar thick liquids (08/10/22 1312)  Recommended Precautions and Strategies: upright posture during/after eating, small bites of food and sips of liquid, no straw, liquid via spoon only, general aspiration precautions, fatigue precautions, 1:1 supervision, assist with feeding (08/10/22 1312)  SLP Rec. for Method of Medication Administration: meds crushed, with pudding or applesauce (08/10/22 1312)     Monitor for Signs of Aspiration: yes, notify SLP if any concerns (08/10/22 1312)     Swallow Criteria for Skilled Therapeutic Interventions Met: demonstrates skilled criteria (08/10/22 1312)  Anticipated Discharge Disposition (SLP): skilled nursing facility (08/10/22 1312)  Rehab Potential/Prognosis, Swallowing: adequate, monitor progress closely (08/10/22 1312)  Therapy Frequency (Swallow): 1 day per week, 2 days per week (08/10/22 1312)  Predicted Duration Therapy Intervention (Days): 1 day, 3 days (08/10/22 1312)                                  Plan of Care Reviewed With: patient  Outcome Evaluation: Pt seen for skilled ST services this date to assess swallow function s/p admission for stroke-like symptoms.  Per MD note pt is on puree solids at SNF and TF at night.  Pt was unable to provide info on if he was on  thin or thickened liquids.  SLP provided full feeding assist to pt and assessed oropharyngeal swallow function w/puree solids and thin and NTL.  Pt consumed puree solids w/no difficulty.  Pt consumed ice chips w/no overt s/s of aspiration.  Pt consumed thin liquids via spoon w/throat clearing and extremely weak and unproductive cough.  Pt consumed NTL w/throat clear x2.  SLP recommends advancing pt's diet to puree/NTL at this time w/1-2 additional f/u sessions for diet safety and tolerance and trials of thin liquids as able.  SLP educated pt on results and recommendations and pt was in agreement.      SWALLOW EVALUATION (last 72 hours)     SLP Adult Swallow Evaluation     Row Name 08/10/22 1312                   Rehab Evaluation    Document Type evaluation  -CK        Total Evaluation Minutes, SLP 48  -CK                  General Information    Current Method of Nutrition NPO;gastrostomy feedings  -CK        Prior Level of Function-Communication receptive language impairment;expressive language impairment;cognitive-linguistic impairment  -CK        Prior Level of Function-Swallowing puree  -CK        Plans/Goals Discussed with patient;agreed upon  -CK        Barriers to Rehab cognitive status;previous functional deficit  -CK        Patient's Goals for Discharge patient did not state  -CK                  Pain    Additional Documentation Pain Scale: Numbers Pre/Post-Treatment (Group)  -CK                  Pain Scale: Numbers Pre/Post-Treatment    Pretreatment Pain Rating 0/10 - no pain  -CK        Posttreatment Pain Rating 0/10 - no pain  -CK                  Oral Motor Structure and Function    Dentition Assessment edentulous, does not have dentures  -CK        Secretion Management WNL/WFL  -CK        Mucosal Quality dry  -CK        Volitional Swallow unable to elicit  -CK        Volitional Cough non-productive;weak;reduced respiratory support  -CK                  General Eating/Swallowing Observations     Respiratory Support Currently in Use nasal cannula  -CK        Eating/Swallowing Skills fed by SLP  -CK        Positioning During Eating upright 90 degree;upright in bed  -CK        Utensils Used spoon  -CK        Consistencies Trialed thin liquids;ice chips;pureed  -CK        Pre SpO2 (%) 99  -CK        Post SpO2 (%) 97  -CK                  Clinical Swallow Eval    Oral Prep Phase WFL  for puree  -CK        Oral Transit WFL  for puree  -CK        Oral Residue WFL  for puree  -CK        Pharyngeal Phase suspected pharyngeal impairment  -CK        Esophageal Phase unremarkable  -CK        Clinical Swallow Evaluation Summary Pt seen for skilled ST services this date to assess swallow function s/p admission for stroke-like symptoms.  Per MD note pt is on puree solids at SNF and TF at night.  Pt was unable to provide info on if he was on thin or thickened liquids.  SLP provided full feeding assist to pt and assessed oropharyngeal swallow function w/puree solids and thin and NTL.  Pt consumed puree solids w/no difficulty.  Pt consumed ice chips w/no overt s/s of aspiration.  Pt consumed thin liquids via spoon w/throat clearing and extremely weak and unproductive cough.  Pt consumed NTL w/throat clear x2.  SLP recommends advancing pt's diet to puree/NTL at this time w/1-2 additional f/u sessions for diet safety and tolerance and trials of thin liquids as able.  SLP educated pt on results and recommendations and pt was in agreement.  -CK                  Pharyngeal Phase Concerns    Pharyngeal Phase Concerns cough;throat clear  -CK        Cough thin  -CK        Throat Clear thin  -CK                  Swallowing Quality of Life Assessment    Education and counseling provided Signs of aspiration;Risks of aspiration;Safest diet options;Aspiration precautions;Compensatory strategy recommendations and rationale;Feeding assistance and techniques  -CK                  SLP Evaluation Clinical Impression    SLP Swallowing  Diagnosis mild-moderate;oral dysphagia;suspected pharyngeal dysphagia  -CK        Functional Impact risk of aspiration/pneumonia;risk of malnutrition;risk of dehydration  -CK        Rehab Potential/Prognosis, Swallowing adequate, monitor progress closely  -CK        Swallow Criteria for Skilled Therapeutic Interventions Met demonstrates skilled criteria  -CK                  SLP Treatment Clinical Impressions    Care Plan Review evaluation/treatment results reviewed;care plan/treatment goals reviewed;risks/benefits reviewed;current/potential barriers reviewed;patient/other agree to care plan  -CK                  Recommendations    Therapy Frequency (Swallow) 1 day per week;2 days per week  -CK        Predicted Duration Therapy Intervention (Days) 1 day;3 days  -CK        SLP Diet Recommendation puree;nectar thick liquids  -CK        Recommended Precautions and Strategies upright posture during/after eating;small bites of food and sips of liquid;no straw;liquid via spoon only;general aspiration precautions;fatigue precautions;1:1 supervision;assist with feeding  -CK        SLP Rec. for Method of Medication Administration meds crushed;with pudding or applesauce  -CK        Monitor for Signs of Aspiration yes;notify SLP if any concerns  -CK        Anticipated Discharge Disposition (SLP) skilled nursing facility  -CK                  Swallow Goals (SLP)    Swallow LTGs Patient will demonstrate functional swallow for  -CK        Swallow STGs diet tolerance goal selection (SLP)  -CK        Diet Tolerance Goal Selection (SLP) Patient will tolerate trials of  -CK                  (LTG) Patient will demonstrate functional swallow for    Diet Texture (Demonstrate functional swallow) pureed textures  -CK        [place] (Demonstrate functional swallow) with 1:1 assist/ supervision  -CK        Time Frame (Demonstrate functional swallow) by discharge  -CK        Barriers (Demonstrate functional swallow) previous deficits   -CK        Progress/Outcomes (Demonstrate functional swallow) new goal  -CK                  (STG) Patient will tolerate trials of    Consistencies Trialed (Tolerate trials) pureed textures;thin liquids;nectar/ mildly thick liquids  -CK        Desired Outcome (Tolerate trials) without signs/symptoms of aspiration;with adequate oral prep/transit/clearance  -CK        [place] (Tolerate trials) with 1:1 assist/ supervision  -CK        Time Frame (Tolerate trials) 1 week  -CK        Progress/Outcomes (Tolerate trials) new goal  -CK              User Key  (r) = Recorded By, (t) = Taken By, (c) = Cosigned By    Initials Name Effective Dates    Simran Villatoro MS CCC-SLP 06/16/21 -                 EDUCATION  The patient has been educated in the following areas:   Dysphagia (Swallowing Impairment) Modified Diet Instruction.        SLP GOALS     Row Name 08/10/22 1312             (LTG) Patient will demonstrate functional swallow for    Diet Texture (Demonstrate functional swallow) pureed textures  -CK      [place] (Demonstrate functional swallow) with 1:1 assist/ supervision  -CK      Time Frame (Demonstrate functional swallow) by discharge  -CK      Barriers (Demonstrate functional swallow) previous deficits  -CK      Progress/Outcomes (Demonstrate functional swallow) new goal  -CK              (STG) Patient will tolerate trials of    Consistencies Trialed (Tolerate trials) pureed textures;thin liquids;nectar/ mildly thick liquids  -CK      Desired Outcome (Tolerate trials) without signs/symptoms of aspiration;with adequate oral prep/transit/clearance  -CK      [place] (Tolerate trials) with 1:1 assist/ supervision  -CK      Time Frame (Tolerate trials) 1 week  -CK      Progress/Outcomes (Tolerate trials) new goal  -CK            User Key  (r) = Recorded By, (t) = Taken By, (c) = Cosigned By    Initials Name Provider Type    Simran Villatoro MS CCC-SLP Speech and Language Pathologist                    Time Calculation:    Time Calculation- SLP     Row Name 08/10/22 1406             Time Calculation- SLP    SLP Start Time 1312  -CK      SLP Stop Time 1400  -CK      SLP Time Calculation (min) 48 min  -CK      Total Timed Code Minutes- SLP 48 minute(s)  -CK      SLP Received On 08/10/22  -CK      SLP Goal Re-Cert Due Date 08/24/22  -CK              Untimed Charges    SLP Eval/Re-eval  ST Eval Oral Pharyng Swallow - 39332  -CK      [id number]-[id number] Eval Oral Pharyng Swallow Minutes 48  -CK              Total Minutes    Untimed Charges Total Minutes 48  -CK       Total Minutes 48  -CK            User Key  (r) = Recorded By, (t) = Taken By, (c) = Cosigned By    Initials Name Provider Type     Simran Colby MS CCC-SLP Speech and Language Pathologist                Therapy Charges for Today     Code Description Service Date Service Provider Modifiers Qty    [id number] HC ST EVAL ORAL PHARYNG SWALLOW 3 8/10/2022 Simran Colby MS CCC-SLP GN 1               Simran Colby MS CCC-SLP  8/10/2022

## 2022-08-10 NOTE — PLAN OF CARE
Problem: Adult Inpatient Plan of Care  Goal: Plan of Care Review  Outcome: Ongoing, Progressing  Flowsheets (Taken 8/10/2022 1122)  Plan of Care Reviewed With: other (see comments)   Goal Outcome Evaluation:  Plan of Care Reviewed With: other (see comments)            sepsis and acute renal failure. RN states clifton was removed w/ a large amount of blood and pus noted. Pt w/ hx COVID July 2022, PEG placement 6/2022. NPO at this time. Levo in place w/ bicarb drip. Noted Hx of pureed po during day at NH and EN at night. # w/ BMI 19.6. Last admit, pt received Isosource 1.5 at goal rate 60ml/hr and flush 25ml/hr (+600ml) to provide 2160cal, 98g pro and 1094ml (+600ml flush) = 1694ml fluids/day. Will monitor pressor use to initiate TF in next day or two.

## 2022-08-10 NOTE — PLAN OF CARE
Goal Outcome Evaluation:  Plan of Care Reviewed With: patient           Outcome Evaluation: Pt seen for skilled ST services this date to assess swallow function s/p admission for stroke-like symptoms.  Per MD note pt is on puree solids at SNF and TF at night.  Pt was unable to provide info on if he was on thin or thickened liquids.  SLP provided full feeding assist to pt and assessed oropharyngeal swallow function w/puree solids and thin and NTL.  Pt consumed puree solids w/no difficulty.  Pt consumed ice chips w/no overt s/s of aspiration.  Pt consumed thin liquids via spoon w/throat clearing and extremely weak and unproductive cough.  Pt consumed NTL w/throat clear x2.  SLP recommends advancing pt's diet to puree/NTL at this time w/1-2 additional f/u sessions for diet safety and tolerance and trials of thin liquids as able.  SLP educated pt on results and recommendations and pt was in agreement.

## 2022-08-10 NOTE — PROGRESS NOTES
Adult Nutrition  Assessment    Patient Name:  Fer Ivan  YOB: 1950  MRN: [id number]  Admit Date:  8/9/2022    Assessment Date:  8/10/2022    Comments:  73yo male admit from SNF w/ sepsis and acute renal failure. RN states clifton was removed w/ a large amount of blood and pus noted. Pt w/ hx COVID July 2022, PEG placement 6/2022. NPO at this time. Levo in place w/ bicarb drip. Noted Hx of pureed po during day at NH and EN at night. # w/ BMI 19.6. Last admit, pt received Isosource 1.5 at goal rate 60ml/hr and flush 25ml/hr (+600ml) to provide 2160cal, 98g pro and 1094ml (+600ml flush) = 1694ml fluids/day. Will monitor pressor use to initiate TF in next day or two.     Reason for Assessment     Row Name 08/10/22 1045          Reason for Assessment    Reason For Assessment identified at risk by screening criteria     Diagnosis infection/sepsis;renal disease     Identified At Risk by Screening Criteria tube feeding or parenteral nutrition;difficulty chewing/swallowing                Nutrition/Diet History     Row Name 08/10/22 1046          Nutrition/Diet History    Typical Intake (Food/Fluid/EN/PN) Pt remains npo- levo running, Has PEG. Hx of pureed during day at NH and EN at night                Anthropometrics     Row Name 08/10/22 1057 08/10/22 0500       Anthropometrics    Weight -- 69.5 kg (153 lb 3.5 oz)    Weight for Calculation 69.4 kg (153 lb) --               Labs/Tests/Procedures/Meds     Row Name 08/10/22 1050          Labs/Procedures/Meds    Lab Results Reviewed reviewed     Lab Results Comments Glu 163H, BUN 68H/Cr 1.8H, alb 4.1            Diagnostic Tests/Procedures    Diagnostic Test/Procedure Reviewed reviewed     Diagnostic Test/Procedures Comments PEG            Medications    Pertinent Medications Reviewed reviewed     Pertinent Medications Comments levo, Na bcarb gtt                  Estimated/Assessed Needs - Anthropometrics     Row Name 08/10/22 1057 08/10/22 0500        Anthropometrics    Weight -- 69.5 kg (153 lb 3.5 oz)    Weight for Calculation 69.4 kg (153 lb) --       Estimated/Assessed Needs    Additional Documentation KCAL/KG (Group);Estimated Calorie Needs (Group);Protein Requirements (Group);Fluid Requirements (Group) --       Estimated Calorie Needs    Estimated Calorie Requirement (kcal/day) 1850 --       KCAL/KG    KCAL/KG 25 Kcal/Kg (kcal);30 Kcal/Kg (kcal) --    25 Kcal/Kg (kcal) 1735 --    30 Kcal/Kg (kcal) 2082 --       Protein Requirements    Weight Used For Protein Calculations 69.4 kg (153 lb) --    Est Protein Requirement Amount (gms/kg) 0.8 gm protein --    Estimated Protein Requirements (gms/day) 55.52 --       Fluid Requirements    Fluid Requirements (mL/day) 1800 --    RDA Method (mL) 1800 --               Nutrition Prescription Ordered     Row Name 08/10/22 1058          Nutrition Prescription PO    Current PO Diet NPO                       Electronically signed by:  Deanne Fuentes RD  08/10/22 11:01 CDT

## 2022-08-10 NOTE — PLAN OF CARE
Goal Outcome Evaluation:  Plan of Care Reviewed With: patient        Progress: no change  Outcome Evaluation: pt remains on levo gtt at 0.1, attempting to titrate down; pt denies pain; UOP adequate; resting inbetween care; all needs met at this time

## 2022-08-11 LAB
ANION GAP SERPL CALCULATED.3IONS-SCNC: 8 MMOL/L (ref 5–15)
BACTERIA SPEC AEROBE CULT: ABNORMAL
BASOPHILS # BLD AUTO: 0.03 10*3/MM3 (ref 0–0.2)
BASOPHILS NFR BLD AUTO: 0.2 % (ref 0–1.5)
BUN SERPL-MCNC: 43 MG/DL (ref 8–23)
BUN/CREAT SERPL: 51.8 (ref 7–25)
CALCIUM SPEC-SCNC: 8.5 MG/DL (ref 8.6–10.5)
CHLORIDE SERPL-SCNC: 109 MMOL/L (ref 98–107)
CO2 SERPL-SCNC: 27 MMOL/L (ref 22–29)
CREAT SERPL-MCNC: 0.83 MG/DL (ref 0.76–1.27)
DEPRECATED RDW RBC AUTO: 44.6 FL (ref 37–54)
EGFRCR SERPLBLD CKD-EPI 2021: 93 ML/MIN/1.73
EOSINOPHIL # BLD AUTO: 0.09 10*3/MM3 (ref 0–0.4)
EOSINOPHIL NFR BLD AUTO: 0.7 % (ref 0.3–6.2)
ERYTHROCYTE [DISTWIDTH] IN BLOOD BY AUTOMATED COUNT: 15.8 % (ref 12.3–15.4)
GLUCOSE SERPL-MCNC: 98 MG/DL (ref 65–99)
HCT VFR BLD AUTO: 29.6 % (ref 37.5–51)
HGB BLD-MCNC: 9.6 G/DL (ref 13–17.7)
IMM GRANULOCYTES # BLD AUTO: 0.11 10*3/MM3 (ref 0–0.05)
IMM GRANULOCYTES NFR BLD AUTO: 0.8 % (ref 0–0.5)
LYMPHOCYTES # BLD AUTO: 0.63 10*3/MM3 (ref 0.7–3.1)
LYMPHOCYTES NFR BLD AUTO: 4.7 % (ref 19.6–45.3)
MCH RBC QN AUTO: 25.5 PG (ref 26.6–33)
MCHC RBC AUTO-ENTMCNC: 32.4 G/DL (ref 31.5–35.7)
MCV RBC AUTO: 78.7 FL (ref 79–97)
MONOCYTES # BLD AUTO: 0.63 10*3/MM3 (ref 0.1–0.9)
MONOCYTES NFR BLD AUTO: 4.7 % (ref 5–12)
NEUTROPHILS NFR BLD AUTO: 11.83 10*3/MM3 (ref 1.7–7)
NEUTROPHILS NFR BLD AUTO: 88.9 % (ref 42.7–76)
NRBC BLD AUTO-RTO: 0 /100 WBC (ref 0–0.2)
PLATELET # BLD AUTO: 180 10*3/MM3 (ref 140–450)
PMV BLD AUTO: 10 FL (ref 6–12)
POTASSIUM SERPL-SCNC: 4.5 MMOL/L (ref 3.5–5.2)
RBC # BLD AUTO: 3.76 10*6/MM3 (ref 4.14–5.8)
SODIUM SERPL-SCNC: 144 MMOL/L (ref 136–145)
WBC NRBC COR # BLD: 13.32 10*3/MM3 (ref 3.4–10.8)

## 2022-08-11 PROCEDURE — 25010000002 HEPARIN (PORCINE) PER 1000 UNITS: Performed by: STUDENT IN AN ORGANIZED HEALTH CARE EDUCATION/TRAINING PROGRAM

## 2022-08-11 PROCEDURE — 92526 ORAL FUNCTION THERAPY: CPT | Performed by: SPEECH-LANGUAGE PATHOLOGIST

## 2022-08-11 PROCEDURE — 99231 SBSQ HOSP IP/OBS SF/LOW 25: CPT | Performed by: NURSE PRACTITIONER

## 2022-08-11 PROCEDURE — 85025 COMPLETE CBC W/AUTO DIFF WBC: CPT | Performed by: STUDENT IN AN ORGANIZED HEALTH CARE EDUCATION/TRAINING PROGRAM

## 2022-08-11 PROCEDURE — 36415 COLL VENOUS BLD VENIPUNCTURE: CPT | Performed by: STUDENT IN AN ORGANIZED HEALTH CARE EDUCATION/TRAINING PROGRAM

## 2022-08-11 PROCEDURE — 80048 BASIC METABOLIC PNL TOTAL CA: CPT | Performed by: STUDENT IN AN ORGANIZED HEALTH CARE EDUCATION/TRAINING PROGRAM

## 2022-08-11 PROCEDURE — 25010000002 MEROPENEM PER 100 MG

## 2022-08-11 RX ADMIN — HEPARIN SODIUM 5000 UNITS: 5000 INJECTION INTRAVENOUS; SUBCUTANEOUS at 21:20

## 2022-08-11 RX ADMIN — SODIUM CHLORIDE 100 ML/HR: 9 INJECTION, SOLUTION INTRAVENOUS at 02:42

## 2022-08-11 RX ADMIN — METOCLOPRAMIDE 5 MG: 5 TABLET ORAL at 12:21

## 2022-08-11 RX ADMIN — Medication 10 ML: at 08:20

## 2022-08-11 RX ADMIN — Medication 10 ML: at 20:00

## 2022-08-11 RX ADMIN — MEROPENEM 1 G: 1 INJECTION, POWDER, FOR SOLUTION INTRAVENOUS at 12:21

## 2022-08-11 RX ADMIN — BACLOFEN 10 MG: 10 TABLET ORAL at 20:00

## 2022-08-11 RX ADMIN — PANTOPRAZOLE SODIUM 40 MG: 40 INJECTION, POWDER, FOR SOLUTION INTRAVENOUS at 06:24

## 2022-08-11 RX ADMIN — METOCLOPRAMIDE 5 MG: 5 TABLET ORAL at 17:14

## 2022-08-11 RX ADMIN — METOCLOPRAMIDE 5 MG: 5 TABLET ORAL at 06:40

## 2022-08-11 RX ADMIN — MIDODRINE HYDROCHLORIDE 5 MG: 5 TABLET ORAL at 12:21

## 2022-08-11 RX ADMIN — MIDODRINE HYDROCHLORIDE 5 MG: 5 TABLET ORAL at 17:14

## 2022-08-11 RX ADMIN — HEPARIN SODIUM 5000 UNITS: 5000 INJECTION INTRAVENOUS; SUBCUTANEOUS at 06:25

## 2022-08-11 RX ADMIN — HEPARIN SODIUM 5000 UNITS: 5000 INJECTION INTRAVENOUS; SUBCUTANEOUS at 14:17

## 2022-08-11 RX ADMIN — FLUOXETINE 20 MG: 20 CAPSULE ORAL at 08:20

## 2022-08-11 RX ADMIN — MIDODRINE HYDROCHLORIDE 5 MG: 5 TABLET ORAL at 06:40

## 2022-08-11 RX ADMIN — METOCLOPRAMIDE 5 MG: 5 TABLET ORAL at 20:00

## 2022-08-11 RX ADMIN — MEROPENEM 1 G: 1 INJECTION, POWDER, FOR SOLUTION INTRAVENOUS at 00:10

## 2022-08-11 NOTE — SIGNIFICANT NOTE
08/11/22 1144   Symptom Distress   Pain Score 0    ESAS Tiredness Score 5   ESAS Nausea Score 0   ESAS Depression Score 8   ESAS Anxiety Score 5  (pt states he feels anxious about going back to nursing home; states he wants to go back home)   ESAS Drowsiness Score 8   ESAS Lack of Appetite Score 8   ESAS Wellbeing Score 3   ESAS Dyspnea Score 0   ESAS Source of Information patient   Palliative Performance Scale Score 20%   Pain Assessment   Preferred Pain Scale number (Numeric Rating Pain Scale)   PAINAD Breathing 0-->normal  (pt currently on O2)   PAINAD Facial Expression 0-->smiling or inexpressive   PAINAD Body Language 0-->relaxed   PAINAD Consolability 0-->no need to console   Palliative Care Acuity Data   Psychosocial Acuity   (unable to answer at this time; will place call to his daugher Ruthann who is health care surrogate)   Health Care Directives/Treatment Preferences   Surrogate Decision Maker identified and documented  (daughter Ruthann is listed as health care surrogate)   Pain   (0-10) Pain Rating: Activity 0   (0-10) Pain Rating: Rest 0

## 2022-08-11 NOTE — PROGRESS NOTES
[facility] Medicine Services  INPATIENT PROGRESS NOTE    Length of Stay: 2  Date of Admission: 8/9/2022  Primary Care Physician: Estrada Fuentes MD    Subjective     Patient seen and evaluated, did not have any complains or issues to report. Patient weaned off levophed this morning.     Objective    Temp:  [97.5 °F (36.4 °C)-98.5 °F (36.9 °C)] 97.5 °F (36.4 °C)  Heart Rate:  [] 97  Resp:  [16-22] 18  BP: ()/(50-68) 101/61    Physical Exam:   Temp:  [97.5 °F (36.4 °C)-98.5 °F (36.9 °C)] 97.5 °F (36.4 °C)  Heart Rate:  [] 97  Resp:  [16-22] 18  BP: ()/(50-68) 101/61  Physical Exam  General: NC/AT, appears stated age, alert and oriented x3  HEENT: PERRLA, EOMI, no scleral icterus, no conjunctival injection,   NECK:  Neck is supple, no JVD, no supraclavicular lymphadenopathy  CV: S1 S2 RRR, no murmurs, no gallops, no heaves, pulses palpable.  LUNG: CTA, no wheezing crackles or rhonchi  ABD: Nondistended, nontender, bowel sounds present, no guarding or rebound, organomegaly not appreciated.   EXT: Limited ROM of left extremity and lower extremity from previous CVA, pulses palpable, stasis edema  Neuro: CN 2-12, grossly intact, h/o CVA with residual weakness  Skin: Warm and dry, no rash or lesions.    Results Review:  I have reviewed the labs, radiology results, and diagnostic studies.    Laboratory Data:   Results from last 7 days   Lab Units 08/11/22  0617 08/10/22  0242 08/09/22  1721 08/09/22  1257 08/09/22  1130   SODIUM mmol/L 144 140 139  --  138   POTASSIUM mmol/L 4.5 4.2 5.3*   < > 6.4*   CHLORIDE mmol/L 109* 104 105  --  96*   CO2 mmol/L 27.0 25.0 20.0*  --  19.0*   BUN mg/dL 43* 68* 72*  --  78*   CREATININE mg/dL 0.83 1.87* 2.55*  --  3.46*   GLUCOSE mg/dL 98 163* 197*  --  210*   CALCIUM mg/dL 8.5* 8.3* 8.2*  --  10.0   BILIRUBIN mg/dL  --   --   --   --  0.7   ALK PHOS U/L  --   --   --   --  139*   ALT (SGPT) U/L  --   --   --   --   40   AST (SGOT) U/L  --   --   --   --  30   ANION GAP mmol/L 8.0 11.0 14.0  --  23.0*    < > = values in this interval not displayed.     Estimated Creatinine Clearance: 83.1 mL/min (by C-G formula based on SCr of 0.83 mg/dL).  Results from last 7 days   Lab Units 08/09/22  1130   MAGNESIUM mg/dL 2.7*         Results from last 7 days   Lab Units 08/11/22  0617 08/10/22  0242 08/09/22  1130   WBC 10*3/mm3 13.32* 22.25* 31.33*   HEMOGLOBIN g/dL 9.6* 10.1* 14.4   HEMATOCRIT % 29.6* 30.4* 43.7   PLATELETS 10*3/mm3 180 244 424     Results from last 7 days   Lab Units 08/09/22  1129   INR  1.30*       Culture Data:   Blood Culture   Date Value Ref Range Status   08/09/2022 No growth at 2 days  Preliminary   08/09/2022 Proteus species (C)  Preliminary     Urine Culture   Date Value Ref Range Status   08/09/2022 >100,000 CFU/mL Proteus mirabilis (A)  Final     No results found for: RESPCX  No results found for: WOUNDCX  No results found for: STOOLCX  No components found for: BODYFLD    Radiology Data:   Imaging Results (Last 24 Hours)     ** No results found for the last 24 hours. **          I have reviewed the patient's current medications.     Assessment/Plan     Active Hospital Problems    Diagnosis    • Sepsis with acute renal failure without septic shock, due to unspecified organism, unspecified acute renal failure type (HCC)    NELLIE, resolved  Bacteremia with Proteus mirabilis  Shock, resolved  H/o CVA with residual weakness        ECHO, 8/10  Left ventricular ejection fraction appears to be 26 - 30%. Left ventricular systolic function is severely decreased.  Left ventricular diastolic function is consistent with (grade Ia w/high LAP) impaired relaxation.  There is a moderate sized left pleural effusion.         08/11  Plan:   -Continue IV antibiotics with Merrem  -Levophed weaned off, continue midodrine for hypotension  -Repeat blood culture tomorrow  -Tube feeding restarted  -Palliative consult done  today    -DNR  -DVT and GI prophylaxis        Discharge Planning: Pending response to therapy, patient is still critically ill.       Benito Lucio MD

## 2022-08-11 NOTE — HOSPICE
Patient wishing to revocate hospice in order to seek aggressive treatment. Dr. Kohler aware of requested revocation and agrees to order to dischasrge.

## 2022-08-11 NOTE — PLAN OF CARE
Problem: Adult Inpatient Plan of Care  Goal: Plan of Care Review  Outcome: Ongoing, Progressing  Flowsheets (Taken 8/11/2022 1230)  Plan of Care Reviewed With: other (see comments)   Goal Outcome Evaluation:  Plan of Care Reviewed With: other (see comments)            SLP has placed pt on pureed w/ NTL- 25% at breakfast. Levo has been on hold all morning. 8/10 153# w/ BMI 19.6. RD to adjust recommendations to only 16hr/day w/ po intake--rec Isosource 1.5 to goal rate 60ml/hr g18eyner and flush 25ml/hr x16hrs (+400ml) to provide 1440cal, 65g pro and 729ml (+400ml flush) = 1129ml fluids/day. RD following.

## 2022-08-11 NOTE — CONSULTS
8/11/2022    Patient being seen today at request of hospitalist service.    Patient is 72 yr old male who was admitted from nursing home. He was residing in nursing home after CVA. He was placed on hospice care towards end of June due to no significant improvement.   He was brought to ER with complaint of confusion and concern for another stroke. Hospice care were revoked at that time.    Patient was admitted on 8/9/2022 with septic shock and acute renal failure. Started IV antibiotics, pressors and nephrology consulted. Per Dr. Knutsno note, prognosis is guarded and family did transition to DNR/DNI.    Patient is awake and alert in bed this morning. He is able to answer some screening assessment questions. These are documented in the palliative care assessment.    Patient denies any current pain; his biggest concern that he relays to me is that he does not want to go back to the nursing home. I will try to reach out to the daughter by telephone today and discuss further with her as he is listed as his health care surrogate.  Will need to discuss future goals/expectations with her.      Physical Exam:  General; no acute distress  Lungs: normal breath sounds; no wheezing  Card: RRR  Skin; warm and dry        Assessment and Plan:    -past CVA was in nursing home with hospice care; hospice revoked and patient brought to ED; admitted for septic shock and acute renal failure.  He would qualify for palliative care but at this time I need to speak with his daughter to discuss future goals/plans.    Will update in patient chart after making contact with the daughter.

## 2022-08-11 NOTE — THERAPY TREATMENT NOTE
Acute Care - Speech Language Pathology   Swallow Treatment Note [facility]     Patient Name: Fer Ivan  : 1950  MRN: [id number]  Today's Date: 2022               Admit Date: 2022    Visit Dx:     ICD-10-CM ICD-9-CM   1. Sepsis with acute renal failure without septic shock, due to unspecified organism, unspecified acute renal failure type (HCC)  A41.9 038.9    R65.20 995.92    N17.9 584.9   2. Acute renal failure, unspecified acute renal failure type (HCC)  N17.9 584.9   3. Hyperkalemia  E87.5 276.7   4. Urinary tract infection with hematuria, site unspecified  N39.0 599.0    R31.9 599.70   5. Hypotension, unspecified hypotension type  I95.9 458.9   6. Oropharyngeal dysphagia  R13.12 787.22     Patient Active Problem List   Diagnosis   • Sepsis (HCC)   • Sepsis, due to unspecified organism, unspecified whether acute organ dysfunction present (HCC)   • Hematemesis with nausea   • Epigastric pain   • Absolute anemia   • Urinary tract infection associated with indwelling urethral catheter (HCC)   • Urinary retention   • Sepsis with acute renal failure without septic shock, due to unspecified organism, unspecified acute renal failure type (HCC)     Past Medical History:   Diagnosis Date   • Stroke (HCC)      Past Surgical History:   Procedure Laterality Date   • COLONOSCOPY N/A 6/15/2022    Procedure: COLONOSCOPY;  Surgeon: Karen Kaye MD;  Location: [facility] ENDOSCOPY;  Service: Gastroenterology;  Laterality: N/A;   • ENDOSCOPY N/A 6/15/2022    Procedure: ESOPHAGOGASTRODUODENOSCOPY;  Surgeon: Karen Kaye MD;  Location: [facility] ENDOSCOPY;  Service: Gastroenterology;  Laterality: N/A;   • PEG TUBE INSERTION Left        SLP Recommendation and Plan     SLP Diet Recommendation: puree, nectar thick liquids (22 0818)  Recommended Precautions and Strategies: upright posture during/after eating, small bites of food and sips of liquid, no straw, liquid via spoon only, general aspiration  precautions, fatigue precautions, 1:1 supervision, assist with feeding (08/11/22 0818)  SLP Rec. for Method of Medication Administration: meds crushed, with pudding or applesauce (08/11/22 0818)     Monitor for Signs of Aspiration: yes, notify SLP if any concerns (08/11/22 0818)        Anticipated Discharge Disposition (SLP): skilled nursing facility (08/11/22 0818)     Therapy Frequency (Swallow): 1 day per week, 2 days per week (08/11/22 0818)  Predicted Duration Therapy Intervention (Days): 1 day, 3 days (08/11/22 0818)     Daily Summary of Progress (SLP): progress toward functional goals as expected (08/11/22 0818)               Treatment Assessment (SLP): ST: pt tolerates NTL and puree with no s/s of aspiration.  recommend continue NTL with no straw with TF at noc per baseline from SNF.  d/c skilled ST at this time. (08/11/22 0818)  Plan for Continued Treatment (SLP): continue treatment per plan of care (08/11/22 0818)         Plan of Care Reviewed With: patient  Progress: improving  Outcome Evaluation: ST: pt consumed entire coke and serving of ice cream.  he took meds whole with coke.  he took one bite of chewable solids, and c/o and attempted to spit it out.  clinician informed pt he would not get more coke if he spit it out.  pt is appropriate for diet at this time, but getting him to eat it may be difficult given his defiant nature while in the hospital.  SLP will f/u for diet tolerance.      SWALLOW EVALUATION (last 72 hours)     SLP Adult Swallow Evaluation     Row Name 08/11/22 0818 08/10/22 1312                Rehab Evaluation    Document Type therapy note (daily note)  -EC evaluation  -CK       Total Evaluation Minutes, SLP -- 48  -CK       Subjective Information no complaints  -EC --       Patient Observations alert;cooperative;agree to therapy  -EC --       Patient/Family/Caregiver Comments/Observations none  -EC --       Patient Effort good  -EC --                General Information    Patient  Profile Reviewed yes  -EC --       Current Method of Nutrition pureed;nectar/syrup-thick liquids  -EC NPO;gastrostomy feedings  -CK       Precautions/Limitations, Vision WFL;for purposes of eval  -EC --       Precautions/Limitations, Hearing WFL;for purposes of eval  -EC --       Prior Level of Function-Communication receptive language impairment;expressive language impairment;cognitive-linguistic impairment  -EC receptive language impairment;expressive language impairment;cognitive-linguistic impairment  -CK       Prior Level of Function-Swallowing puree  -EC puree  -CK       Plans/Goals Discussed with patient;agreed upon  -EC patient;agreed upon  -CK       Barriers to Rehab cognitive status;previous functional deficit  -EC cognitive status;previous functional deficit  -CK       Patient's Goals for Discharge patient did not state  -EC patient did not state  -CK                Pain    Additional Documentation -- Pain Scale: Numbers Pre/Post-Treatment (Group)  -CK                Pain Scale: Numbers Pre/Post-Treatment    Pretreatment Pain Rating 0/10 - no pain  -EC 0/10 - no pain  -CK       Posttreatment Pain Rating 0/10 - no pain  -EC 0/10 - no pain  -CK                Oral Motor Structure and Function    Dentition Assessment edentulous, does not have dentures  -EC edentulous, does not have dentures  -CK       Secretion Management WNL/WFL  -EC WNL/WFL  -CK       Mucosal Quality dry  -EC dry  -CK       Volitional Swallow weak  -EC unable to elicit  -CK       Volitional Cough non-productive;weak;reduced respiratory support  -EC non-productive;weak;reduced respiratory support  -CK                General Eating/Swallowing Observations    Respiratory Support Currently in Use nasal cannula  -EC nasal cannula  -CK       Eating/Swallowing Skills fed by SLP;self-fed  -EC fed by SLP  -CK       Positioning During Eating upright 90 degree;upright in bed  -EC upright 90 degree;upright in bed  -CK       Utensils Used spoon;cup   -EC spoon  -CK       Consistencies Trialed pureed;nectar/syrup-thick liquids  -EC thin liquids;ice chips;pureed  -CK       Pre SpO2 (%) -- 99  -CK       Post SpO2 (%) -- 97  -CK                Clinical Swallow Eval    Oral Prep Phase WFL  for puree  -EC WFL  for puree  -CK       Oral Transit WFL  for puree  -EC WFL  for puree  -CK       Oral Residue WFL  for puree  -EC WFL  for puree  -CK       Pharyngeal Phase suspected pharyngeal impairment  -EC suspected pharyngeal impairment  -CK       Esophageal Phase unremarkable  -EC unremarkable  -CK       Clinical Swallow Evaluation Summary pt alert and tolerates puree solids with good oral clearance and timely swallow.  pt tolerates NTL via cup rim.  pt does tend to attempt consecutive drinks from cup rim. and not as aware of safety concerns with liquids.  -EC Pt seen for skilled ST services this date to assess swallow function s/p admission for stroke-like symptoms.  Per MD note pt is on puree solids at SNF and TF at night.  Pt was unable to provide info on if he was on thin or thickened liquids.  SLP provided full feeding assist to pt and assessed oropharyngeal swallow function w/puree solids and thin and NTL.  Pt consumed puree solids w/no difficulty.  Pt consumed ice chips w/no overt s/s of aspiration.  Pt consumed thin liquids via spoon w/throat clearing and extremely weak and unproductive cough.  Pt consumed NTL w/throat clear x2.  SLP recommends advancing pt's diet to puree/NTL at this time w/1-2 additional f/u sessions for diet safety and tolerance and trials of thin liquids as able.  SLP educated pt on results and recommendations and pt was in agreement.  -CK                Pharyngeal Phase Concerns    Pharyngeal Phase Concerns -- cough;throat clear  -CK       Cough -- thin  -CK       Throat Clear -- thin  -CK                Swallowing Quality of Life Assessment    Education and counseling provided -- Signs of aspiration;Risks of aspiration;Safest diet  options;Aspiration precautions;Compensatory strategy recommendations and rationale;Feeding assistance and techniques  -CK                SLP Evaluation Clinical Impression    SLP Swallowing Diagnosis -- mild-moderate;oral dysphagia;suspected pharyngeal dysphagia  -CK       Functional Impact -- risk of aspiration/pneumonia;risk of malnutrition;risk of dehydration  -CK       Rehab Potential/Prognosis, Swallowing -- adequate, monitor progress closely  -CK       Swallow Criteria for Skilled Therapeutic Interventions Met -- demonstrates skilled criteria  -CK                SLP Treatment Clinical Impressions    Treatment Assessment (SLP) ST: pt tolerates NTL and puree with no s/s of aspiration.  recommend continue NTL with no straw with TF at noc per baseline from SNF.  d/c skilled ST at this time.  -EC --       Daily Summary of Progress (SLP) progress toward functional goals as expected  -EC --       Plan for Continued Treatment (SLP) continue treatment per plan of care  -EC --       Care Plan Review evaluation/treatment results reviewed;care plan/treatment goals reviewed;risks/benefits reviewed;current/potential barriers reviewed;patient/other agree to care plan  -EC evaluation/treatment results reviewed;care plan/treatment goals reviewed;risks/benefits reviewed;current/potential barriers reviewed;patient/other agree to care plan  -CK                Recommendations    Therapy Frequency (Swallow) 1 day per week;2 days per week  -EC 1 day per week;2 days per week  -CK       Predicted Duration Therapy Intervention (Days) 1 day;3 days  -EC 1 day;3 days  -CK       SLP Diet Recommendation puree;nectar thick liquids  -EC puree;nectar thick liquids  -CK       Recommended Precautions and Strategies upright posture during/after eating;small bites of food and sips of liquid;no straw;liquid via spoon only;general aspiration precautions;fatigue precautions;1:1 supervision;assist with feeding  -EC upright posture during/after  eating;small bites of food and sips of liquid;no straw;liquid via spoon only;general aspiration precautions;fatigue precautions;1:1 supervision;assist with feeding  -CK       SLP Rec. for Method of Medication Administration meds crushed;with pudding or applesauce  -EC meds crushed;with pudding or applesauce  -CK       Monitor for Signs of Aspiration yes;notify SLP if any concerns  -EC yes;notify SLP if any concerns  -CK       Anticipated Discharge Disposition (SLP) skilled nursing facility  -EC skilled nursing facility  -CK                Swallow Goals (SLP)    Swallow LTGs Patient will demonstrate functional swallow for  -EC Patient will demonstrate functional swallow for  -CK       Swallow STGs diet tolerance goal selection (SLP)  -EC diet tolerance goal selection (SLP)  -CK       Diet Tolerance Goal Selection (SLP) Patient will tolerate trials of  -EC Patient will tolerate trials of  -CK                (LTG) Patient will demonstrate functional swallow for    Diet Texture (Demonstrate functional swallow) pureed textures  -EC pureed textures  -CK       [place] (Demonstrate functional swallow) with 1:1 assist/ supervision  -EC with 1:1 assist/ supervision  -CK       Time Frame (Demonstrate functional swallow) by discharge  -EC by discharge  -CK       Barriers (Demonstrate functional swallow) previous deficits  -EC previous deficits  -CK       Progress/Outcomes (Demonstrate functional swallow) goal met  -EC new goal  -CK                (STG) Patient will tolerate trials of    Consistencies Trialed (Tolerate trials) pureed textures;nectar/ mildly thick liquids  -EC pureed textures;thin liquids;nectar/ mildly thick liquids  -CK       Desired Outcome (Tolerate trials) without signs/symptoms of aspiration;with adequate oral prep/transit/clearance  -EC without signs/symptoms of aspiration;with adequate oral prep/transit/clearance  -CK       [place] (Tolerate trials) with 1:1 assist/ supervision  -EC with 1:1  assist/ supervision  -CK       Time Frame (Tolerate trials) 1 week  -EC 1 week  -CK       Progress/Outcomes (Tolerate trials) goal met  -EC new goal  -CK             User Key  (r) = Recorded By, (t) = Taken By, (c) = Cosigned By    Initials Name Effective Dates    Yadi Hall CCC-SLP 06/16/21 -     Simran Villatoro, MS CCC-SLP 06/16/21 -                 EDUCATION  The patient has been educated in the following areas:   Dysphagia (Swallowing Impairment) Modified Diet Instruction.        SLP GOALS     Row Name 08/11/22 0818 08/10/22 1312          (LTG) Patient will demonstrate functional swallow for    Diet Texture (Demonstrate functional swallow) pureed textures  -EC pureed textures  -CK     [place] (Demonstrate functional swallow) with 1:1 assist/ supervision  -EC with 1:1 assist/ supervision  -CK     Time Frame (Demonstrate functional swallow) by discharge  -EC by discharge  -CK     Barriers (Demonstrate functional swallow) previous deficits  -EC previous deficits  -CK     Progress/Outcomes (Demonstrate functional swallow) goal met  -EC new goal  -CK            (STG) Patient will tolerate trials of    Consistencies Trialed (Tolerate trials) pureed textures;nectar/ mildly thick liquids  -EC pureed textures;thin liquids;nectar/ mildly thick liquids  -CK     Desired Outcome (Tolerate trials) without signs/symptoms of aspiration;with adequate oral prep/transit/clearance  -EC without signs/symptoms of aspiration;with adequate oral prep/transit/clearance  -CK     [place] (Tolerate trials) with 1:1 assist/ supervision  -EC with 1:1 assist/ supervision  -CK     Time Frame (Tolerate trials) 1 week  -EC 1 week  -CK     Progress/Outcomes (Tolerate trials) goal met  -EC new goal  -CK           User Key  (r) = Recorded By, (t) = Taken By, (c) = Cosigned By    Initials Name Provider Type    Yadi Hall CCC-SLP Speech and Language Pathologist    Simran Villatoro, MS CCC-SLP Speech and  Language Pathologist                   Time Calculation:    Time Calculation- SLP     Row Name 08/11/22 0944             Time Calculation- SLP    SLP Start Time 0818  -EC      SLP Stop Time 0841  -EC      SLP Time Calculation (min) 23 min  -EC      Total Timed Code Minutes- SLP 23 minute(s)  -EC      SLP Received On 08/11/22  -EC      SLP Goal Re-Cert Due Date 08/24/22  -EC              Untimed Charges    [id number] Treatment Swallow Minutes 23  -EC              Total Minutes    Untimed Charges Total Minutes 23  -EC       Total Minutes 23  -EC            User Key  (r) = Recorded By, (t) = Taken By, (c) = Cosigned By    Initials Name Provider Type    EC Yadi Guzman CCC-SLP Speech and Language Pathologist                Therapy Charges for Today     Code Description Service Date Service Provider Modifiers Qty    [id number] HC ST TREATMENT SWALLOW 2 8/11/2022 Yadi Guzman CCC-SLP GN 1               SYDNEY Ordonez  8/11/2022

## 2022-08-11 NOTE — PROGRESS NOTES
"Adult Nutrition  Assessment    Patient Name:  Fer Ivan  YOB: 1950  MRN: [id number]  Admit Date:  8/9/2022    Assessment Date:  8/11/2022    Comments:  Pt continues treatment for sepsis and acute renal failure. RN states clifton was removed w/ a large amount of blood and pus noted on admit. Pt w/ PEG placement 6/2022- seems to receive HS feeding w/ po at NH. SLP has placed pt on pureed w/ NTL- 25% at breakfast. Levo has been on hold all morning. 8/10 153# w/ BMI 19.6. RD to adjust recommendations to only 16hr/day w/ po intake--rec Isosource 1.5 to goal rate 60ml/hr [id number] and flush 25ml/hr x16hrs (+400ml) to provide 1440cal, 65g pro and 729ml (+400ml flush) = 1129ml fluids/day. RD to follow hospital course.      Reason for Assessment     Row Name 08/11/22 1223          Reason for Assessment    Reason For Assessment follow-up protocol     Diagnosis infection/sepsis;renal disease     Identified At Risk by Screening Criteria tube feeding or parenteral nutrition;difficulty chewing/swallowing                Nutrition/Diet History     Row Name 08/11/22 1223          Nutrition/Diet History    Typical Intake (Food/Fluid/EN/PN) RN notes pt \"doesnt like his diet but is very hungry\"- she reports MD has given ok to start hs TF                Anthropometrics     Row Name 08/11/22 0600          Anthropometrics    Height 188 cm (74\")     Weight 73 kg (160 lb 15 oz)                    Estimated/Assessed Needs - Anthropometrics     Row Name 08/11/22 0600          Anthropometrics    Height 188 cm (74\")     Weight 73 kg (160 lb 15 oz)                         Electronically signed by:  Deanne Fuentes RD  08/11/22 12:27 CDT  "

## 2022-08-11 NOTE — PROGRESS NOTES
"[facility]  [address] [place] [address]. [address]  T - [phone]  F - [phone]     Progress Note          PATIENT  DEMOGRAPHICS   PATIENT NAME: Fer Ivan                      PHYSICIAN: Tari Smalls MD  : 1950  MRN: [id number]   LOS: 2 days    Patient Care Team:  Estrada Fuentes MD as PCP - General (Family Medicine)  Subjective   SUBJECTIVE   Feels well, no soa, good UO.          Objective   OBJECTIVE   Vital Signs  Temp:  [97.8 °F (36.6 °C)-98.5 °F (36.9 °C)] 98.5 °F (36.9 °C)  Heart Rate:  [] 103  Resp:  [16-22] 16  BP: ()/(50-69) 103/58    Flowsheet Rows    Flowsheet Row First Filed Value   Admission Height 188 cm (74\") Documented at 2022 1045   Admission Weight 73.9 kg (163 lb) Documented at 2022 1045           I/O last 3 completed shifts:  In: 3443.7 [P.O.:350; I.V.:2993.7; IV Piggyback:100]  Out: 3200 [Urine:3200]    PHYSICAL EXAM    Physical Exam  Constitutional:       Appearance: He is well-developed.   HENT:      Head: Normocephalic.   Eyes:      Pupils: Pupils are equal, round, and reactive to light.   Cardiovascular:      Rate and Rhythm: Normal rate and regular rhythm.      Heart sounds: Normal heart sounds.   Pulmonary:      Effort: Pulmonary effort is normal.      Breath sounds: Normal breath sounds.   Abdominal:      General: Bowel sounds are normal.      Palpations: Abdomen is soft.   Musculoskeletal:         General: No swelling.   Skin:     Coloration: Skin is not jaundiced.   Neurological:      General: No focal deficit present.      Mental Status: He is alert and oriented to person, place, and time.      Motor: Weakness present.         RESULTS   Results Review:    Results from last 7 days   Lab Units 22  0617 08/10/22  0242 22  1721 22  1257 22  1130   SODIUM mmol/L 144 140 139  --  138   POTASSIUM mmol/L 4.5 4.2 5.3*   < > 6.4*   CHLORIDE mmol/L 109* 104 105  --  96*   CO2 mmol/L 27.0 25.0 20.0*  --  " 19.0*   BUN mg/dL 43* 68* 72*  --  78*   CREATININE mg/dL 0.83 1.87* 2.55*  --  3.46*   CALCIUM mg/dL 8.5* 8.3* 8.2*  --  10.0   BILIRUBIN mg/dL  --   --   --   --  0.7   ALK PHOS U/L  --   --   --   --  139*   ALT (SGPT) U/L  --   --   --   --  40   AST (SGOT) U/L  --   --   --   --  30   GLUCOSE mg/dL 98 163* 197*  --  210*    < > = values in this interval not displayed.       Estimated Creatinine Clearance: 83.1 mL/min (by C-G formula based on SCr of 0.83 mg/dL).    Results from last 7 days   Lab Units 08/09/22  1130   MAGNESIUM mg/dL 2.7*             Results from last 7 days   Lab Units 08/11/22  0617 08/10/22  0242 08/09/22  1130   WBC 10*3/mm3 13.32* 22.25* 31.33*   HEMOGLOBIN g/dL 9.6* 10.1* 14.4   PLATELETS 10*3/mm3 180 244 424       Results from last 7 days   Lab Units 08/09/22  1129   INR  1.30*         Imaging Results (Last 24 Hours)     ** No results found for the last 24 hours. **           MEDICATIONS    FLUoxetine, 20 mg, Oral, Daily  heparin (porcine), 5,000 Units, Subcutaneous, Q8H  meropenem, 1 g, Intravenous, Q12H  metoclopramide, 5 mg, Oral, 4x Daily AC & at Bedtime  midodrine, 5 mg, Oral, TID AC  pantoprazole, 40 mg, Intravenous, Q AM  sodium chloride, 10 mL, Intravenous, Q12H      norepinephrine, 0.02-0.3 mcg/kg/min, Last Rate: Stopped (08/11/22 0400)  sodium chloride, 100 mL/hr, Last Rate: 100 mL/hr (08/11/22 0242)        Assessment & Plan   ASSESSMENT / PLAN      Sepsis with acute renal failure without septic shock, due to unspecified organism, unspecified acute renal failure type (HCC)    1.  NELLIE- Baseline creatinine less than 1, peak up to 3.46.  Likely has prerenal NELLIE secondary to sepsis and hypotension.  He has received sepsis fluid bolus, Levophed and iv sodium bicarbonate drip.      Ct showed distended bladder and bilateral hydroureteronephrosis - Urology consulted for possible need of SPC due to neurogenic bladder     Cr <1 and will stop ivf. Sign off here      2.  Hyperkalemia-  improved     3.  Sepsis with septic shock- manage per primary team, possible urinary source on merrem     4.  Metabolic acidosis- corrected and change to NS     5.  UTI- manage per primary team     6.  Recent COVID                This document has been electronically signed by Tari Smalls MD on August 11, 2022 11:20 CDT

## 2022-08-11 NOTE — PROGRESS NOTES
[facility] Medicine Services  INPATIENT PROGRESS NOTE    Length of Stay: 1  Date of Admission: 8/9/2022  Primary Care Physician: Estrada Fuentes MD    Subjective   Chief Complaint: confusion   HPI:     He looks better today. He is more responsive. Awake and alert.    Review of Systems     All pertinent negatives and positives are as above. All other systems have been reviewed and are negative unless otherwise stated.     Objective    Temp:  [98.3 °F (36.8 °C)-99.9 °F (37.7 °C)] 98.8 °F (37.1 °C)  Heart Rate:  [102-130] 107  Resp:  [20-22] 22  BP: ()/(50-75) 120/58    Physical Exam  Vitals reviewed.   Constitutional:       General: He is not in acute distress.     Appearance: He is well-developed.   HENT:      Head: Normocephalic and atraumatic.      Nose: Nose normal.   Eyes:      Conjunctiva/sclera: Conjunctivae normal.   Cardiovascular:      Rate and Rhythm: Normal rate and regular rhythm.   Pulmonary:      Effort: Pulmonary effort is normal. No respiratory distress.      Breath sounds: Normal breath sounds. No wheezing or rales.   Musculoskeletal:         General: Normal range of motion.      Cervical back: Normal range of motion and neck supple.   Skin:     General: Skin is warm and dry.             Results Review:  I have reviewed the labs, radiology results, and diagnostic studies.    Laboratory Data:   Results from last 7 days   Lab Units 08/10/22  0242 08/09/22  1721 08/09/22  1257 08/09/22  1130   SODIUM mmol/L 140 139  --  138   POTASSIUM mmol/L 4.2 5.3* 5.9* 6.4*   CHLORIDE mmol/L 104 105  --  96*   CO2 mmol/L 25.0 20.0*  --  19.0*   BUN mg/dL 68* 72*  --  78*   CREATININE mg/dL 1.87* 2.55*  --  3.46*   GLUCOSE mg/dL 163* 197*  --  210*   CALCIUM mg/dL 8.3* 8.2*  --  10.0   BILIRUBIN mg/dL  --   --   --  0.7   ALK PHOS U/L  --   --   --  139*   ALT (SGPT) U/L  --   --   --  40   AST (SGOT) U/L  --   --   --  30   ANION GAP mmol/L 11.0 14.0  --  23.0*      Estimated Creatinine Clearance: 35.1 mL/min (A) (by C-G formula based on SCr of 1.87 mg/dL (H)).  Results from last 7 days   Lab Units 08/09/22  1130   MAGNESIUM mg/dL 2.7*         Results from last 7 days   Lab Units 08/10/22  0242 08/09/22  1130   WBC 10*3/mm3 22.25* 31.33*   HEMOGLOBIN g/dL 10.1* 14.4   HEMATOCRIT % 30.4* 43.7   PLATELETS 10*3/mm3 244 424     Results from last 7 days   Lab Units 08/09/22  1129   INR  1.30*       Culture Data:   Blood Culture   Date Value Ref Range Status   08/09/2022 No growth at 24 hours  Preliminary   08/09/2022 Abnormal Stain (C)  Preliminary     Urine Culture   Date Value Ref Range Status   08/09/2022 >100,000 CFU/mL Gram Negative Bacilli (A)  Preliminary     No results found for: RESPCX  No results found for: WOUNDCX  No results found for: STOOLCX  No components found for: BODYFLD    Radiology Data:   Imaging Results (Last 24 Hours)     ** No results found for the last 24 hours. **          I have reviewed the patient current medications.     Assessment/Plan     Active Hospital Problems    Diagnosis  POA   • Sepsis with acute renal failure without septic shock, due to unspecified organism, unspecified acute renal failure type (HCC) [A41.9, R65.20, N17.9]  Yes       Plan:        Septic shock  -IV antibiotics  -IVF  -IV pressors, wean as tolerated   -CXR reviewed  -reviewed CT scan      Acute Renal Failure  -nephrology consulted  -f/u imaging and urine studies     Metabolic acidosis  -bicarb drip     Urinary retention  -clifton in place  -Dr. Matthews consulted     Prognosis is guarded.                 Discharge Planning: in progress    I confirmed that the patient's Advance Care Plan is present, code status is documented, or surrogate decision maker is listed in the patient's medical record.           This document has been electronically signed by Yadi Knutson MD on August 10, 2022 19:30 CDT

## 2022-08-11 NOTE — PLAN OF CARE
Goal Outcome Evaluation:  Plan of Care Reviewed With: patient        Progress: improving  Outcome Evaluation: ST: pt consumed entire coke and serving of ice cream.  he took meds whole with coke.  he took one bite of chewable solids, and c/o and attempted to spit it out.  clinician informed pt he would not get more coke if he spit it out.  pt is appropriate for diet at this time, but getting him to eat it may be difficult given his defiant nature while in the hospital.  SLP will f/u for diet tolerance.

## 2022-08-11 NOTE — THERAPY DISCHARGE NOTE
Acute Care - Speech Language Pathology   Swallow Treatment Note/Discharge [facility]     Patient Name: Fer Ivan  : 1950  MRN: [id number]  Today's Date: 2022               Admit Date: 2022    Visit Dx:    ICD-10-CM ICD-9-CM   1. Sepsis with acute renal failure without septic shock, due to unspecified organism, unspecified acute renal failure type (HCC)  A41.9 038.9    R65.20 995.92    N17.9 584.9   2. Acute renal failure, unspecified acute renal failure type (HCC)  N17.9 584.9   3. Hyperkalemia  E87.5 276.7   4. Urinary tract infection with hematuria, site unspecified  N39.0 599.0    R31.9 599.70   5. Hypotension, unspecified hypotension type  I95.9 458.9   6. Oropharyngeal dysphagia  R13.12 787.22     Patient Active Problem List   Diagnosis   • Sepsis (HCC)   • Sepsis, due to unspecified organism, unspecified whether acute organ dysfunction present (HCC)   • Hematemesis with nausea   • Epigastric pain   • Absolute anemia   • Urinary tract infection associated with indwelling urethral catheter (HCC)   • Urinary retention   • Sepsis with acute renal failure without septic shock, due to unspecified organism, unspecified acute renal failure type (HCC)     Past Medical History:   Diagnosis Date   • Stroke (HCC)      Past Surgical History:   Procedure Laterality Date   • COLONOSCOPY N/A 6/15/2022    Procedure: COLONOSCOPY;  Surgeon: Karen Kaye MD;  Location: [facility] ENDOSCOPY;  Service: Gastroenterology;  Laterality: N/A;   • ENDOSCOPY N/A 6/15/2022    Procedure: ESOPHAGOGASTRODUODENOSCOPY;  Surgeon: Karen Kaye MD;  Location: [facility] ENDOSCOPY;  Service: Gastroenterology;  Laterality: N/A;   • PEG TUBE INSERTION Left        SLP Recommendation and Plan     SLP Diet Recommendation: puree, nectar thick liquids (22)     Monitor for Signs of Aspiration: yes, notify SLP if any concerns (22)        Anticipated Discharge Disposition (SLP): skilled nursing facility  (08/11/22 0818)     Therapy Frequency (Swallow): 1 day per week, 2 days per week (08/11/22 0818)  Predicted Duration Therapy Intervention (Days): 1 day, 3 days (08/11/22 0818)     Daily Summary of Progress (SLP): progress toward functional goals as expected (08/11/22 0818)     Anticipated Discharge Disposition (SLP): skilled nursing facility (08/11/22 0818)              Treatment Assessment (SLP): ST: pt tolerates NTL and puree with no s/s of aspiration.  recommend continue NTL with no straw with TF at noc per baseline from SNF.  d/c skilled ST at this time. (08/11/22 0818)  Plan for Continued Treatment (SLP): treatment no longer indicated as all goals met (08/11/22 0818)    Plan of Care Reviewed With: patient (08/11/22 1225)  Progress: improving (08/11/22 1225)  Outcome Evaluation: ST: pt tolerates puree and NTL via cup rim.  He will benefit fromcontinued NTL with no straw to decrease risk of aspiration.  pt will begin TF at noc per PLOF. d/c skilled ST with goals met. (08/11/22 1225)    SWALLOW EVALUATION (last 72 hours)     SLP Adult Swallow Evaluation     Row Name 08/11/22 0818 08/10/22 1312                Rehab Evaluation    Document Type therapy note (daily note)  -EC evaluation  -CK       Total Evaluation Minutes, SLP -- 48  -CK       Subjective Information no complaints  -EC --       Patient Observations alert;cooperative;agree to therapy  -EC --       Patient/Family/Caregiver Comments/Observations none  -EC --       Patient Effort good  -EC --                General Information    Patient Profile Reviewed yes  -EC --       Current Method of Nutrition pureed;nectar/syrup-thick liquids  -EC NPO;gastrostomy feedings  -CK       Precautions/Limitations, Vision WFL;for purposes of eval  -EC --       Precautions/Limitations, Hearing WFL;for purposes of eval  -EC --       Prior Level of Function-Communication receptive language impairment;expressive language impairment;cognitive-linguistic impairment  -EC receptive  language impairment;expressive language impairment;cognitive-linguistic impairment  -CK       Prior Level of Function-Swallowing puree  -EC puree  -CK       Plans/Goals Discussed with patient;agreed upon  -EC patient;agreed upon  -CK       Barriers to Rehab cognitive status;previous functional deficit  -EC cognitive status;previous functional deficit  -CK       Patient's Goals for Discharge patient did not state  -EC patient did not state  -CK                Pain    Additional Documentation -- Pain Scale: Numbers Pre/Post-Treatment (Group)  -CK                Pain Scale: Numbers Pre/Post-Treatment    Pretreatment Pain Rating 0/10 - no pain  -EC 0/10 - no pain  -CK       Posttreatment Pain Rating 0/10 - no pain  -EC 0/10 - no pain  -CK                Oral Motor Structure and Function    Dentition Assessment edentulous, does not have dentures  -EC edentulous, does not have dentures  -CK       Secretion Management WNL/WFL  -EC WNL/WFL  -CK       Mucosal Quality dry  -EC dry  -CK       Volitional Swallow weak  -EC unable to elicit  -CK       Volitional Cough non-productive;weak;reduced respiratory support  -EC non-productive;weak;reduced respiratory support  -CK                General Eating/Swallowing Observations    Respiratory Support Currently in Use nasal cannula  -EC nasal cannula  -CK       Eating/Swallowing Skills fed by SLP  -EC fed by SLP  -CK       Positioning During Eating upright 90 degree;upright in bed  -EC upright 90 degree;upright in bed  -CK       Utensils Used spoon;cup  -EC spoon  -CK       Consistencies Trialed pureed;nectar/syrup-thick liquids  -EC thin liquids;ice chips;pureed  -CK       Pre SpO2 (%) -- 99  -CK       Post SpO2 (%) -- 97  -CK                Clinical Swallow Eval    Oral Prep Phase WFL  for puree  -EC WFL  for puree  -CK       Oral Transit WFL  for puree  -EC WFL  for puree  -CK       Oral Residue WFL  for puree  -EC WFL  for puree  -CK       Pharyngeal Phase suspected pharyngeal  impairment  -EC suspected pharyngeal impairment  -CK       Esophageal Phase unremarkable  -EC unremarkable  -CK       Clinical Swallow Evaluation Summary pt alert and tolerates puree solids with good oral clearance and timely swallow.  pt tolerates NTL via cup rim.  pt does tend to attempt consecutive drinks from cup rim. and not as aware of safety concerns with liquids.  -EC Pt seen for skilled ST services this date to assess swallow function s/p admission for stroke-like symptoms.  Per MD note pt is on puree solids at SNF and TF at night.  Pt was unable to provide info on if he was on thin or thickened liquids.  SLP provided full feeding assist to pt and assessed oropharyngeal swallow function w/puree solids and thin and NTL.  Pt consumed puree solids w/no difficulty.  Pt consumed ice chips w/no overt s/s of aspiration.  Pt consumed thin liquids via spoon w/throat clearing and extremely weak and unproductive cough.  Pt consumed NTL w/throat clear x2.  SLP recommends advancing pt's diet to puree/NTL at this time w/1-2 additional f/u sessions for diet safety and tolerance and trials of thin liquids as able.  SLP educated pt on results and recommendations and pt was in agreement.  -CK                Pharyngeal Phase Concerns    Pharyngeal Phase Concerns -- cough;throat clear  -CK       Cough -- thin  -CK       Throat Clear -- thin  -CK                Swallowing Quality of Life Assessment    Education and counseling provided -- Signs of aspiration;Risks of aspiration;Safest diet options;Aspiration precautions;Compensatory strategy recommendations and rationale;Feeding assistance and techniques  -CK                SLP Evaluation Clinical Impression    SLP Swallowing Diagnosis -- mild-moderate;oral dysphagia;suspected pharyngeal dysphagia  -CK       Functional Impact -- risk of aspiration/pneumonia;risk of malnutrition;risk of dehydration  -CK       Rehab Potential/Prognosis, Swallowing -- adequate, monitor progress  closely  -CK       Swallow Criteria for Skilled Therapeutic Interventions Met -- demonstrates skilled criteria  -CK                SLP Treatment Clinical Impressions    Treatment Assessment (SLP) ST: pt tolerates NTL and puree with no s/s of aspiration.  recommend continue NTL with no straw with TF at noc per baseline from SNF.  d/c skilled ST at this time.  -EC --       Daily Summary of Progress (SLP) progress toward functional goals as expected  -EC --       Plan for Continued Treatment (SLP) treatment no longer indicated as all goals met  -EC --       Care Plan Review evaluation/treatment results reviewed;care plan/treatment goals reviewed;risks/benefits reviewed;current/potential barriers reviewed;patient/other agree to care plan  -EC evaluation/treatment results reviewed;care plan/treatment goals reviewed;risks/benefits reviewed;current/potential barriers reviewed;patient/other agree to care plan  -CK                Recommendations    Therapy Frequency (Swallow) 1 day per week;2 days per week  -EC 1 day per week;2 days per week  -CK       Predicted Duration Therapy Intervention (Days) 1 day;3 days  -EC 1 day;3 days  -CK       SLP Diet Recommendation puree;nectar thick liquids  -EC puree;nectar thick liquids  -CK       Recommended Precautions and Strategies upright posture during/after eating;small bites of food and sips of liquid;no straw;liquid via spoon only;general aspiration precautions;fatigue precautions;1:1 supervision;assist with feeding  -EC upright posture during/after eating;small bites of food and sips of liquid;no straw;liquid via spoon only;general aspiration precautions;fatigue precautions;1:1 supervision;assist with feeding  -CK       SLP Rec. for Method of Medication Administration meds crushed;with pudding or applesauce  -EC meds crushed;with pudding or applesauce  -CK       Monitor for Signs of Aspiration yes;notify SLP if any concerns  -EC yes;notify SLP if any concerns  -CK        Anticipated Discharge Disposition (SLP) skilled nursing facility  -EC skilled nursing facility  -CK                Swallow Goals (SLP)    Swallow LTGs Patient will demonstrate functional swallow for  -EC Patient will demonstrate functional swallow for  -CK       Swallow STGs diet tolerance goal selection (SLP)  -EC diet tolerance goal selection (SLP)  -CK       Diet Tolerance Goal Selection (SLP) Patient will tolerate trials of  -EC Patient will tolerate trials of  -CK                (LTG) Patient will demonstrate functional swallow for    Diet Texture (Demonstrate functional swallow) pureed textures  -EC pureed textures  -CK       [place] (Demonstrate functional swallow) with 1:1 assist/ supervision  -EC with 1:1 assist/ supervision  -CK       Time Frame (Demonstrate functional swallow) by discharge  -EC by discharge  -CK       Barriers (Demonstrate functional swallow) previous deficits  -EC previous deficits  -CK       Progress/Outcomes (Demonstrate functional swallow) goal met  -EC new goal  -CK                (STG) Patient will tolerate trials of    Consistencies Trialed (Tolerate trials) pureed textures;nectar/ mildly thick liquids  -EC pureed textures;thin liquids;nectar/ mildly thick liquids  -CK       Desired Outcome (Tolerate trials) without signs/symptoms of aspiration;with adequate oral prep/transit/clearance  -EC without signs/symptoms of aspiration;with adequate oral prep/transit/clearance  -CK       [place] (Tolerate trials) with 1:1 assist/ supervision  -EC with 1:1 assist/ supervision  -CK       Time Frame (Tolerate trials) 1 week  -EC 1 week  -CK       Progress/Outcomes (Tolerate trials) goal met  -EC new goal  -CK             User Key  (r) = Recorded By, (t) = Taken By, (c) = Cosigned By    Initials Name Effective Dates    Yadi Hall CCC-SLP 06/16/21 -     CK Simran Colby MS CCC-SLP 06/16/21 -                 EDUCATION  The patient has been educated in the following  areas:   Dysphagia (Swallowing Impairment) Modified Diet Instruction.         SLP GOALS     Row Name 08/11/22 0818 08/10/22 1312          (LTG) Patient will demonstrate functional swallow for    Diet Texture (Demonstrate functional swallow) pureed textures  -EC pureed textures  -CK     [place] (Demonstrate functional swallow) with 1:1 assist/ supervision  -EC with 1:1 assist/ supervision  -CK     Time Frame (Demonstrate functional swallow) by discharge  -EC by discharge  -CK     Barriers (Demonstrate functional swallow) previous deficits  -EC previous deficits  -CK     Progress/Outcomes (Demonstrate functional swallow) goal met  -EC new goal  -CK            (STG) Patient will tolerate trials of    Consistencies Trialed (Tolerate trials) pureed textures;nectar/ mildly thick liquids  -EC pureed textures;thin liquids;nectar/ mildly thick liquids  -CK     Desired Outcome (Tolerate trials) without signs/symptoms of aspiration;with adequate oral prep/transit/clearance  -EC without signs/symptoms of aspiration;with adequate oral prep/transit/clearance  -CK     [place] (Tolerate trials) with 1:1 assist/ supervision  -EC with 1:1 assist/ supervision  -CK     Time Frame (Tolerate trials) 1 week  -EC 1 week  -CK     Progress/Outcomes (Tolerate trials) goal met  -EC new goal  -CK           User Key  (r) = Recorded By, (t) = Taken By, (c) = Cosigned By    Initials Name Provider Type    Yadi Hall CCC-SLP Speech and Language Pathologist    CK Simran Colby MS CCC-SLP Speech and Language Pathologist                     Time Calculation:    Time Calculation- SLP     Row Name 08/11/22 0944             Time Calculation- SLP    SLP Start Time 0818  -EC      SLP Stop Time 0841  -EC      SLP Time Calculation (min) 23 min  -EC      Total Timed Code Minutes- SLP 23 minute(s)  -EC      SLP Received On 08/11/22  -EC      SLP Goal Re-Cert Due Date 08/24/22  -EC              Untimed Charges    55034-UM Treatment  Swallow Minutes 23  -EC              Total Minutes    Untimed Charges Total Minutes 23  -EC       Total Minutes 23  -EC            User Key  (r) = Recorded By, (t) = Taken By, (c) = Cosigned By    Initials Name Provider Type    EC Yadi Guzman CCC-SLP Speech and Language Pathologist                Therapy Charges for Today     Code Description Service Date Service Provider Modifiers Qty    [id number] HC ST TREATMENT SWALLOW 2 8/11/2022 Yadi Guzman CCC-SLP GN 1               SLP Discharge Summary  Anticipated Discharge Disposition (SLP): skilled nursing facility    SYDNEY Ordonez  8/11/2022

## 2022-08-11 NOTE — PLAN OF CARE
Goal Outcome Evaluation:   Patient resting in bed on 2L NC. VSS at this time. Levo gtt on hold. Hester with adequate UOP. BM x1 this shift. Continuing to monitor.

## 2022-08-11 NOTE — CONSULTS
"[facility]   Consult Note    Patient Name: Fer Ivan  : 1950  MRN: [id number]  Primary Care Physician:  Estrada Fuentes MD  Referring Physician: James Armstrong MD  Date of admission: 2022    Inpatient Urology Consult  Consult performed by: Rolf Bianchi APRN  Consult ordered by: Tari Smalls MD        Subjective   Subjective     Reason for Consult/ Chief Complaint: \"liz recurrent uti post CVA, neurogenic bladder chronic clifton, may need long term plan ?SPC\", retention of urine    History of Present Illness  Fer Ivan is a 72 y.o. male consulted to Urology for retention of urine from NGB after CVA, admitted for sepsis from UTI. Had a Clifton originally inserted 2 months ago with previous admission with plans to change monthly. Has been with hospice care and that was revoked and patient was brought in via EMS (resides at [facility]) due to confusion, acute kidney injury, sepsis with shock due to UTI. Imaging, CT abdomen/pelvis wo contrast on 22,  findings were bilateral mild hydroureteronephrosis from chronic JONES, distended bladder. Clifton was changed out at that time, nursing says Clifton is draining without problems and renal function is improving. His nurse today reports that large amount of purulent urethral drainage when old Clifton removed. Urine and blood cultures Proteus and Enterobacter spp.     Review of Systems   Unable to perform ROS: Mental status change        Personal History     Past Medical History:   Diagnosis Date   • Stroke (HCC)        Past Surgical History:   Procedure Laterality Date   • COLONOSCOPY N/A 6/15/2022    Procedure: COLONOSCOPY;  Surgeon: Karen Kaye MD;  Location: [facility] ENDOSCOPY;  Service: Gastroenterology;  Laterality: N/A;   • ENDOSCOPY N/A 6/15/2022    Procedure: ESOPHAGOGASTRODUODENOSCOPY;  Surgeon: Karen Kaye MD;  Location: [facility] ENDOSCOPY;  Service: Gastroenterology;  Laterality: N/A;   • PEG TUBE INSERTION Left "        Family History: family history is not on file. Otherwise pertinent FHx was reviewed and not pertinent to current issue.    Social History:  reports that he has never smoked. He has never used smokeless tobacco. He reports that he does not drink alcohol and does not use drugs.    Home Medications:   FLUoxetine, HYDROcodone-acetaminophen, Melatonin, Omeprazole, baclofen, docusate sodium, gabapentin, lactulose, magnesium hydroxide, metoclopramide, polyethylene glycol, and rosuvastatin    Allergies:  No Known Allergies    Objective    Objective     Vitals:  Temp:  [97.8 °F (36.6 °C)-98.5 °F (36.9 °C)] 98.5 °F (36.9 °C)  Heart Rate:  [] 104  Resp:  [16-22] 16  BP: ()/(50-68) 98/58  Flow (L/min):  [2-3] 2    Physical Exam  Exam conducted with a chaperone present (his RN).   Constitutional:       Appearance: He is ill-appearing.   HENT:      Head: Normocephalic and atraumatic.      Right Ear: External ear normal.      Left Ear: External ear normal.      Mouth/Throat:      Mouth: Mucous membranes are dry.   Eyes:      General:         Right eye: No discharge.         Left eye: No discharge.      Pupils: Pupils are equal, round, and reactive to light.   Cardiovascular:      Pulses: Normal pulses.   Abdominal:      General: There is no distension.      Palpations: Abdomen is soft.      Tenderness: There is no abdominal tenderness.   Genitourinary:     Pubic Area: No rash.       Penis: Paraphimosis, tenderness and swelling present. No erythema or discharge.       Testes:         Right: Tenderness or swelling not present.         Left: Tenderness or swelling not present.   Musculoskeletal:         General: No swelling, tenderness or signs of injury.   Skin:     General: Skin is warm and dry.      Coloration: Skin is pale.   Neurological:      Mental Status: He is alert. He is disoriented.   Psychiatric:         Mood and Affect: Mood normal.         Behavior: Behavior normal.         Thought Content: Thought  content normal.         Result Review    Result Review:  I have personally reviewed the results from the time of this admission to 8/11/2022 16:05 CDT and agree with these findings:  [x]  Laboratory list / accordion  [x]  Microbiology  [x]  Radiology  []  EKG/Telemetry   []  Cardiology/Vascular   []  Pathology  []  Old records  []  Other:  Most notable findings include:     Imaging Results (Last 72 Hours)     Procedure Component Value Units Date/Time    CT Abdomen Pelvis Without Contrast [[id number]] Collected: 08/09/22 1523     Updated: 08/09/22 1541    Narrative:      EXAM:  CT ABDOMEN PELVIS WITHOUT IV CONTRAST    ORDERING PROVIDER:  VESTA BEEBE    CLINICAL HISTORY:  Sepsis, abscess    COMPARISON:      TECHNIQUE:   CT abdomen and pelvis performed without IV or oral contrast,  reformatted in the sagittal and coronal planes.     This examination was performed according to our departmental dose  optimization program which includes automated exposure control,  adjustment of the MA and kV according to patient size, and/or use  of iterative reconstruction technique.    FINDINGS:    BASILAR CHEST: Mild to moderate left pleural effusion. Mild  consolidation in the lung bases on the left side and to a lesser  extent on the right.    LIVER: No mass, enlargement or abnormal density.    BILIARY TRACT: Unremarkable gallbladder.    SPLEEN: No mass or enlargement.    PANCREAS: No mass or inflammatory process. Normal pancreatic duct    ADRENAL GLANDS: Unremarkable. No mass.    URINARY SYSTEM: Kidneys are normal in size. No obstructing stone,  or gross mass. Bilateral mild hydroureteronephrosis concerning  for chronic urinary bladder outlet obstruction. Bladder is mildly  distended without mass or stone.      GI TRACT: G-tube in place with good position. No mass, dilation,  or wall thickening.  No diverticula.  No hernia.  Appendix is  normal.      REPRODUCTIVE SYSTEM: Unremarkable    PERITONEAL SPACE:No free air, free  fluid, mass or adenopathy.    RETROPERITONEAL SPACE:  No adenopathy, mass, aneurysm or  significant vascular abnormality.     BONES AND EXTRA-ABDOMINAL SOFT TISSUES: No aggressive osseous  lesion..  No inguinal adenopathy or hernia.      Impression:      Mild to moderate left pleural effusion.   Mild consolidation in the lung bases on the left side and to a  lesser extent on the right.  Bilateral mild hydroureteronephrosis concerning for chronic  urinary bladder outlet obstruction.   The urinary bladder is distended.  Good position of the G-tube.  Appendix is normal.    Electronically signed by:  Carlos Alberto Ospina MD  8/9/2022 3:39 PM CDT  Workstation: [id number]    XR Chest Post CVA Port [[id number]] Collected: 08/09/22 1411     Updated: 08/09/22 1430    Narrative:      EXAMINATION:  XR CHEST POST CVA PORT    CLINICAL HISTORY:  72 years Male,hypo, A41.9 Sepsis, unspecified  organism R65.20 Severe sepsis without septic shock N17.9 Acute  kidney failure, unspecified N17.9 Acute kidney failure,  unspecified E87.5 Hyperkalemia N39.0 Urinary tract infection,  site not specified R31.9 Hematuria, unspecified I95.9  Hypotension, unspecified    COMPARISON:  Chest x-ray dated 8/9/2022    FINDINGS:  Right PICC with tip in the mid SVC, new since earlier  today. No pneumothorax. Small left pleural effusion with adjacent  atelectasis/consolidation. Normal heart size. Evidence of a prior  granulomas process.      Impression:      New right PICC with tip in the SVC. No pneumothorax.    Electronically signed by:  Santos Meredith MD  8/9/2022 2:27 PM CDT  Workstation: [id number]    US Guidance PICC NC [[id number]] Resulted: 08/09/22 1421     Updated: 08/09/22 1421    Narrative:      This procedure was auto-finalized with no dictation required.    IR PICC W Imaging Guidance [[id number]] Resulted: 08/09/22 1418     Updated: 08/09/22 1418    Narrative:      This procedure was auto-finalized with no dictation required.    XR Chest 1 View [[id number]]  Collected: 08/09/22 1054     Updated: 08/09/22 1142    Narrative:      EXAMINATION:  XR CHEST 1 VW    CLINICAL HISTORY:  72 years Male,Stroke Protocol (Onset > 12 hrs)    COMPARISON:  None    FINDINGS:  Small left pleural effusion with adjacent likely  atelectasis or possibly consolidation. No pneumothorax. Normal  heart size and pulmonary vascularity. Evidence of a prior  granulomatous process.      Impression:      Small left pleural effusion with adjacent  atelectasis or possibly consolidation.    Electronically signed by:  Santos Meredith MD  8/9/2022 11:40 AM CDT  Workstation: [id number]    CT Head Without Contrast Stroke Protocol [[id number]] Collected: 08/09/22 1053     Updated: 08/09/22 1122    Narrative:      PROCEDURE: CT HEAD WO CONTRAST STROKE    INDICATION:  CVA one month ago, follow-up    COMPARISON:  None    TECHNIQUE:  CT head, without iv contrast.       This exam was performed according to our departmental  dose-optimization program, which includes automated exposure  control, adjustment of the mA and/or kV according to patient size  and/or use of iterative reconstruction technique.  DLP is 892.5    FINDINGS:    The degree of cerebral and cerebellar atrophy is within normal  limits for age.    There is preservation of the gray-white matter differentiation.     Old infarcts in both basal ganglia are present, as well as a  larger areas of nonacute infarction/encephalomalacia in the right  frontoparietal region.    There are extensive age related degenerative cortical and deep  white matter changes.    There is no evidence of a hemorrhage or intracranial mass.    There is no midline shift.    The ventricles are normal in size and configuration.    The brain stem, globes, paranasal sinuses, and osseous structures  are within normal limits.        Impression:      Generalized cerebral and cerebellar atrophy. No acute  intracranial abnormality, but there are areas of nonacute  infarction/encephalomalacia in  the bilateral basal ganglia, as  well as the right frontal and parietal lobes.  If pain or symptoms persist beyond reasonable expectations, an  MRI examination is suggested as is deemed clinically appropriate.    Electronically signed by:  Eugenie Vera MD  8/9/2022 11:20 AM CDT  Workstation: [id number]          Lab Results (last 48 hours)     Procedure Component Value Units Date/Time    Blood Culture - Blood, Arm, Left [[id number]]  (Normal) Collected: 08/09/22 1257    Specimen: Blood from Arm, Left Updated: 08/11/22 1315     Blood Culture No growth at 2 days    Urine Culture - Urine, Urine, Clean Catch [[id number]]  (Abnormal)  (Susceptibility) Collected: 08/09/22 1142    Specimen: Urine, Clean Catch Updated: 08/11/22 1058     Urine Culture >100,000 CFU/mL Proteus mirabilis    Narrative:      Colonization of the urinary tract without infection is common. Treatment is discouraged unless the patient is symptomatic, pregnant, or undergoing an invasive urologic procedure.    Susceptibility      Proteus mirabilis      CHRISTIANNE      Ampicillin Susceptible      Ampicillin + Sulbactam Susceptible      Cefazolin Susceptible      Cefepime Susceptible      Ceftazidime Susceptible      Ceftriaxone Susceptible      Gentamicin Susceptible      Levofloxacin Resistant     Nitrofurantoin Resistant     Piperacillin + Tazobactam Susceptible      Trimethoprim + Sulfamethoxazole Resistant                   Linear View                   Basic Metabolic Panel [[id number]]  (Abnormal) Collected: 08/11/22 0617    Specimen: Blood Updated: 08/11/22 0659     Glucose 98 mg/dL      BUN 43 mg/dL      Creatinine 0.83 mg/dL      Sodium 144 mmol/L      Potassium 4.5 mmol/L      Chloride 109 mmol/L      CO2 27.0 mmol/L      Calcium 8.5 mg/dL      BUN/Creatinine Ratio 51.8     Anion Gap 8.0 mmol/L      eGFR 93.0 mL/min/1.73      Comment: National Kidney Foundation and American Society of Nephrology (ASN) Task Force recommended calculation based on the  Chronic Kidney Disease Epidemiology Collaboration (CKD-EPI) equation refit without adjustment for race.       Narrative:      GFR Normal >60  Chronic Kidney Disease <60  Kidney Failure <15      CBC Auto Differential [[id number]]  (Abnormal) Collected: 08/11/22 0617    Specimen: Blood Updated: 08/11/22 0635     WBC 13.32 10*3/mm3      RBC 3.76 10*6/mm3      Hemoglobin 9.6 g/dL      Hematocrit 29.6 %      MCV 78.7 fL      MCH 25.5 pg      MCHC 32.4 g/dL      RDW 15.8 %      RDW-SD 44.6 fl      MPV 10.0 fL      Platelets 180 10*3/mm3      Neutrophil % 88.9 %      Lymphocyte % 4.7 %      Monocyte % 4.7 %      Eosinophil % 0.7 %      Basophil % 0.2 %      Immature Grans % 0.8 %      Neutrophils, Absolute 11.83 10*3/mm3      Lymphocytes, Absolute 0.63 10*3/mm3      Monocytes, Absolute 0.63 10*3/mm3      Eosinophils, Absolute 0.09 10*3/mm3      Basophils, Absolute 0.03 10*3/mm3      Immature Grans, Absolute 0.11 10*3/mm3      nRBC 0.0 /100 WBC     Blood Culture - Blood, Hand, Right [[id number]]  (Abnormal) Collected: 08/09/22 1222    Specimen: Blood from Hand, Right Updated: 08/11/22 0511     Blood Culture Proteus species     Isolated from Aerobic Bottle     Gram Stain Aerobic Bottle Gram negative bacilli     Comment: Bottle 1 of 3 positive for gnr.       Protein / Creatinine Ratio, Urine - Urine, Clean Catch [[id number]]  (Abnormal) Collected: 08/09/22 1142    Specimen: Urine, Clean Catch Updated: 08/10/22 0916     Protein/Creatinine Ratio, Urine 15,520.9 mg/G Crea      Creatinine, Urine 38.2 mg/dL      Total Protein, Urine 592.9 mg/dL     Blood Culture ID, PCR - Blood, Hand, Right [[id number]]  (Abnormal) Collected: 08/09/22 1222    Specimen: Blood from Hand, Right Updated: 08/10/22 0525     BCID, PCR Enterobacterales spp. Identification by BCID2 PCR.     BCID, PCR 2 Proteus spp. Identification by BCID2 PCR.    Basic Metabolic Panel [[id number]]  (Abnormal) Collected: 08/10/22 0242    Specimen: Blood Updated: 08/10/22 0306      Glucose 163 mg/dL      BUN 68 mg/dL      Creatinine 1.87 mg/dL      Sodium 140 mmol/L      Potassium 4.2 mmol/L      Chloride 104 mmol/L      CO2 25.0 mmol/L      Calcium 8.3 mg/dL      BUN/Creatinine Ratio 36.4     Anion Gap 11.0 mmol/L      eGFR 37.7 mL/min/1.73      Comment: National Kidney Foundation and American Society of Nephrology (ASN) Task Force recommended calculation based on the Chronic Kidney Disease Epidemiology Collaboration (CKD-EPI) equation refit without adjustment for race.       Narrative:      GFR Normal >60  Chronic Kidney Disease <60  Kidney Failure <15      STAT Lactic Acid, Reflex [[id number]]  (Normal) Collected: 08/10/22 0242    Specimen: Blood Updated: 08/10/22 0257     Lactate 1.6 mmol/L     CBC Auto Differential [[id number]]  (Abnormal) Collected: 08/10/22 0242    Specimen: Blood Updated: 08/10/22 0250     WBC 22.25 10*3/mm3      RBC 3.92 10*6/mm3      Hemoglobin 10.1 g/dL      Hematocrit 30.4 %      MCV 77.6 fL      MCH 25.8 pg      MCHC 33.2 g/dL      RDW 15.9 %      RDW-SD 44.4 fl      MPV 10.0 fL      Platelets 244 10*3/mm3      Neutrophil % 88.4 %      Lymphocyte % 4.9 %      Monocyte % 5.5 %      Eosinophil % 0.0 %      Basophil % 0.2 %      Immature Grans % 1.0 %      Neutrophils, Absolute 19.66 10*3/mm3      Lymphocytes, Absolute 1.09 10*3/mm3      Monocytes, Absolute 1.22 10*3/mm3      Eosinophils, Absolute 0.00 10*3/mm3      Basophils, Absolute 0.05 10*3/mm3      Immature Grans, Absolute 0.23 10*3/mm3      nRBC 0.0 /100 WBC     STAT Lactic Acid, Reflex [[id number]]  (Abnormal) Collected: 08/09/22 2049    Specimen: Blood Updated: 08/09/22 2144     Lactate 2.5 mmol/L     Basic Metabolic Panel [[id number]]  (Abnormal) Collected: 08/09/22 1721    Specimen: Blood Updated: 08/09/22 1750     Glucose 197 mg/dL      BUN 72 mg/dL      Creatinine 2.55 mg/dL      Sodium 139 mmol/L      Potassium 5.3 mmol/L      Comment: Slight hemolysis detected by analyzer. Results may be affected.         Chloride 105 mmol/L      CO2 20.0 mmol/L      Calcium 8.2 mg/dL      BUN/Creatinine Ratio 28.2     Anion Gap 14.0 mmol/L      eGFR 26.0 mL/min/1.73      Comment: National Kidney Foundation and American Society of Nephrology (ASN) Task Force recommended calculation based on the Chronic Kidney Disease Epidemiology Collaboration (CKD-EPI) equation refit without adjustment for race.       Narrative:      GFR Normal >60  Chronic Kidney Disease <60  Kidney Failure <15      STAT Lactic Acid, Reflex [[id number]]  (Abnormal) Collected: 08/09/22 1721    Specimen: Blood Updated: 08/09/22 1750     Lactate 3.0 mmol/L             Assessment & Plan   Assessment / Plan     Brief Patient Summary:  Fer Ivan is a 72 y.o. male who has chronic retention of urine, sepsis with shock from UTI, NELLIE resolving. There was bilateral hydroureteronephrosis with bladder distention on imaging but Hester has since been changed.     Active Hospital Problems:  Active Hospital Problems    Diagnosis    • Sepsis with acute renal failure without septic shock, due to unspecified organism, unspecified acute renal failure type (HCC)      Plan:   Treat UTI  Consider follow up ultrasound renals to ensure hydronephrosis resolved.  Keep Hester for now.   Will discuss with patient and his family the option for suprapubic catheter when he is improved clinically.     RASHAUN Toney

## 2022-08-12 LAB
ANION GAP SERPL CALCULATED.3IONS-SCNC: 9 MMOL/L (ref 5–15)
BACTERIA SPEC AEROBE CULT: ABNORMAL
BASOPHILS # BLD AUTO: 0.03 10*3/MM3 (ref 0–0.2)
BASOPHILS NFR BLD AUTO: 0.3 % (ref 0–1.5)
BUN SERPL-MCNC: 31 MG/DL (ref 8–23)
BUN/CREAT SERPL: 48.4 (ref 7–25)
CALCIUM SPEC-SCNC: 8.4 MG/DL (ref 8.6–10.5)
CHLORIDE SERPL-SCNC: 105 MMOL/L (ref 98–107)
CO2 SERPL-SCNC: 25 MMOL/L (ref 22–29)
CREAT SERPL-MCNC: 0.64 MG/DL (ref 0.76–1.27)
DEPRECATED RDW RBC AUTO: 44.2 FL (ref 37–54)
EGFRCR SERPLBLD CKD-EPI 2021: 100.6 ML/MIN/1.73
EOSINOPHIL # BLD AUTO: 0.15 10*3/MM3 (ref 0–0.4)
EOSINOPHIL NFR BLD AUTO: 1.5 % (ref 0.3–6.2)
ERYTHROCYTE [DISTWIDTH] IN BLOOD BY AUTOMATED COUNT: 15.9 % (ref 12.3–15.4)
GLUCOSE SERPL-MCNC: 130 MG/DL (ref 65–99)
GRAM STN SPEC: ABNORMAL
HCT VFR BLD AUTO: 29.5 % (ref 37.5–51)
HGB BLD-MCNC: 9.5 G/DL (ref 13–17.7)
IMM GRANULOCYTES # BLD AUTO: 0.16 10*3/MM3 (ref 0–0.05)
IMM GRANULOCYTES NFR BLD AUTO: 1.6 % (ref 0–0.5)
ISOLATED FROM: ABNORMAL
LYMPHOCYTES # BLD AUTO: 0.74 10*3/MM3 (ref 0.7–3.1)
LYMPHOCYTES NFR BLD AUTO: 7.4 % (ref 19.6–45.3)
MCH RBC QN AUTO: 24.9 PG (ref 26.6–33)
MCHC RBC AUTO-ENTMCNC: 32.2 G/DL (ref 31.5–35.7)
MCV RBC AUTO: 77.4 FL (ref 79–97)
MONOCYTES # BLD AUTO: 0.56 10*3/MM3 (ref 0.1–0.9)
MONOCYTES NFR BLD AUTO: 5.6 % (ref 5–12)
NEUTROPHILS NFR BLD AUTO: 8.35 10*3/MM3 (ref 1.7–7)
NEUTROPHILS NFR BLD AUTO: 83.6 % (ref 42.7–76)
NRBC BLD AUTO-RTO: 0 /100 WBC (ref 0–0.2)
PLATELET # BLD AUTO: 175 10*3/MM3 (ref 140–450)
PMV BLD AUTO: 10.4 FL (ref 6–12)
POTASSIUM SERPL-SCNC: 4.3 MMOL/L (ref 3.5–5.2)
RBC # BLD AUTO: 3.81 10*6/MM3 (ref 4.14–5.8)
SODIUM SERPL-SCNC: 139 MMOL/L (ref 136–145)
WBC NRBC COR # BLD: 9.99 10*3/MM3 (ref 3.4–10.8)

## 2022-08-12 PROCEDURE — 36415 COLL VENOUS BLD VENIPUNCTURE: CPT | Performed by: STUDENT IN AN ORGANIZED HEALTH CARE EDUCATION/TRAINING PROGRAM

## 2022-08-12 PROCEDURE — 25010000002 MEROPENEM PER 100 MG

## 2022-08-12 PROCEDURE — 80048 BASIC METABOLIC PNL TOTAL CA: CPT | Performed by: STUDENT IN AN ORGANIZED HEALTH CARE EDUCATION/TRAINING PROGRAM

## 2022-08-12 PROCEDURE — 94799 UNLISTED PULMONARY SVC/PX: CPT

## 2022-08-12 PROCEDURE — 85025 COMPLETE CBC W/AUTO DIFF WBC: CPT | Performed by: STUDENT IN AN ORGANIZED HEALTH CARE EDUCATION/TRAINING PROGRAM

## 2022-08-12 PROCEDURE — 87040 BLOOD CULTURE FOR BACTERIA: CPT | Performed by: INTERNAL MEDICINE

## 2022-08-12 PROCEDURE — 25010000002 HEPARIN (PORCINE) PER 1000 UNITS: Performed by: STUDENT IN AN ORGANIZED HEALTH CARE EDUCATION/TRAINING PROGRAM

## 2022-08-12 PROCEDURE — 94760 N-INVAS EAR/PLS OXIMETRY 1: CPT

## 2022-08-12 RX ADMIN — PANTOPRAZOLE SODIUM 40 MG: 40 INJECTION, POWDER, FOR SOLUTION INTRAVENOUS at 06:30

## 2022-08-12 RX ADMIN — METOCLOPRAMIDE 5 MG: 5 TABLET ORAL at 06:30

## 2022-08-12 RX ADMIN — METOCLOPRAMIDE 5 MG: 5 TABLET ORAL at 21:09

## 2022-08-12 RX ADMIN — HEPARIN SODIUM 5000 UNITS: 5000 INJECTION INTRAVENOUS; SUBCUTANEOUS at 21:09

## 2022-08-12 RX ADMIN — MIDODRINE HYDROCHLORIDE 5 MG: 5 TABLET ORAL at 16:46

## 2022-08-12 RX ADMIN — METOCLOPRAMIDE 5 MG: 5 TABLET ORAL at 12:05

## 2022-08-12 RX ADMIN — FLUOXETINE 20 MG: 20 CAPSULE ORAL at 08:58

## 2022-08-12 RX ADMIN — MEROPENEM 1 G: 1 INJECTION, POWDER, FOR SOLUTION INTRAVENOUS at 00:04

## 2022-08-12 RX ADMIN — HEPARIN SODIUM 5000 UNITS: 5000 INJECTION INTRAVENOUS; SUBCUTANEOUS at 06:30

## 2022-08-12 RX ADMIN — Medication 10 ML: at 08:58

## 2022-08-12 RX ADMIN — HYDROCODONE BITARTRATE AND ACETAMINOPHEN 1 TABLET: 5; 325 TABLET ORAL at 19:46

## 2022-08-12 RX ADMIN — MIDODRINE HYDROCHLORIDE 5 MG: 5 TABLET ORAL at 12:05

## 2022-08-12 RX ADMIN — METOCLOPRAMIDE 5 MG: 5 TABLET ORAL at 16:46

## 2022-08-12 RX ADMIN — MEROPENEM 1 G: 1 INJECTION, POWDER, FOR SOLUTION INTRAVENOUS at 23:54

## 2022-08-12 RX ADMIN — HEPARIN SODIUM 5000 UNITS: 5000 INJECTION INTRAVENOUS; SUBCUTANEOUS at 13:53

## 2022-08-12 RX ADMIN — MIDODRINE HYDROCHLORIDE 5 MG: 5 TABLET ORAL at 06:30

## 2022-08-12 RX ADMIN — MEROPENEM 1 G: 1 INJECTION, POWDER, FOR SOLUTION INTRAVENOUS at 12:05

## 2022-08-12 RX ADMIN — Medication 10 ML: at 21:09

## 2022-08-12 NOTE — NURSING NOTE
Spoke with patient daughter yesterday afternoon. Explained I was asked to see patient for palliative care; discussion with patient daughter. She is planning at this time for her father to go back to skilled nursing facility and to resume care with hospice care; patient was utilizing [facility].

## 2022-08-12 NOTE — PROGRESS NOTES
[facility] Medicine Services  INPATIENT PROGRESS NOTE    Length of Stay: 3  Date of Admission: 8/9/2022  Primary Care Physician: Estrada Fuentes MD    Subjective     Patient seen and evaluated this morning, doing well and in good spirit, did not have any complains or issues.    Objective    Temp:  [97.5 °F (36.4 °C)-98.9 °F (37.2 °C)] 98 °F (36.7 °C)  Heart Rate:  [] 103  Resp:  [16-18] 18  BP: ()/(50-76) 105/59    Physical Exam:   Temp:  [97.5 °F (36.4 °C)-98.9 °F (37.2 °C)] 98 °F (36.7 °C)  Heart Rate:  [] 103  Resp:  [16-18] 18  BP: ()/(50-76) 105/59  Physical Exam  General: NC/AT, appears stated age, alert and oriented x3  HEENT: PERRLA, EOMI, no scleral icterus, no conjunctival injection,   NECK:  Neck is supple, no JVD, no supraclavicular lymphadenopathy  CV: S1 S2 RRR, no murmurs, no gallops, no heaves, pulses palpable.  LUNG: CTA, no wheezing crackles or rhonchi  ABD: Nondistended, nontender, bowel sounds present, no guarding or rebound, organomegaly not appreciated.   EXT: Limited ROM of left extremity and lower extremity from previous CVA, pulses palpable, stasis edema  Neuro: CN 2-12, grossly intact, h/o CVA with residual weakness  Skin: Warm and dry, no rash or lesions.    Results Review:  I have reviewed the labs, radiology results, and diagnostic studies.    Laboratory Data:   Results from last 7 days   Lab Units 08/12/22  0618 08/11/22  0617 08/10/22  0242 08/09/22  1257 08/09/22  1130   SODIUM mmol/L 139 144 140   < > 138   POTASSIUM mmol/L 4.3 4.5 4.2   < > 6.4*   CHLORIDE mmol/L 105 109* 104   < > 96*   CO2 mmol/L 25.0 27.0 25.0   < > 19.0*   BUN mg/dL 31* 43* 68*   < > 78*   CREATININE mg/dL 0.64* 0.83 1.87*   < > 3.46*   GLUCOSE mg/dL 130* 98 163*   < > 210*   CALCIUM mg/dL 8.4* 8.5* 8.3*   < > 10.0   BILIRUBIN mg/dL  --   --   --   --  0.7   ALK PHOS U/L  --   --   --   --  139*   ALT (SGPT) U/L  --   --   --   --  40    AST (SGOT) U/L  --   --   --   --  30   ANION GAP mmol/L 9.0 8.0 11.0   < > 23.0*    < > = values in this interval not displayed.     Estimated Creatinine Clearance: 105.8 mL/min (A) (by C-G formula based on SCr of 0.64 mg/dL (L)).  Results from last 7 days   Lab Units 08/09/22  1130   MAGNESIUM mg/dL 2.7*         Results from last 7 days   Lab Units 08/12/22  0618 08/11/22  0617 08/10/22  0242 08/09/22  1130   WBC 10*3/mm3 9.99 13.32* 22.25* 31.33*   HEMOGLOBIN g/dL 9.5* 9.6* 10.1* 14.4   HEMATOCRIT % 29.5* 29.6* 30.4* 43.7   PLATELETS 10*3/mm3 175 180 244 424     Results from last 7 days   Lab Units 08/09/22  1129   INR  1.30*       Culture Data:   Blood Culture   Date Value Ref Range Status   08/09/2022 No growth at 2 days  Preliminary   08/09/2022 Proteus mirabilis (C)  Final     Urine Culture   Date Value Ref Range Status   08/09/2022 >100,000 CFU/mL Proteus mirabilis (A)  Final     No results found for: RESPCX  No results found for: WOUNDCX  No results found for: STOOLCX  No components found for: BODYFLD    Radiology Data:   Imaging Results (Last 24 Hours)     ** No results found for the last 24 hours. **          I have reviewed the patient's current medications.     Assessment/Plan     Active Hospital Problems    Diagnosis    • Sepsis with acute renal failure without septic shock, due to unspecified organism, unspecified acute renal failure type (HCC)    NELLIE, resolved  Bacteremia with Proteus mirabilis  Shock, resolved  H/o CVA with residual weakness        ECHO, 8/10  Left ventricular ejection fraction appears to be 26 - 30%. Left ventricular systolic function is severely decreased.  Left ventricular diastolic function is consistent with (grade Ia w/high LAP) impaired relaxation.  There is a moderate sized left pleural effusion.         08/11  Plan:   -Continue IV antibiotics with Merrem  -Levophed weaned off, continue midodrine for hypotension  -Repeat blood culture tomorrow  -Tube feeding  restarted  -Palliative consult done today    __________________________________________________________________    8/12  Plan:  -Patient doing well, off pressor >24 hrs with good BP and MAP  -Patient A/O x3 and in good spirit  -Continue IV Merrem  -Repeat bcx today  -Transfer to [facility]  -Continue supplementary tube feeding, night time  -PT/OT on [facility]    -Disp: plan to d/c back to [facility] when bcx negative and treatment completion.     -DNR  -DVT and GI prophylaxis          Benito Lucio MD

## 2022-08-13 LAB
ANION GAP SERPL CALCULATED.3IONS-SCNC: 9 MMOL/L (ref 5–15)
BASOPHILS # BLD AUTO: 0.03 10*3/MM3 (ref 0–0.2)
BASOPHILS NFR BLD AUTO: 0.3 % (ref 0–1.5)
BUN SERPL-MCNC: 25 MG/DL (ref 8–23)
BUN/CREAT SERPL: 44.6 (ref 7–25)
CALCIUM SPEC-SCNC: 8.1 MG/DL (ref 8.6–10.5)
CHLORIDE SERPL-SCNC: 107 MMOL/L (ref 98–107)
CO2 SERPL-SCNC: 26 MMOL/L (ref 22–29)
CREAT SERPL-MCNC: 0.56 MG/DL (ref 0.76–1.27)
DEPRECATED RDW RBC AUTO: 45.9 FL (ref 37–54)
EGFRCR SERPLBLD CKD-EPI 2021: 104.7 ML/MIN/1.73
EOSINOPHIL # BLD AUTO: 0.23 10*3/MM3 (ref 0–0.4)
EOSINOPHIL NFR BLD AUTO: 2.1 % (ref 0.3–6.2)
ERYTHROCYTE [DISTWIDTH] IN BLOOD BY AUTOMATED COUNT: 15.9 % (ref 12.3–15.4)
GLUCOSE SERPL-MCNC: 142 MG/DL (ref 65–99)
HCT VFR BLD AUTO: 30 % (ref 37.5–51)
HGB BLD-MCNC: 9.5 G/DL (ref 13–17.7)
IMM GRANULOCYTES # BLD AUTO: 0.31 10*3/MM3 (ref 0–0.05)
IMM GRANULOCYTES NFR BLD AUTO: 2.8 % (ref 0–0.5)
LYMPHOCYTES # BLD AUTO: 0.92 10*3/MM3 (ref 0.7–3.1)
LYMPHOCYTES NFR BLD AUTO: 8.3 % (ref 19.6–45.3)
MCH RBC QN AUTO: 25.3 PG (ref 26.6–33)
MCHC RBC AUTO-ENTMCNC: 31.7 G/DL (ref 31.5–35.7)
MCV RBC AUTO: 80 FL (ref 79–97)
MONOCYTES # BLD AUTO: 0.7 10*3/MM3 (ref 0.1–0.9)
MONOCYTES NFR BLD AUTO: 6.3 % (ref 5–12)
NEUTROPHILS NFR BLD AUTO: 8.85 10*3/MM3 (ref 1.7–7)
NEUTROPHILS NFR BLD AUTO: 80.2 % (ref 42.7–76)
NRBC BLD AUTO-RTO: 0.2 /100 WBC (ref 0–0.2)
PLATELET # BLD AUTO: 137 10*3/MM3 (ref 140–450)
PMV BLD AUTO: 11.5 FL (ref 6–12)
POTASSIUM SERPL-SCNC: 4.6 MMOL/L (ref 3.5–5.2)
RBC # BLD AUTO: 3.75 10*6/MM3 (ref 4.14–5.8)
SODIUM SERPL-SCNC: 142 MMOL/L (ref 136–145)
WBC NRBC COR # BLD: 11.04 10*3/MM3 (ref 3.4–10.8)

## 2022-08-13 PROCEDURE — 25010000002 HEPARIN (PORCINE) PER 1000 UNITS: Performed by: STUDENT IN AN ORGANIZED HEALTH CARE EDUCATION/TRAINING PROGRAM

## 2022-08-13 PROCEDURE — 85025 COMPLETE CBC W/AUTO DIFF WBC: CPT | Performed by: STUDENT IN AN ORGANIZED HEALTH CARE EDUCATION/TRAINING PROGRAM

## 2022-08-13 PROCEDURE — 25010000002 MEROPENEM PER 100 MG

## 2022-08-13 PROCEDURE — 94761 N-INVAS EAR/PLS OXIMETRY MLT: CPT

## 2022-08-13 PROCEDURE — 94799 UNLISTED PULMONARY SVC/PX: CPT

## 2022-08-13 PROCEDURE — 80048 BASIC METABOLIC PNL TOTAL CA: CPT | Performed by: STUDENT IN AN ORGANIZED HEALTH CARE EDUCATION/TRAINING PROGRAM

## 2022-08-13 RX ADMIN — HYDROCODONE BITARTRATE AND ACETAMINOPHEN 1 TABLET: 5; 325 TABLET ORAL at 04:08

## 2022-08-13 RX ADMIN — METOCLOPRAMIDE 5 MG: 5 TABLET ORAL at 10:35

## 2022-08-13 RX ADMIN — MIDODRINE HYDROCHLORIDE 5 MG: 5 TABLET ORAL at 06:35

## 2022-08-13 RX ADMIN — METOCLOPRAMIDE 5 MG: 5 TABLET ORAL at 20:07

## 2022-08-13 RX ADMIN — MIDODRINE HYDROCHLORIDE 5 MG: 5 TABLET ORAL at 10:35

## 2022-08-13 RX ADMIN — HEPARIN SODIUM 5000 UNITS: 5000 INJECTION INTRAVENOUS; SUBCUTANEOUS at 05:59

## 2022-08-13 RX ADMIN — METOCLOPRAMIDE 5 MG: 5 TABLET ORAL at 16:34

## 2022-08-13 RX ADMIN — PANTOPRAZOLE SODIUM 40 MG: 40 INJECTION, POWDER, FOR SOLUTION INTRAVENOUS at 05:59

## 2022-08-13 RX ADMIN — HEPARIN SODIUM 5000 UNITS: 5000 INJECTION INTRAVENOUS; SUBCUTANEOUS at 22:00

## 2022-08-13 RX ADMIN — METOCLOPRAMIDE 5 MG: 5 TABLET ORAL at 06:34

## 2022-08-13 RX ADMIN — MIDODRINE HYDROCHLORIDE 5 MG: 5 TABLET ORAL at 16:34

## 2022-08-13 RX ADMIN — FLUOXETINE 20 MG: 20 CAPSULE ORAL at 08:03

## 2022-08-13 RX ADMIN — Medication 10 ML: at 08:02

## 2022-08-13 RX ADMIN — MEROPENEM 1 G: 1 INJECTION, POWDER, FOR SOLUTION INTRAVENOUS at 11:56

## 2022-08-13 RX ADMIN — Medication 10 ML: at 20:07

## 2022-08-13 RX ADMIN — HEPARIN SODIUM 5000 UNITS: 5000 INJECTION INTRAVENOUS; SUBCUTANEOUS at 14:22

## 2022-08-13 NOTE — PROGRESS NOTES
[facility] Medicine Services  INPATIENT PROGRESS NOTE    Length of Stay: 4  Date of Admission: 8/9/2022  Primary Care Physician: Estrada Fuentes MD    Subjective     No acute events overnight.     Review of Systems     All pertinent negatives and positives are as above. All other systems have been reviewed and are negative unless otherwise stated.     Objective    Temp:  [96.9 °F (36.1 °C)-99.7 °F (37.6 °C)] 98.5 °F (36.9 °C)  Heart Rate:  [] 99  Resp:  [16-20] 19  BP: ()/(53-84) 145/84    Physical Exam  Constitutional:       General: He is not in acute distress.  HENT:      Head: Normocephalic and atraumatic.   Eyes:      Extraocular Movements: Extraocular movements intact.   Pulmonary:      Effort: Pulmonary effort is normal. No respiratory distress.   Abdominal:      General: There is no distension.      Palpations: Abdomen is soft.   Musculoskeletal:         General: No swelling.      Cervical back: No rigidity.   Neurological:      General: No focal deficit present.             Results Review:  I have reviewed the labs, radiology results, and diagnostic studies.    Laboratory Data:   Results from last 7 days   Lab Units 08/13/22  0447 08/12/22  0618 08/11/22  0617 08/09/22  1257 08/09/22  1130   SODIUM mmol/L 142 139 144   < > 138   POTASSIUM mmol/L 4.6 4.3 4.5   < > 6.4*   CHLORIDE mmol/L 107 105 109*   < > 96*   CO2 mmol/L 26.0 25.0 27.0   < > 19.0*   BUN mg/dL 25* 31* 43*   < > 78*   CREATININE mg/dL 0.56* 0.64* 0.83   < > 3.46*   GLUCOSE mg/dL 142* 130* 98   < > 210*   CALCIUM mg/dL 8.1* 8.4* 8.5*   < > 10.0   BILIRUBIN mg/dL  --   --   --   --  0.7   ALK PHOS U/L  --   --   --   --  139*   ALT (SGPT) U/L  --   --   --   --  40   AST (SGOT) U/L  --   --   --   --  30   ANION GAP mmol/L 9.0 9.0 8.0   < > 23.0*    < > = values in this interval not displayed.     Estimated Creatinine Clearance: 119.4 mL/min (A) (by C-G formula based on SCr of 0.56 mg/dL  (L)).  Results from last 7 days   Lab Units 08/09/22  1130   MAGNESIUM mg/dL 2.7*         Results from last 7 days   Lab Units 08/13/22  0447 08/12/22  0618 08/11/22  0617 08/10/22  0242 08/09/22  1130   WBC 10*3/mm3 11.04* 9.99 13.32* 22.25* 31.33*   HEMOGLOBIN g/dL 9.5* 9.5* 9.6* 10.1* 14.4   HEMATOCRIT % 30.0* 29.5* 29.6* 30.4* 43.7   PLATELETS 10*3/mm3 137* 175 180 244 424     Results from last 7 days   Lab Units 08/09/22  1129   INR  1.30*       Culture Data:   Blood Culture   Date Value Ref Range Status   08/12/2022 No growth at 24 hours  Preliminary   08/12/2022 No growth at 24 hours  Preliminary     No results found for: URINECX  No results found for: RESPCX  No results found for: WOUNDCX  No results found for: STOOLCX  No components found for: BODYFLD    Radiology Data:   Imaging Results (Last 24 Hours)     ** No results found for the last 24 hours. **          I have reviewed the patient's current medications.     Assessment/Plan     Active Hospital Problems    Diagnosis    • Sepsis with acute renal failure without septic shock, due to unspecified organism, unspecified acute renal failure type (HCC)        Plan:      Sepsis.  Likely source urinary.  Blood cultures positive for Proteus and Enterobacter.  Currently on meropenem.  Off pressors.    Urinary tract infection.  Urine culture positive for Proteus.  On meropenem.  Evaluated by urology for chronic urinary retention.  Continue Hester catheter.  May need suprapubic catheter placement next week.    COVID-19 diagnosed 2 weeks ago.  Asymptomatic.    DVT prophylaxis.  On subcutaneous heparin.    Transfer to the floor.      The patient was evaluated during the global COVID-19 pandemic, and the diagnosis was suspected/considered upon their initial presentation.  Evaluation, treatment, and testing were consistent with current guidelines for patients who present with complaints or symptoms that may be related to COVID-19.      Jean Carlos Reyna MD

## 2022-08-13 NOTE — PLAN OF CARE
Goal Outcome Evaluation:           Progress: no change   Alert with confusion to time/place. 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 Appetite remains poor. VSS, urine output adequate. Awaiting transfer to floor.

## 2022-08-13 NOTE — PROGRESS NOTES
"   LOS: 4 days   Patient Care Team:  Estrada Fuentes MD as PCP - General (Family Medicine)    Subjective     Subjective:  Symptoms:  (Clear yellow urine in Hester's gravity bag).        History taken from: patient chart RN    Objective     Vital Signs  Temp:  [96.9 °F (36.1 °C)-99.7 °F (37.6 °C)] 98.4 °F (36.9 °C)  Heart Rate:  [] 100  Resp:  [16-20] 19  BP: ()/(53-81) 119/59    Objective:  Vital signs: (most recent): Blood pressure 119/59, pulse 100, temperature 98.4 °F (36.9 °C), temperature source Oral, resp. rate 19, height 188 cm (74\"), weight 70.8 kg (156 lb), SpO2 100 %.  No fever.  (TMAX 99.7).    Output: Producing urine (Hester).    Heart: Tachycardia.    Chest: Symmetric chest wall expansion.   Abdomen: Abdomen is distended.  There is no abdominal tenderness.     Neurological: Patient is alert.    Skin:  Warm, dry and pale.              Results Review:    Lab Results (last 24 hours)     Procedure Component Value Units Date/Time    Basic Metabolic Panel [[id number]]  (Abnormal) Collected: 08/13/22 0447    Specimen: Blood Updated: 08/13/22 0609     Glucose 142 mg/dL      BUN 25 mg/dL      Creatinine 0.56 mg/dL      Sodium 142 mmol/L      Potassium 4.6 mmol/L      Comment: Slight hemolysis detected by analyzer. Results may be affected.        Chloride 107 mmol/L      CO2 26.0 mmol/L      Calcium 8.1 mg/dL      BUN/Creatinine Ratio 44.6     Anion Gap 9.0 mmol/L      eGFR 104.7 mL/min/1.73      Comment: National Kidney Foundation and American Society of Nephrology (ASN) Task Force recommended calculation based on the Chronic Kidney Disease Epidemiology Collaboration (CKD-EPI) equation refit without adjustment for race.       Narrative:      GFR Normal >60  Chronic Kidney Disease <60  Kidney Failure <15      CBC Auto Differential [[id number]]  (Abnormal) Collected: 08/13/22 0447    Specimen: Blood Updated: 08/13/22 0540     WBC 11.04 10*3/mm3      RBC 3.75 10*6/mm3      Hemoglobin 9.5 g/dL      " Hematocrit 30.0 %      MCV 80.0 fL      MCH 25.3 pg      MCHC 31.7 g/dL      RDW 15.9 %      RDW-SD 45.9 fl      MPV 11.5 fL      Platelets 137 10*3/mm3      Neutrophil % 80.2 %      Lymphocyte % 8.3 %      Monocyte % 6.3 %      Eosinophil % 2.1 %      Basophil % 0.3 %      Immature Grans % 2.8 %      Neutrophils, Absolute 8.85 10*3/mm3      Lymphocytes, Absolute 0.92 10*3/mm3      Monocytes, Absolute 0.70 10*3/mm3      Eosinophils, Absolute 0.23 10*3/mm3      Basophils, Absolute 0.03 10*3/mm3      Immature Grans, Absolute 0.31 10*3/mm3      nRBC 0.2 /100 WBC     Blood Culture - Blood, Arm, Left [[id number]]  (Normal) Collected: 08/09/22 1257    Specimen: Blood from Arm, Left Updated: 08/12/22 1317     Blood Culture No growth at 3 days    Blood Culture - Blood, Arm, Right [[id number]] Collected: 08/12/22 1137    Specimen: Blood from Arm, Right Updated: 08/12/22 1144    Blood Culture - Blood, Arm, Left [[id number]] Collected: 08/12/22 1137    Specimen: Blood from Arm, Left Updated: 08/12/22 1144         Imaging Results (Last 24 Hours)     ** No results found for the last 24 hours. **           I reviewed the patient's new clinical results.  I reviewed the patient's new imaging results and agree with the interpretation.  I reviewed the patient's other test results and agree with the interpretation      Assessment & Plan       Sepsis with acute renal failure without septic shock, due to unspecified organism, unspecified acute renal failure type (HCC)      Assessment & Plan    Consulted to Urology for chronic retention of urine, treated for sepsis with shock from UTI, NELLIE resolved. Hester has been changed this admission. Afebrile.  -Estimated Creatinine Clearance: 119.4 mL/min (A) (by C-G formula based on SCr of 0.56 mg/dL (L)).    Plan:  Treating UTI  Keep Hester  Possible SP tube placement next week if patient still admitted    ErrolRASHAUN Owen  08/13/22  10:00 CDT

## 2022-08-13 NOTE — PLAN OF CARE
Goal Outcome Evaluation:   Patient temp 99.7 this AM. Meds given and effective in reducing temp. WBC noted with slight increase this AM. PO intake very poor this shift. VSS. Patient rested well. Blood Cult pending.

## 2022-08-14 LAB
ANION GAP SERPL CALCULATED.3IONS-SCNC: 9 MMOL/L (ref 5–15)
BACTERIA SPEC AEROBE CULT: NORMAL
BASOPHILS # BLD AUTO: 0.06 10*3/MM3 (ref 0–0.2)
BASOPHILS NFR BLD AUTO: 0.4 % (ref 0–1.5)
BUN SERPL-MCNC: 23 MG/DL (ref 8–23)
BUN/CREAT SERPL: 44.2 (ref 7–25)
CALCIUM SPEC-SCNC: 8.2 MG/DL (ref 8.6–10.5)
CHLORIDE SERPL-SCNC: 105 MMOL/L (ref 98–107)
CO2 SERPL-SCNC: 24 MMOL/L (ref 22–29)
CREAT SERPL-MCNC: 0.52 MG/DL (ref 0.76–1.27)
DEPRECATED RDW RBC AUTO: 44.7 FL (ref 37–54)
EGFRCR SERPLBLD CKD-EPI 2021: 107.1 ML/MIN/1.73
EOSINOPHIL # BLD AUTO: 0.29 10*3/MM3 (ref 0–0.4)
EOSINOPHIL NFR BLD AUTO: 2 % (ref 0.3–6.2)
ERYTHROCYTE [DISTWIDTH] IN BLOOD BY AUTOMATED COUNT: 15.9 % (ref 12.3–15.4)
GLUCOSE SERPL-MCNC: 137 MG/DL (ref 65–99)
HCT VFR BLD AUTO: 29.7 % (ref 37.5–51)
HGB BLD-MCNC: 9.5 G/DL (ref 13–17.7)
IMM GRANULOCYTES # BLD AUTO: 0.6 10*3/MM3 (ref 0–0.05)
IMM GRANULOCYTES NFR BLD AUTO: 4.1 % (ref 0–0.5)
LYMPHOCYTES # BLD AUTO: 1.09 10*3/MM3 (ref 0.7–3.1)
LYMPHOCYTES NFR BLD AUTO: 7.5 % (ref 19.6–45.3)
MCH RBC QN AUTO: 25 PG (ref 26.6–33)
MCHC RBC AUTO-ENTMCNC: 32 G/DL (ref 31.5–35.7)
MCV RBC AUTO: 78.2 FL (ref 79–97)
MONOCYTES # BLD AUTO: 0.83 10*3/MM3 (ref 0.1–0.9)
MONOCYTES NFR BLD AUTO: 5.7 % (ref 5–12)
NEUTROPHILS NFR BLD AUTO: 11.61 10*3/MM3 (ref 1.7–7)
NEUTROPHILS NFR BLD AUTO: 80.3 % (ref 42.7–76)
NRBC BLD AUTO-RTO: 0 /100 WBC (ref 0–0.2)
PLATELET # BLD AUTO: 197 10*3/MM3 (ref 140–450)
PMV BLD AUTO: 10.2 FL (ref 6–12)
POTASSIUM SERPL-SCNC: 4.2 MMOL/L (ref 3.5–5.2)
RBC # BLD AUTO: 3.8 10*6/MM3 (ref 4.14–5.8)
SODIUM SERPL-SCNC: 138 MMOL/L (ref 136–145)
WBC NRBC COR # BLD: 14.48 10*3/MM3 (ref 3.4–10.8)

## 2022-08-14 PROCEDURE — 25010000002 MEROPENEM PER 100 MG

## 2022-08-14 PROCEDURE — 85025 COMPLETE CBC W/AUTO DIFF WBC: CPT | Performed by: STUDENT IN AN ORGANIZED HEALTH CARE EDUCATION/TRAINING PROGRAM

## 2022-08-14 PROCEDURE — 80048 BASIC METABOLIC PNL TOTAL CA: CPT | Performed by: STUDENT IN AN ORGANIZED HEALTH CARE EDUCATION/TRAINING PROGRAM

## 2022-08-14 PROCEDURE — 25010000002 HEPARIN (PORCINE) PER 1000 UNITS: Performed by: STUDENT IN AN ORGANIZED HEALTH CARE EDUCATION/TRAINING PROGRAM

## 2022-08-14 PROCEDURE — 36415 COLL VENOUS BLD VENIPUNCTURE: CPT | Performed by: STUDENT IN AN ORGANIZED HEALTH CARE EDUCATION/TRAINING PROGRAM

## 2022-08-14 RX ADMIN — HEPARIN SODIUM 5000 UNITS: 5000 INJECTION INTRAVENOUS; SUBCUTANEOUS at 14:59

## 2022-08-14 RX ADMIN — PANTOPRAZOLE SODIUM 40 MG: 40 INJECTION, POWDER, FOR SOLUTION INTRAVENOUS at 05:27

## 2022-08-14 RX ADMIN — MEROPENEM 1 G: 1 INJECTION, POWDER, FOR SOLUTION INTRAVENOUS at 00:00

## 2022-08-14 RX ADMIN — METOCLOPRAMIDE 5 MG: 5 TABLET ORAL at 20:14

## 2022-08-14 RX ADMIN — Medication 10 ML: at 20:38

## 2022-08-14 RX ADMIN — HYDROCODONE BITARTRATE AND ACETAMINOPHEN 1 TABLET: 5; 325 TABLET ORAL at 05:28

## 2022-08-14 RX ADMIN — FLUOXETINE 20 MG: 20 CAPSULE ORAL at 09:53

## 2022-08-14 RX ADMIN — MEROPENEM 1 G: 1 INJECTION, POWDER, FOR SOLUTION INTRAVENOUS at 12:46

## 2022-08-14 RX ADMIN — METOCLOPRAMIDE 5 MG: 5 TABLET ORAL at 06:34

## 2022-08-14 RX ADMIN — HYDROCODONE BITARTRATE AND ACETAMINOPHEN 1 TABLET: 5; 325 TABLET ORAL at 20:14

## 2022-08-14 RX ADMIN — MIDODRINE HYDROCHLORIDE 5 MG: 5 TABLET ORAL at 06:35

## 2022-08-14 RX ADMIN — Medication 10 ML: at 09:53

## 2022-08-14 RX ADMIN — HEPARIN SODIUM 5000 UNITS: 5000 INJECTION INTRAVENOUS; SUBCUTANEOUS at 05:28

## 2022-08-14 RX ADMIN — HEPARIN SODIUM 5000 UNITS: 5000 INJECTION INTRAVENOUS; SUBCUTANEOUS at 21:08

## 2022-08-14 RX ADMIN — MIDODRINE HYDROCHLORIDE 5 MG: 5 TABLET ORAL at 12:45

## 2022-08-14 RX ADMIN — METOCLOPRAMIDE 5 MG: 5 TABLET ORAL at 12:45

## 2022-08-14 RX ADMIN — METOCLOPRAMIDE 5 MG: 5 TABLET ORAL at 18:50

## 2022-08-14 NOTE — PLAN OF CARE
Problem: Adult Inpatient Plan of Care  Goal: Plan of Care Review  Outcome: Ongoing, Progressing  Flowsheets (Taken 8/14/2022 4207)  Progress: improving  Plan of Care Reviewed With: patient   Goal Outcome Evaluation:  Plan of Care Reviewed With: patient        Progress: improving

## 2022-08-14 NOTE — PROGRESS NOTES
[facility] Medicine Services  INPATIENT PROGRESS NOTE    Length of Stay: 5  Date of Admission: 8/9/2022  Primary Care Physician: Estrada Fuentes MD    Subjective     No acute events overnight.    Review of Systems     All pertinent negatives and positives are as above. All other systems have been reviewed and are negative unless otherwise stated.     Objective    Temp:  [97.1 °F (36.2 °C)-98.8 °F (37.1 °C)] 97.1 °F (36.2 °C)  Heart Rate:  [] 112  Resp:  [16-18] 16  BP: ()/(55-84) 128/64    Physical Exam  Constitutional:       General: He is not in acute distress.  HENT:      Head: Normocephalic and atraumatic.   Eyes:      Extraocular Movements: Extraocular movements intact.   Pulmonary:      Effort: Pulmonary effort is normal. No respiratory distress.   Abdominal:      General: There is no distension.      Palpations: Abdomen is soft.   Musculoskeletal:         General: No swelling.      Cervical back: No rigidity.   Neurological:      General: No focal deficit present.       Results Review:  I have reviewed the labs, radiology results, and diagnostic studies.    Laboratory Data:   Results from last 7 days   Lab Units 08/14/22  0436 08/13/22  0447 08/12/22  0618 08/09/22  1257 08/09/22  1130   SODIUM mmol/L 138 142 139   < > 138   POTASSIUM mmol/L 4.2 4.6 4.3   < > 6.4*   CHLORIDE mmol/L 105 107 105   < > 96*   CO2 mmol/L 24.0 26.0 25.0   < > 19.0*   BUN mg/dL 23 25* 31*   < > 78*   CREATININE mg/dL 0.52* 0.56* 0.64*   < > 3.46*   GLUCOSE mg/dL 137* 142* 130*   < > 210*   CALCIUM mg/dL 8.2* 8.1* 8.4*   < > 10.0   BILIRUBIN mg/dL  --   --   --   --  0.7   ALK PHOS U/L  --   --   --   --  139*   ALT (SGPT) U/L  --   --   --   --  40   AST (SGOT) U/L  --   --   --   --  30   ANION GAP mmol/L 9.0 9.0 9.0   < > 23.0*    < > = values in this interval not displayed.     Estimated Creatinine Clearance: 128.6 mL/min (A) (by C-G formula based on SCr of 0.52 mg/dL  (L)).  Results from last 7 days   Lab Units 08/09/22  1130   MAGNESIUM mg/dL 2.7*         Results from last 7 days   Lab Units 08/14/22  0436 08/13/22  0447 08/12/22  0618 08/11/22  0617 08/10/22  0242   WBC 10*3/mm3 14.48* 11.04* 9.99 13.32* 22.25*   HEMOGLOBIN g/dL 9.5* 9.5* 9.5* 9.6* 10.1*   HEMATOCRIT % 29.7* 30.0* 29.5* 29.6* 30.4*   PLATELETS 10*3/mm3 197 137* 175 180 244     Results from last 7 days   Lab Units 08/09/22  1129   INR  1.30*       Culture Data:   Blood Culture   Date Value Ref Range Status   08/12/2022 No growth at 24 hours  Preliminary   08/12/2022 No growth at 24 hours  Preliminary     No results found for: URINECX  No results found for: RESPCX  No results found for: WOUNDCX  No results found for: STOOLCX  No components found for: BODYFLD    Radiology Data:   Imaging Results (Last 24 Hours)     ** No results found for the last 24 hours. **          I have reviewed the patient's current medications.     Assessment/Plan     Active Hospital Problems    Diagnosis    • Sepsis with acute renal failure without septic shock, due to unspecified organism, unspecified acute renal failure type (HCC)        Plan:    Sepsis.  Likely source urinary.  Blood cultures positive for Proteus and Enterobacter.  Continue meropenem.  Off pressors.  On midodrine.     Urinary tract infection.  Urine culture positive for Proteus.  On meropenem.  Evaluated by urology for chronic urinary retention.  Continue Hester catheter.  May need suprapubic catheter placement next week.     COVID-19 diagnosed 2 weeks ago.  Asymptomatic.     DVT prophylaxis.  On subcutaneous heparin.    Awaiting transfer to the floor.          The patient was evaluated during the global COVID-19 pandemic, and the diagnosis was suspected/considered upon their initial presentation.  Evaluation, treatment, and testing were consistent with current guidelines for patients who present with complaints or symptoms that may be related to COVID-19.      Saquib  MD Axel

## 2022-08-15 ENCOUNTER — TRANSCRIBE ORDERS (OUTPATIENT)
Dept: HOME HEALTH SERVICES | Facility: CLINIC | Age: 72
End: 2022-08-15

## 2022-08-15 DIAGNOSIS — A41.9 SEPSIS, DUE TO UNSPECIFIED ORGANISM, UNSPECIFIED WHETHER ACUTE ORGAN DYSFUNCTION PRESENT: Primary | ICD-10-CM

## 2022-08-15 LAB
ANION GAP SERPL CALCULATED.3IONS-SCNC: 10 MMOL/L (ref 5–15)
BASOPHILS # BLD AUTO: 0.05 10*3/MM3 (ref 0–0.2)
BASOPHILS NFR BLD AUTO: 0.4 % (ref 0–1.5)
BUN SERPL-MCNC: 23 MG/DL (ref 8–23)
BUN/CREAT SERPL: 39.7 (ref 7–25)
CALCIUM SPEC-SCNC: 8.1 MG/DL (ref 8.6–10.5)
CHLORIDE SERPL-SCNC: 102 MMOL/L (ref 98–107)
CO2 SERPL-SCNC: 24 MMOL/L (ref 22–29)
CREAT SERPL-MCNC: 0.58 MG/DL (ref 0.76–1.27)
DEPRECATED RDW RBC AUTO: 44.6 FL (ref 37–54)
EGFRCR SERPLBLD CKD-EPI 2021: 103.6 ML/MIN/1.73
EOSINOPHIL # BLD AUTO: 0.28 10*3/MM3 (ref 0–0.4)
EOSINOPHIL NFR BLD AUTO: 2.1 % (ref 0.3–6.2)
ERYTHROCYTE [DISTWIDTH] IN BLOOD BY AUTOMATED COUNT: 16.1 % (ref 12.3–15.4)
GLUCOSE SERPL-MCNC: 165 MG/DL (ref 65–99)
HCT VFR BLD AUTO: 30 % (ref 37.5–51)
HGB BLD-MCNC: 9.6 G/DL (ref 13–17.7)
IMM GRANULOCYTES # BLD AUTO: 0.65 10*3/MM3 (ref 0–0.05)
IMM GRANULOCYTES NFR BLD AUTO: 4.8 % (ref 0–0.5)
LYMPHOCYTES # BLD AUTO: 1.17 10*3/MM3 (ref 0.7–3.1)
LYMPHOCYTES NFR BLD AUTO: 8.6 % (ref 19.6–45.3)
MCH RBC QN AUTO: 24.8 PG (ref 26.6–33)
MCHC RBC AUTO-ENTMCNC: 32 G/DL (ref 31.5–35.7)
MCV RBC AUTO: 77.5 FL (ref 79–97)
MONOCYTES # BLD AUTO: 0.85 10*3/MM3 (ref 0.1–0.9)
MONOCYTES NFR BLD AUTO: 6.3 % (ref 5–12)
NEUTROPHILS NFR BLD AUTO: 10.54 10*3/MM3 (ref 1.7–7)
NEUTROPHILS NFR BLD AUTO: 77.8 % (ref 42.7–76)
NRBC BLD AUTO-RTO: 0 /100 WBC (ref 0–0.2)
PLATELET # BLD AUTO: 232 10*3/MM3 (ref 140–450)
PMV BLD AUTO: 10.6 FL (ref 6–12)
POTASSIUM SERPL-SCNC: 4 MMOL/L (ref 3.5–5.2)
RBC # BLD AUTO: 3.87 10*6/MM3 (ref 4.14–5.8)
SODIUM SERPL-SCNC: 136 MMOL/L (ref 136–145)
WBC NRBC COR # BLD: 13.54 10*3/MM3 (ref 3.4–10.8)

## 2022-08-15 PROCEDURE — 25010000002 MEROPENEM PER 100 MG

## 2022-08-15 PROCEDURE — 36415 COLL VENOUS BLD VENIPUNCTURE: CPT | Performed by: STUDENT IN AN ORGANIZED HEALTH CARE EDUCATION/TRAINING PROGRAM

## 2022-08-15 PROCEDURE — 80048 BASIC METABOLIC PNL TOTAL CA: CPT | Performed by: STUDENT IN AN ORGANIZED HEALTH CARE EDUCATION/TRAINING PROGRAM

## 2022-08-15 PROCEDURE — 25010000002 HEPARIN (PORCINE) PER 1000 UNITS: Performed by: STUDENT IN AN ORGANIZED HEALTH CARE EDUCATION/TRAINING PROGRAM

## 2022-08-15 PROCEDURE — 85025 COMPLETE CBC W/AUTO DIFF WBC: CPT | Performed by: INTERNAL MEDICINE

## 2022-08-15 RX ADMIN — MIDODRINE HYDROCHLORIDE 5 MG: 5 TABLET ORAL at 10:49

## 2022-08-15 RX ADMIN — METOCLOPRAMIDE 5 MG: 5 TABLET ORAL at 10:49

## 2022-08-15 RX ADMIN — HEPARIN SODIUM 5000 UNITS: 5000 INJECTION INTRAVENOUS; SUBCUTANEOUS at 06:13

## 2022-08-15 RX ADMIN — METOCLOPRAMIDE 5 MG: 5 TABLET ORAL at 21:33

## 2022-08-15 RX ADMIN — HEPARIN SODIUM 5000 UNITS: 5000 INJECTION INTRAVENOUS; SUBCUTANEOUS at 21:33

## 2022-08-15 RX ADMIN — HEPARIN SODIUM 5000 UNITS: 5000 INJECTION INTRAVENOUS; SUBCUTANEOUS at 13:29

## 2022-08-15 RX ADMIN — MEROPENEM 1 G: 1 INJECTION, POWDER, FOR SOLUTION INTRAVENOUS at 00:07

## 2022-08-15 RX ADMIN — PANTOPRAZOLE SODIUM 40 MG: 40 INJECTION, POWDER, FOR SOLUTION INTRAVENOUS at 06:13

## 2022-08-15 RX ADMIN — Medication 10 ML: at 08:12

## 2022-08-15 RX ADMIN — METOCLOPRAMIDE 5 MG: 5 TABLET ORAL at 16:30

## 2022-08-15 RX ADMIN — MIDODRINE HYDROCHLORIDE 5 MG: 5 TABLET ORAL at 16:30

## 2022-08-15 RX ADMIN — METOCLOPRAMIDE 5 MG: 5 TABLET ORAL at 08:11

## 2022-08-15 RX ADMIN — MIDODRINE HYDROCHLORIDE 5 MG: 5 TABLET ORAL at 08:11

## 2022-08-15 RX ADMIN — MEROPENEM 1 G: 1 INJECTION, POWDER, FOR SOLUTION INTRAVENOUS at 13:28

## 2022-08-15 RX ADMIN — FLUOXETINE 20 MG: 20 CAPSULE ORAL at 08:11

## 2022-08-15 NOTE — PROGRESS NOTES
"   LOS: 6 days   Patient Care Team:  Estrada Fuentes MD as PCP - General (Family Medicine)    Subjective     Subjective:  Symptoms:  Stable.  (No Hester problems).        History taken from: patient chart    Objective     Vital Signs  Temp:  [98 °F (36.7 °C)-98.2 °F (36.8 °C)] 98.2 °F (36.8 °C)  Heart Rate:  [] 107  Resp:  [14-16] 16  BP: ()/(51-80) 126/65    Objective:  General Appearance:  In no acute distress.    Vital signs: (most recent): Blood pressure 126/65, pulse 107, temperature 98.2 °F (36.8 °C), temperature source Infrared, resp. rate 16, height 188 cm (74.02\"), weight 70.2 kg (154 lb 12.8 oz), SpO2 96 %.  No fever.    Output: Producing urine (Hester).    Chest: Symmetric chest wall expansion.   Abdomen: There is no abdominal tenderness.     Neurological: Patient is alert.    Skin:  Warm, dry and pale.            Results Review:    Lab Results (last 24 hours)     Procedure Component Value Units Date/Time    Blood Culture - Blood, Arm, Left [[id number]]  (Normal) Collected: 08/12/22 1137    Specimen: Blood from Arm, Left Updated: 08/15/22 1148     Blood Culture No growth at 3 days    Blood Culture - Blood, Arm, Right [[id number]]  (Normal) Collected: 08/12/22 1137    Specimen: Blood from Arm, Right Updated: 08/15/22 1148     Blood Culture No growth at 3 days    Basic Metabolic Panel [[id number]]  (Abnormal) Collected: 08/15/22 0553    Specimen: Blood Updated: 08/15/22 0631     Glucose 165 mg/dL      BUN 23 mg/dL      Creatinine 0.58 mg/dL      Sodium 136 mmol/L      Potassium 4.0 mmol/L      Chloride 102 mmol/L      CO2 24.0 mmol/L      Calcium 8.1 mg/dL      BUN/Creatinine Ratio 39.7     Anion Gap 10.0 mmol/L      eGFR 103.6 mL/min/1.73      Comment: National Kidney Foundation and American Society of Nephrology (ASN) Task Force recommended calculation based on the Chronic Kidney Disease Epidemiology Collaboration (CKD-EPI) equation refit without adjustment for race.       Narrative:      GFR " Normal >60  Chronic Kidney Disease <60  Kidney Failure <15      CBC & Differential [[id number]]  (Abnormal) Collected: 08/15/22 0553    Specimen: Blood Updated: 08/15/22 0612    Narrative:      The following orders were created for panel order CBC & Differential.  Procedure                               Abnormality         Status                     ---------                               -----------         ------                     CBC Auto Differential[[id number]]        Abnormal            Final result               Scan Slide[[id number]]                                                                    Please view results for these tests on the individual orders.    CBC Auto Differential [[id number]]  (Abnormal) Collected: 08/15/22 0553    Specimen: Blood Updated: 08/15/22 0612     WBC 13.54 10*3/mm3      RBC 3.87 10*6/mm3      Hemoglobin 9.6 g/dL      Hematocrit 30.0 %      MCV 77.5 fL      MCH 24.8 pg      MCHC 32.0 g/dL      RDW 16.1 %      RDW-SD 44.6 fl      MPV 10.6 fL      Platelets 232 10*3/mm3      Neutrophil % 77.8 %      Lymphocyte % 8.6 %      Monocyte % 6.3 %      Eosinophil % 2.1 %      Basophil % 0.4 %      Immature Grans % 4.8 %      Neutrophils, Absolute 10.54 10*3/mm3      Lymphocytes, Absolute 1.17 10*3/mm3      Monocytes, Absolute 0.85 10*3/mm3      Eosinophils, Absolute 0.28 10*3/mm3      Basophils, Absolute 0.05 10*3/mm3      Immature Grans, Absolute 0.65 10*3/mm3      nRBC 0.0 /100 WBC          Imaging Results (Last 24 Hours)     ** No results found for the last 24 hours. **           I reviewed the patient's new clinical results.  I reviewed the patient's new imaging results and agree with the interpretation.  I reviewed the patient's other test results and agree with the interpretation      Assessment & Plan       Sepsis with acute renal failure without septic shock, due to unspecified organism, unspecified acute renal failure type (HCC)      Assessment & Plan    8/15/22, spoke to his  daughter, she is speaking to her brother, they are considering if they would like their father to have SP tube versus chronic Hester.   Consulted to Urology for chronic retention of urine, treated for sepsis with shock from UTI, NELLIE resolved. Hester has been changed this admission. Afebrile.  -Estimated Creatinine Clearance: 114.3 mL/min (A) (by C-G formula based on SCr of 0.58 mg/dL (L)).    Plan:  Hester can be chronic and be changed monthly  I will speak to family again tomorrow    RASHAUN Toney  08/15/22  14:09 CDT

## 2022-08-15 NOTE — PROGRESS NOTES
[facility] Medicine Services  INPATIENT PROGRESS NOTE    Length of Stay: 6  Date of Admission: 8/9/2022  Primary Care Physician: Estrada Fuentes MD    Subjective   Chief Complaint: No current complaints  HPI:  Patient denies any current concerns or complaints.     Review of Systems   Constitutional: Negative for activity change, chills and fever.   Respiratory: Negative for shortness of breath.    Cardiovascular: Negative for chest pain and palpitations.   Gastrointestinal: Negative for abdominal pain.   Musculoskeletal: Negative.    Skin: Negative.    Psychiatric/Behavioral: Negative.    All other systems reviewed and are negative.     All pertinent negatives and positives are as above. All other systems have been reviewed and are negative unless otherwise stated.     Objective    Temp:  [98 °F (36.7 °C)-98.2 °F (36.8 °C)] 98.2 °F (36.8 °C)  Heart Rate:  [] 107  Resp:  [14-16] 16  BP: ()/(51-80) 126/65    Physical Exam  Constitutional:       General: He is not in acute distress.     Appearance: He is not toxic-appearing.   HENT:      Head: Normocephalic and atraumatic.      Right Ear: External ear normal.      Left Ear: External ear normal.      Nose: Nose normal.      Mouth/Throat:      Mouth: Mucous membranes are moist.      Pharynx: Oropharynx is clear.   Eyes:      Conjunctiva/sclera: Conjunctivae normal.   Cardiovascular:      Rate and Rhythm: Normal rate and regular rhythm.      Pulses: Normal pulses.      Heart sounds: Normal heart sounds.   Pulmonary:      Effort: Pulmonary effort is normal. No respiratory distress.      Breath sounds: Normal breath sounds.   Abdominal:      General: Bowel sounds are normal.      Palpations: Abdomen is soft.      Tenderness: There is no abdominal tenderness.   Musculoskeletal:         General: No deformity.   Skin:     General: Skin is warm and dry.      Capillary Refill: Capillary refill takes less than 2 seconds.    Neurological:      Mental Status: Mental status is at baseline.   Psychiatric:         Behavior: Behavior normal.         Results Review:  I have reviewed the labs, radiology results, and diagnostic studies.    Laboratory Data:   Results from last 7 days   Lab Units 08/15/22  0553 08/14/22  0436 08/13/22  0447 08/09/22  1257 08/09/22  1130   SODIUM mmol/L 136 138 142   < > 138   POTASSIUM mmol/L 4.0 4.2 4.6   < > 6.4*   CHLORIDE mmol/L 102 105 107   < > 96*   CO2 mmol/L 24.0 24.0 26.0   < > 19.0*   BUN mg/dL 23 23 25*   < > 78*   CREATININE mg/dL 0.58* 0.52* 0.56*   < > 3.46*   GLUCOSE mg/dL 165* 137* 142*   < > 210*   CALCIUM mg/dL 8.1* 8.2* 8.1*   < > 10.0   BILIRUBIN mg/dL  --   --   --   --  0.7   ALK PHOS U/L  --   --   --   --  139*   ALT (SGPT) U/L  --   --   --   --  40   AST (SGOT) U/L  --   --   --   --  30   ANION GAP mmol/L 10.0 9.0 9.0   < > 23.0*    < > = values in this interval not displayed.     Estimated Creatinine Clearance: 114.3 mL/min (A) (by C-G formula based on SCr of 0.58 mg/dL (L)).  Results from last 7 days   Lab Units 08/09/22  1130   MAGNESIUM mg/dL 2.7*         Results from last 7 days   Lab Units 08/15/22  0553 08/14/22  0436 08/13/22  0447 08/12/22  0618 08/11/22  0617   WBC 10*3/mm3 13.54* 14.48* 11.04* 9.99 13.32*   HEMOGLOBIN g/dL 9.6* 9.5* 9.5* 9.5* 9.6*   HEMATOCRIT % 30.0* 29.7* 30.0* 29.5* 29.6*   PLATELETS 10*3/mm3 232 197 137* 175 180     Results from last 7 days   Lab Units 08/09/22  1129   INR  1.30*       Culture Data:   No results found for: BLOODCX  No results found for: URINECX  No results found for: RESPCX  No results found for: WOUNDCX  No results found for: STOOLCX  No components found for: BODYFLD    Radiology Data:   Imaging Results (Last 24 Hours)     ** No results found for the last 24 hours. **          I have reviewed the patient's current medications.     Assessment/Plan     Active Problems:    Sepsis with acute renal failure without septic shock, due to  unspecified organism, unspecified acute renal failure type (HCC)  Proteus bacteremia  Sepsis secondary to urinary tract infection  Recent COVID-19 infection    Plan:  - Continue supportive care and meropenem for now  - We will plan on stopping meropenem after today as he will completed 7 days of therapy, repeat blood cultures are also no growth to date  - Continue home medications as appropriate  - Review of the chart shows that the plan is to have the patient return back to SNF with hospice care  - Appreciate urology's assistance  - DVT prophylaxis with heparin  - CODE STATUS: DNR/DNI      I confirmed that the patient's Advance Care Plan is present, code status is documented, or surrogate decision maker is listed in the patient's medical record.     Discharge Planning: In process, possibly tomorrow.    Bahman Hinton MD

## 2022-08-16 ENCOUNTER — HOSPICE ADMISSION (OUTPATIENT)
Dept: HOSPICE | Facility: HOSPICE | Age: 72
End: 2022-08-16

## 2022-08-16 VITALS
RESPIRATION RATE: 16 BRPM | HEIGHT: 74 IN | OXYGEN SATURATION: 96 % | WEIGHT: 148.1 LBS | DIASTOLIC BLOOD PRESSURE: 58 MMHG | HEART RATE: 61 BPM | BODY MASS INDEX: 19.01 KG/M2 | SYSTOLIC BLOOD PRESSURE: 117 MMHG | TEMPERATURE: 98 F

## 2022-08-16 PROBLEM — N17.9 AKI (ACUTE KIDNEY INJURY): Status: ACTIVE | Noted: 2022-08-16

## 2022-08-16 LAB
ANION GAP SERPL CALCULATED.3IONS-SCNC: 11 MMOL/L (ref 5–15)
ANISOCYTOSIS BLD QL: ABNORMAL
BUN SERPL-MCNC: 24 MG/DL (ref 8–23)
BUN/CREAT SERPL: 46.2 (ref 7–25)
CALCIUM SPEC-SCNC: 8.2 MG/DL (ref 8.6–10.5)
CHLORIDE SERPL-SCNC: 101 MMOL/L (ref 98–107)
CO2 SERPL-SCNC: 23 MMOL/L (ref 22–29)
CREAT SERPL-MCNC: 0.52 MG/DL (ref 0.76–1.27)
DEPRECATED RDW RBC AUTO: 44.7 FL (ref 37–54)
EGFRCR SERPLBLD CKD-EPI 2021: 107.1 ML/MIN/1.73
EOSINOPHIL # BLD MANUAL: 0.49 10*3/MM3 (ref 0–0.4)
EOSINOPHIL NFR BLD MANUAL: 4 % (ref 0.3–6.2)
ERYTHROCYTE [DISTWIDTH] IN BLOOD BY AUTOMATED COUNT: 16.2 % (ref 12.3–15.4)
GLUCOSE SERPL-MCNC: 136 MG/DL (ref 65–99)
HCT VFR BLD AUTO: 29.2 % (ref 37.5–51)
HGB BLD-MCNC: 9.3 G/DL (ref 13–17.7)
LYMPHOCYTES # BLD MANUAL: 2.08 10*3/MM3 (ref 0.7–3.1)
LYMPHOCYTES NFR BLD MANUAL: 4 % (ref 5–12)
MCH RBC QN AUTO: 24.7 PG (ref 26.6–33)
MCHC RBC AUTO-ENTMCNC: 31.8 G/DL (ref 31.5–35.7)
MCV RBC AUTO: 77.7 FL (ref 79–97)
MONOCYTES # BLD: 0.49 10*3/MM3 (ref 0.1–0.9)
NEUTROPHILS # BLD AUTO: 9.17 10*3/MM3 (ref 1.7–7)
NEUTROPHILS NFR BLD MANUAL: 67 % (ref 42.7–76)
NEUTS BAND NFR BLD MANUAL: 8 % (ref 0–5)
PLATELET # BLD AUTO: 235 10*3/MM3 (ref 140–450)
PMV BLD AUTO: 10.6 FL (ref 6–12)
POTASSIUM SERPL-SCNC: 4.4 MMOL/L (ref 3.5–5.2)
RBC # BLD AUTO: 3.76 10*6/MM3 (ref 4.14–5.8)
SARS-COV-2 N GENE RESP QL NAA+PROBE: NOT DETECTED
SMALL PLATELETS BLD QL SMEAR: ADEQUATE
SODIUM SERPL-SCNC: 135 MMOL/L (ref 136–145)
VARIANT LYMPHS NFR BLD MANUAL: 17 % (ref 19.6–45.3)
WBC MORPH BLD: NORMAL
WBC NRBC COR # BLD: 12.22 10*3/MM3 (ref 3.4–10.8)

## 2022-08-16 PROCEDURE — 87635 SARS-COV-2 COVID-19 AMP PRB: CPT | Performed by: FAMILY MEDICINE

## 2022-08-16 PROCEDURE — 25010000002 HEPARIN (PORCINE) PER 1000 UNITS: Performed by: STUDENT IN AN ORGANIZED HEALTH CARE EDUCATION/TRAINING PROGRAM

## 2022-08-16 PROCEDURE — 80048 BASIC METABOLIC PNL TOTAL CA: CPT | Performed by: STUDENT IN AN ORGANIZED HEALTH CARE EDUCATION/TRAINING PROGRAM

## 2022-08-16 PROCEDURE — 85025 COMPLETE CBC W/AUTO DIFF WBC: CPT | Performed by: STUDENT IN AN ORGANIZED HEALTH CARE EDUCATION/TRAINING PROGRAM

## 2022-08-16 PROCEDURE — 85007 BL SMEAR W/DIFF WBC COUNT: CPT | Performed by: STUDENT IN AN ORGANIZED HEALTH CARE EDUCATION/TRAINING PROGRAM

## 2022-08-16 RX ORDER — HYDROCODONE BITARTRATE AND ACETAMINOPHEN 5; 325 MG/1; MG/1
1 TABLET ORAL EVERY 6 HOURS PRN
Qty: 4 TABLET | Refills: 0 | Status: SHIPPED | OUTPATIENT
Start: 2022-08-16

## 2022-08-16 RX ORDER — ALBUTEROL SULFATE 2.5 MG/3ML
2.5 SOLUTION RESPIRATORY (INHALATION) EVERY 6 HOURS PRN
Refills: 12
Start: 2022-08-16 | End: 2022-08-23

## 2022-08-16 RX ORDER — MIDODRINE HYDROCHLORIDE 5 MG/1
5 TABLET ORAL
Qty: 90 TABLET | Refills: 0 | Status: SHIPPED | OUTPATIENT
Start: 2022-08-16 | End: 2022-08-23

## 2022-08-16 RX ADMIN — ZINC OXIDE 1 APPLICATION: 200 OINTMENT TOPICAL at 08:33

## 2022-08-16 RX ADMIN — METOCLOPRAMIDE 5 MG: 5 TABLET ORAL at 11:02

## 2022-08-16 RX ADMIN — HEPARIN SODIUM 5000 UNITS: 5000 INJECTION INTRAVENOUS; SUBCUTANEOUS at 06:03

## 2022-08-16 RX ADMIN — FLUOXETINE 20 MG: 20 CAPSULE ORAL at 08:28

## 2022-08-16 RX ADMIN — METOCLOPRAMIDE 5 MG: 5 TABLET ORAL at 08:28

## 2022-08-16 RX ADMIN — HEPARIN SODIUM 5000 UNITS: 5000 INJECTION INTRAVENOUS; SUBCUTANEOUS at 13:22

## 2022-08-16 RX ADMIN — PANTOPRAZOLE SODIUM 40 MG: 40 INJECTION, POWDER, FOR SOLUTION INTRAVENOUS at 06:03

## 2022-08-16 RX ADMIN — Medication 10 ML: at 08:33

## 2022-08-16 RX ADMIN — MIDODRINE HYDROCHLORIDE 5 MG: 5 TABLET ORAL at 08:28

## 2022-08-16 RX ADMIN — MIDODRINE HYDROCHLORIDE 5 MG: 5 TABLET ORAL at 11:02

## 2022-08-16 NOTE — PROGRESS NOTES
"Physicians Statement of Medical Necessity for  Ambulance Transportation    GENERAL INFORMATION     Name: Fer Ivan  YOB: 1950  Medicare #: [id number]  Transport Date: 8/16/2022 (Valid for round trips this date, or for scheduled repetitive trips for 60 days from the date signed below.)  Origin: [facility]  Destination: [facility]  Is the Patient's stay covered under Medicare Part A (PPS/DRG?)Yes  Closest appropriate facility? Yes  If this a hosp-hosp transfer? No  Is this a hospice patient? No    MEDICAL NECESSITY QUESTIONAIRE    Ambulance Transportation is medically necessary only if other means of transportation are contraindicated or would be potentially harmful to the patient.  To meet this requirement, the patient must be either \"bed confined\" or suffer from a condition such that transport by means other than an ambulance is contraindicated by the patient's condition.  The following questions must be answered by the healthcare professional signing below for this form to be valid:     1) Describe the MEDICAL CONDITION (physical and/or mental) of this patient AT THE TIME OF AMBULANCE TRANSPORT that requires the patient to be transported in an ambulance, and why transport by other means is contraindicated by the patient's condition:  Patient is bed confined related to recent stroke with residual weakness to left side. Confusion      2) Is this patient \"bed confined\" as defined below?Yes   To be \"bed confined\" the patient must satisfy all three of the following criteria:  (1) unable to get up from bed without assistance; AND (2) unable to ambulate;  AND (3) unable to sit in a chair or wheelchair.  3) Can this patient safely be transported by car or wheelchair van (I.e., may safely sit during transport, without an attendant or monitoring?)No   4. In addition to completing questions 1-3 above, please check any of the following conditions that apply*:          *Note: " supporting documentation for any boxes checked must be maintained in the patient's medical records Patient is confused, Moderate/severe pain on movement, Medical attendant required and Unable to tolerate seated position for time needed to transport      SIGNATURE OF PHYSICIAN OR OTHER AUTHORIZED HEALTHCARE PROFESSIONAL    I certify that the above information is true and correct based on my evaluation of this patient, and represent that the patient requires transport by ambulance and that other forms of transport are contraindicated.  I understand that this information will be used by the Centers for Medicare and Medicaid Services (CMS) to support the determiniation of medical necessity for ambulance services, and I represent that I have personal knowledge of the patient's condition at the time of transport.       If this box is checked, I also certify that the patient is physically or mentally incapable of signing the ambulance service's claim form and that the institution with which I am affiliated has furnished care, services or assistance to the patient.  My signature below is made on behalf of the patient pursuant to 42 .36(b)(4). In accordance with 42 .37, the specific reason(s) that the patient is physically or mentally incapable of signing the claim for is as follows:     Signature of Physician or Healthcare Professional   Deanen Adair RN,[facility] Date/Time:   8/16/2022 6126     (For Scheduled repetitive transport, this form is not valid for transports performed more than 60 days after this date).                                                                                                                                            --------------------------------------------------------------------------------------------  Printed Name and Credentials of Physician or Authorized Healthcare Professional     *Form must be signed by patient's attending physician for scheduled, repetitive transports,.   For non-repetitive ambulance transports, if unable to obtain the signature of the attending physician, any of the following may sign (please select below):     Physician  Clinical Nurse Specialist X Registered Nurse     Physician Assistant  Discharge Planner  Licensed Practical Nurse     Nurse Practitioner X

## 2022-08-16 NOTE — PROGRESS NOTES
"   LOS: 7 days   Patient Care Team:  Estrada Fuentes MD as PCP - General (Family Medicine)  Michelle Kohler MD as PCP - Hospice Attending    Subjective     Subjective:  Symptoms:  Stable.  (No Hester problems, no urethral discharge, denies penile pain).        History taken from: patient chart    Objective     Vital Signs  Temp:  [97.9 °F (36.6 °C)-98.4 °F (36.9 °C)] 97.9 °F (36.6 °C)  Heart Rate:  [103-118] 104  Resp:  [16] 16  BP: (103-130)/(51-75) 103/51    Objective:  General Appearance:  In no acute distress.    Vital signs: (most recent): Blood pressure 103/51, pulse 104, temperature 97.9 °F (36.6 °C), temperature source Temporal, resp. rate 16, height 188 cm (74.02\"), weight 67.2 kg (148 lb 1.6 oz), SpO2 96 %.  No fever.    Output: Producing urine (Hester, yellow urine).    Lungs:  Normal effort.  He is not in respiratory distress.    Chest: Symmetric chest wall expansion.   Abdomen: There is no abdominal tenderness.     Neurological: Patient is alert.    Skin:  Warm, dry and pale.              Results Review:    Lab Results (last 24 hours)     Procedure Component Value Units Date/Time    COVID PRE-OP / PRE-PROCEDURE SCREENING ORDER (NO ISOLATION) - Swab, Nasopharynx [[id number]] Collected: 08/16/22 1000    Specimen: Swab from Nasopharynx Updated: 08/16/22 1007    Narrative:      The following orders were created for panel order COVID PRE-OP / PRE-PROCEDURE SCREENING ORDER (NO ISOLATION) - Swab, Nasopharynx.  Procedure                               Abnormality         Status                     ---------                               -----------         ------                     COVID-[facility] IN-HOUS...[[id number]]                      In process                   Please view results for these tests on the individual orders.    COVID-[facility] IN-HOUSE, NP SWAB IN TRANSPORT MEDIA 8-10 HR TAT - Swab, Nasopharynx [[id number]] Collected: 08/16/22 1000    Specimen: Swab from Nasopharynx Updated: 08/16/22 1007 "    Manual Differential [[id number]]  (Abnormal) Collected: 08/16/22 0518    Specimen: Blood Updated: 08/16/22 0732     Neutrophil % 67.0 %      Lymphocyte % 17.0 %      Monocyte % 4.0 %      Eosinophil % 4.0 %      Bands %  8.0 %      Neutrophils Absolute 9.17 10*3/mm3      Lymphocytes Absolute 2.08 10*3/mm3      Monocytes Absolute 0.49 10*3/mm3      Eosinophils Absolute 0.49 10*3/mm3      Anisocytosis Slight/1+     WBC Morphology Normal     Platelet Estimate Adequate    Basic Metabolic Panel [[id number]]  (Abnormal) Collected: 08/16/22 0518    Specimen: Blood Updated: 08/16/22 0649     Glucose 136 mg/dL      BUN 24 mg/dL      Creatinine 0.52 mg/dL      Sodium 135 mmol/L      Potassium 4.4 mmol/L      Chloride 101 mmol/L      CO2 23.0 mmol/L      Calcium 8.2 mg/dL      BUN/Creatinine Ratio 46.2     Anion Gap 11.0 mmol/L      eGFR 107.1 mL/min/1.73      Comment: National Kidney Foundation and American Society of Nephrology (ASN) Task Force recommended calculation based on the Chronic Kidney Disease Epidemiology Collaboration (CKD-EPI) equation refit without adjustment for race.       Narrative:      GFR Normal >60  Chronic Kidney Disease <60  Kidney Failure <15      CBC Auto Differential [[id number]]  (Abnormal) Collected: 08/16/22 0518    Specimen: Blood Updated: 08/16/22 0638     WBC 12.22 10*3/mm3      RBC 3.76 10*6/mm3      Hemoglobin 9.3 g/dL      Hematocrit 29.2 %      MCV 77.7 fL      MCH 24.7 pg      MCHC 31.8 g/dL      RDW 16.2 %      RDW-SD 44.7 fl      MPV 10.6 fL      Platelets 235 10*3/mm3     Blood Culture - Blood, Arm, Left [[id number]]  (Normal) Collected: 08/12/22 1137    Specimen: Blood from Arm, Left Updated: 08/15/22 1148     Blood Culture No growth at 3 days    Blood Culture - Blood, Arm, Right [[id number]]  (Normal) Collected: 08/12/22 1137    Specimen: Blood from Arm, Right Updated: 08/15/22 1148     Blood Culture No growth at 3 days         Imaging Results (Last 24 Hours)     ** No results found  for the last 24 hours. **           I reviewed the patient's new clinical results.  I reviewed the patient's new imaging results and agree with the interpretation.  I reviewed the patient's other test results and agree with the interpretation      Assessment & Plan       Sepsis with acute renal failure without septic shock, due to unspecified organism, unspecified acute renal failure type (HCC)      Assessment & Plan    --8/16/22 spoke to his son today, they would like to keep Hester and not move forward with suprapubic catheter at this time but may consider it in the future. Patient has no Hester problems, no hematuria. Urine output is adequate.   --8/15/22, spoke to his daughter, she is speaking to her brother, they are considering if they would like their father to have SP tube versus chronic Hester.   Consulted to Urology for chronic retention of urine, treated for sepsis with shock from UTI, NELLIE resolved. Hester has been changed this admission. Afebrile.  -Estimated Creatinine Clearance: 122.1 mL/min (A) (by C-G formula based on SCr of 0.52 mg/dL (L)).    Plan:  Change Hester every 4 weeks.   See Dr. Matthews if suprapubic catheter is desired.   Urology will sign off, call if needed.     RASHAUN Toney  08/16/22  11:13 CDT

## 2022-08-16 NOTE — DISCHARGE SUMMARY
[facility]  DISCHARGE SUMMARY       Date of Admission: 8/9/2022  Date of Discharge:  8/16/2022  Primary Care Physician: Estrada Fuentes MD    Presenting Problem/History of Present Illness:  Hyperkalemia [E87.5]  Hypotension, unspecified hypotension type [I95.9]  Urinary tract infection with hematuria, site unspecified [N39.0, R31.9]  Acute renal failure, unspecified acute renal failure type (HCC) [N17.9]  Sepsis with acute renal failure without septic shock, due to unspecified organism, unspecified acute renal failure type (HCC) [A41.9, R65.20, N17.9]     Final Discharge Diagnoses:  Active Hospital Problems    Diagnosis    • NELLIE (acute kidney injury) (HCC)    • Sepsis with acute renal failure without septic shock, due to unspecified organism, unspecified acute renal failure type (HCC)    • Urinary tract infection associated with indwelling urethral catheter (HCC)        Consults:   Consults     Date and Time Order Name Status Description    8/10/2022 11:18 AM Inpatient Urology Consult Completed     8/9/2022  2:10 PM Inpatient Nephrology Consult Completed     8/9/2022  1:53 PM Hospitalist (on-call MD unless specified)            Procedures Performed:                 Pertinent Test Results:   Lab Results (most recent)     Procedure Component Value Units Date/Time    COVID PRE-OP / PRE-PROCEDURE SCREENING ORDER (NO ISOLATION) - Swab, Nasopharynx [[id number]]  (Normal) Collected: 08/16/22 1000    Specimen: Swab from Nasopharynx Updated: 08/16/22 1340    Narrative:      The following orders were created for panel order COVID PRE-OP / PRE-PROCEDURE SCREENING ORDER (NO ISOLATION) - Swab, Nasopharynx.  Procedure                               Abnormality         Status                     ---------                               -----------         ------                     COVID-19, [facility] IN-HOUS...[[id number]]  Normal              Final result                  Please view results for these tests on the individual orders.    COVID-19, [facility] IN-HOUSE, NP SWAB IN TRANSPORT MEDIA 8-10 HR TAT - Swab, Nasopharynx [[id number]]  (Normal) Collected: 08/16/22 1000    Specimen: Swab from Nasopharynx Updated: 08/16/22 1340     COVID19 Not Detected    Narrative:      Testing performed by Real Time RT-PCR  This test has not been approved by the [facility] but is authorized under the Emergency Use Act (EUA)    Fact sheet for providers: https://www.fda.gov/media/390767/download    Fact sheet for patients: https://www.fda.gov/media/547352/download        Blood Culture - Blood, Arm, Left [[id number]]  (Normal) Collected: 08/12/22 1137    Specimen: Blood from Arm, Left Updated: 08/16/22 1147     Blood Culture No growth at 4 days    Blood Culture - Blood, Arm, Right [[id number]]  (Normal) Collected: 08/12/22 1137    Specimen: Blood from Arm, Right Updated: 08/16/22 1147     Blood Culture No growth at 4 days    Manual Differential [[id number]]  (Abnormal) Collected: 08/16/22 0518    Specimen: Blood Updated: 08/16/22 0732     Neutrophil % 67.0 %      Lymphocyte % 17.0 %      Monocyte % 4.0 %      Eosinophil % 4.0 %      Bands %  8.0 %      Neutrophils Absolute 9.17 10*3/mm3      Lymphocytes Absolute 2.08 10*3/mm3      Monocytes Absolute 0.49 10*3/mm3      Eosinophils Absolute 0.49 10*3/mm3      Anisocytosis Slight/1+     WBC Morphology Normal     Platelet Estimate Adequate    Basic Metabolic Panel [[id number]]  (Abnormal) Collected: 08/16/22 0518    Specimen: Blood Updated: 08/16/22 0649     Glucose 136 mg/dL      BUN 24 mg/dL      Creatinine 0.52 mg/dL      Sodium 135 mmol/L      Potassium 4.4 mmol/L      Chloride 101 mmol/L      CO2 23.0 mmol/L      Calcium 8.2 mg/dL      BUN/Creatinine Ratio 46.2     Anion Gap 11.0 mmol/L      eGFR 107.1 mL/min/1.73      Comment: National Kidney Foundation and American Society of Nephrology (ASN) Task Force recommended calculation based on the Chronic Kidney  Disease Epidemiology Collaboration (CKD-EPI) equation refit without adjustment for race.       Narrative:      GFR Normal >60  Chronic Kidney Disease <60  Kidney Failure <15      CBC Auto Differential [[id number]]  (Abnormal) Collected: 08/16/22 0518    Specimen: Blood Updated: 08/16/22 0638     WBC 12.22 10*3/mm3      RBC 3.76 10*6/mm3      Hemoglobin 9.3 g/dL      Hematocrit 29.2 %      MCV 77.7 fL      MCH 24.7 pg      MCHC 31.8 g/dL      RDW 16.2 %      RDW-SD 44.7 fl      MPV 10.6 fL      Platelets 235 10*3/mm3     Basic Metabolic Panel [[id number]]  (Abnormal) Collected: 08/15/22 0553    Specimen: Blood Updated: 08/15/22 0631     Glucose 165 mg/dL      BUN 23 mg/dL      Creatinine 0.58 mg/dL      Sodium 136 mmol/L      Potassium 4.0 mmol/L      Chloride 102 mmol/L      CO2 24.0 mmol/L      Calcium 8.1 mg/dL      BUN/Creatinine Ratio 39.7     Anion Gap 10.0 mmol/L      eGFR 103.6 mL/min/1.73      Comment: National Kidney Foundation and American Society of Nephrology (ASN) Task Force recommended calculation based on the Chronic Kidney Disease Epidemiology Collaboration (CKD-EPI) equation refit without adjustment for race.       Narrative:      GFR Normal >60  Chronic Kidney Disease <60  Kidney Failure <15      CBC & Differential [[id number]]  (Abnormal) Collected: 08/15/22 0553    Specimen: Blood Updated: 08/15/22 0612    Narrative:      The following orders were created for panel order CBC & Differential.  Procedure                               Abnormality         Status                     ---------                               -----------         ------                     CBC Auto Differential[[id number]]        Abnormal            Final result               Scan Slide[[id number]]                                                                    Please view results for these tests on the individual orders.    CBC Auto Differential [[id number]]  (Abnormal) Collected: 08/15/22 0553    Specimen: Blood Updated:  08/15/22 0612     WBC 13.54 10*3/mm3      RBC 3.87 10*6/mm3      Hemoglobin 9.6 g/dL      Hematocrit 30.0 %      MCV 77.5 fL      MCH 24.8 pg      MCHC 32.0 g/dL      RDW 16.1 %      RDW-SD 44.6 fl      MPV 10.6 fL      Platelets 232 10*3/mm3      Neutrophil % 77.8 %      Lymphocyte % 8.6 %      Monocyte % 6.3 %      Eosinophil % 2.1 %      Basophil % 0.4 %      Immature Grans % 4.8 %      Neutrophils, Absolute 10.54 10*3/mm3      Lymphocytes, Absolute 1.17 10*3/mm3      Monocytes, Absolute 0.85 10*3/mm3      Eosinophils, Absolute 0.28 10*3/mm3      Basophils, Absolute 0.05 10*3/mm3      Immature Grans, Absolute 0.65 10*3/mm3      nRBC 0.0 /100 WBC     Urine Culture - Urine, Urine, Clean Catch [[id number]]  (Abnormal)  (Susceptibility) Collected: 08/09/22 1142    Specimen: Urine, Clean Catch Updated: 08/11/22 1058     Urine Culture >100,000 CFU/mL Proteus mirabilis    Narrative:      Colonization of the urinary tract without infection is common. Treatment is discouraged unless the patient is symptomatic, pregnant, or undergoing an invasive urologic procedure.    Susceptibility      Proteus mirabilis      CHRISTIANNE      Ampicillin Susceptible      Ampicillin + Sulbactam Susceptible      Cefazolin Susceptible      Cefepime Susceptible      Ceftazidime Susceptible      Ceftriaxone Susceptible      Gentamicin Susceptible      Levofloxacin Resistant     Nitrofurantoin Resistant     Piperacillin + Tazobactam Susceptible      Trimethoprim + Sulfamethoxazole Resistant                   Linear View                   Protein / Creatinine Ratio, Urine - Urine, Clean Catch [[id number]]  (Abnormal) Collected: 08/09/22 1142    Specimen: Urine, Clean Catch Updated: 08/10/22 0916     Protein/Creatinine Ratio, Urine 15,520.9 mg/G Crea      Creatinine, Urine 38.2 mg/dL      Total Protein, Urine 592.9 mg/dL     Blood Culture ID, PCR - Blood, Hand, Right [[id number]]  (Abnormal) Collected: 08/09/22 1222    Specimen: Blood from Hand, Right  Updated: 08/10/22 0525     BCID, PCR Enterobacterales spp. Identification by BCID2 PCR.     BCID, PCR 2 Proteus spp. Identification by BCID2 PCR.    STAT Lactic Acid, Reflex [[id number]]  (Normal) Collected: 08/10/22 0242    Specimen: Blood Updated: 08/10/22 0257     Lactate 1.6 mmol/L     STAT Lactic Acid, Reflex [[id number]]  (Abnormal) Collected: 08/09/22 2049    Specimen: Blood Updated: 08/09/22 2144     Lactate 2.5 mmol/L     Sodium, Urine, Random - Urine, Clean Catch [[id number]] Collected: 08/09/22 1142    Specimen: Urine, Clean Catch Updated: 08/09/22 1614     Sodium, Urine 133 mmol/L     Narrative:      Reference intervals for random urine have not been established.  Clinical usage is dependent upon physician's interpretation in combination with other laboratory tests.       Extra Tubes [[id number]] Collected: 08/09/22 1130    Specimen: Blood, Venous Line Updated: 08/09/22 1533    Narrative:      The following orders were created for panel order Extra Tubes.  Procedure                               Abnormality         Status                     ---------                               -----------         ------                     Felix Top[[id number]]                                         Final result                 Please view results for these tests on the individual orders.    Felix Top [[id number]] Collected: 08/09/22 1130    Specimen: Blood Updated: 08/09/22 1533     Extra Tube Hold for add-ons.     Comment: Auto resulted.       Procalcitonin [[id number]]  (Abnormal) Collected: 08/09/22 1257    Specimen: Blood Updated: 08/09/22 1529     Procalcitonin 54.88 ng/mL     Narrative:      As a Marker for Sepsis (Non-Neonates):    1. <0.5 ng/mL represents a low risk of severe sepsis and/or septic shock.  2. >2 ng/mL represents a high risk of severe sepsis and/or septic shock.    As a Marker for Lower Respiratory Tract Infections that require antibiotic therapy:    PCT on Admission    Antibiotic Therapy       6-12  "Hrs later    >0.5                Strongly Recommended  >0.25 - <0.5        Recommended   0.1 - 0.25          Discouraged              Remeasure/reassess PCT  <0.1                Strongly Discouraged     Remeasure/reassess PCT    As 28 day mortality risk marker: \"Change in Procalcitonin Result\" (>80% or <=80%) if Day 0 (or Day 1) and Day 4 values are available. Refer to http://[email or web address]pct-[email or web address]    Change in PCT <=80%  A decrease of PCT levels below or equal to 80% defines a positive change in PCT test result representing a higher risk for 28-day all-cause mortality of patients diagnosed with severe sepsis for septic shock.    Change in PCT >80%  A decrease of PCT levels of more than 80% defines a negative change in PCT result representing a lower risk for 28-day all-cause mortality of patients diagnosed with severe sepsis or septic shock.       Extra Tubes [[id number]] Collected: 08/09/22 1257    Specimen: Blood, Venous Line Updated: 08/09/22 1403    Narrative:      The following orders were created for panel order Extra Tubes.  Procedure                               Abnormality         Status                     ---------                               -----------         ------                     Lavender Top[[id number]]                                     Final result               Gold Top - SST[[id number]]                                   Final result                 Please view results for these tests on the individual orders.    Lavender Top [[id number]] Collected: 08/09/22 1257    Specimen: Blood Updated: 08/09/22 1403     Extra Tube hold for add-on     Comment: Auto resulted       Gold Top - SST [[id number]] Collected: 08/09/22 1257    Specimen: Blood Updated: 08/09/22 1403     Extra Tube Hold for add-ons.     Comment: Auto resulted.       Troponin [[id number]]  (Abnormal) Collected: 08/09/22 1257    Specimen: Blood Updated: 08/09/22 1337     Troponin T 0.120 ng/mL     Narrative:      Troponin T " Reference Range:  <= 0.03 ng/mL-   Negative for AMI  >0.03 ng/mL-     Abnormal for myocardial necrosis.  Clinicians would have to utilize clinical acumen, EKG, Troponin and serial changes to determine if it is an Acute Myocardial Infarction or myocardial injury due to an underlying chronic condition.       Results may be falsely decreased if patient taking Biotin.      Potassium [[id number]]  (Abnormal) Collected: 08/09/22 1257    Specimen: Blood from Arm, Left Updated: 08/09/22 1329     Potassium 5.9 mmol/L      Comment: Slight hemolysis detected by analyzer. Results may be affected.       Troponin [[id number]]  (Abnormal) Collected: 08/09/22 1130    Specimen: Blood Updated: 08/09/22 1311     Troponin T 0.189 ng/mL     Narrative:      Troponin T Reference Range:  <= 0.03 ng/mL-   Negative for AMI  >0.03 ng/mL-     Abnormal for myocardial necrosis.  Clinicians would have to utilize clinical acumen, EKG, Troponin and serial changes to determine if it is an Acute Myocardial Infarction or myocardial injury due to an underlying chronic condition.       Results may be falsely decreased if patient taking Biotin.      CK [[id number]]  (Normal) Collected: 08/09/22 1130    Specimen: Blood Updated: 08/09/22 1310     Creatine Kinase 47 U/L     Magnesium [[id number]]  (Abnormal) Collected: 08/09/22 1130    Specimen: Blood Updated: 08/09/22 1310     Magnesium 2.7 mg/dL     BNP [[id number]]  (Abnormal) Collected: 08/09/22 1130    Specimen: Blood Updated: 08/09/22 1309     proBNP 29,409.0 pg/mL     Narrative:      Among patients with dyspnea, NT-proBNP is highly sensitive for the detection of acute congestive heart failure. In addition NT-proBNP of <300 pg/ml effectively rules out acute congestive heart failure with 99% negative predictive value.    Results may be falsely decreased if patient taking Biotin.      Lactic Acid, Plasma [[id number]]  (Abnormal) Collected: 08/09/22 1130    Specimen: Blood Updated: 08/09/22 1236     Lactate  5.3 mmol/L     Urinalysis, Microscopic Only - Urine, Clean Catch [[id number]]  (Abnormal) Collected: 08/09/22 1142    Specimen: Urine, Clean Catch Updated: 08/09/22 1235     RBC, UA 3-5 /HPF      WBC, UA Too Numerous to Count /HPF      Bacteria, UA 4+ /HPF      Squamous Epithelial Cells, UA None Seen /HPF      Hyaline Casts, UA None Seen /LPF      Methodology Manual Light Microscopy    [place] Draw [[id number]] Collected: 08/09/22 1129    Specimen: Blood Updated: 08/09/22 1233    Narrative:      The following orders were created for panel order [place] Draw.  Procedure                               Abnormality         Status                     ---------                               -----------         ------                     Green Top (Gel)[[id number]]                                  Final result               Lavender Top[[id number]]                                     Final result               Gold Top - SST[[id number]]                                   Final result               Light Blue Top[[id number]]                                   Final result                 Please view results for these tests on the individual orders.    Green Top (Gel) [[id number]] Collected: 08/09/22 1130    Specimen: Blood Updated: 08/09/22 1233     Extra Tube Hold for add-ons.     Comment: Auto resulted.       Lavender Top [[id number]] Collected: 08/09/22 1130    Specimen: Blood Updated: 08/09/22 1233     Extra Tube hold for add-on     Comment: Auto resulted       Gold Top - SST [[id number]] Collected: 08/09/22 1129    Specimen: Blood Updated: 08/09/22 1233     Extra Tube Hold for add-ons.     Comment: Auto resulted.       Light Blue Top [[id number]] Collected: 08/09/22 1129    Specimen: Blood Updated: 08/09/22 1233     Extra Tube Hold for add-ons.     Comment: Auto resulted       Comprehensive Metabolic Panel [[id number]]  (Abnormal) Collected: 08/09/22 1130    Specimen: Blood Updated: 08/09/22 1228     Glucose 210 mg/dL      BUN 78  mg/dL      Creatinine 3.46 mg/dL      Sodium 138 mmol/L      Potassium 6.4 mmol/L      Chloride 96 mmol/L      CO2 19.0 mmol/L      Calcium 10.0 mg/dL      Total Protein 8.2 g/dL      Albumin 4.10 g/dL      ALT (SGPT) 40 U/L      AST (SGOT) 30 U/L      Alkaline Phosphatase 139 U/L      Total Bilirubin 0.7 mg/dL      Globulin 4.1 gm/dL      A/G Ratio 1.0 g/dL      BUN/Creatinine Ratio 22.5     Anion Gap 23.0 mmol/L      eGFR 18.0 mL/min/1.73      Comment: National Kidney Foundation and American Society of Nephrology (ASN) Task Force recommended calculation based on the Chronic Kidney Disease Epidemiology Collaboration (CKD-EPI) equation refit without adjustment for race.       Narrative:      GFR Normal >60  Chronic Kidney Disease <60  Kidney Failure <15      Manual Differential [[id number]]  (Abnormal) Collected: 08/09/22 1130    Specimen: Blood Updated: 08/09/22 1225     Neutrophil % 89.0 %      Lymphocyte % 7.0 %      Monocyte % 2.0 %      Bands %  2.0 %      Neutrophils Absolute 28.51 10*3/mm3      Lymphocytes Absolute 2.19 10*3/mm3      Monocytes Absolute 0.63 10*3/mm3      Anisocytosis Slight/1+     Toxic Granulation Slight/1+     Vacuolated Neutrophils Slight/1+     Platelet Estimate Adequate     Large Platelets Slight/1+    COVID-19 and FLU A/B PCR - Swab, Nasopharynx [[id number]]  (Abnormal) Collected: 08/09/22 1156    Specimen: Swab from Nasopharynx Updated: 08/09/22 1225     COVID19 Detected     Comment: Enhanced Precautions Requested          Influenza A PCR Not Detected     Influenza B PCR Not Detected    Narrative:      Fact sheet for providers: https://www.fda.gov/media/201449/download    Fact sheet for patients: https://www.fda.gov/media/713814/download    Test performed by PCR.  Influenza A and Influenza B negative results should be considered presumptive in samples that have a positive SARS-CoV-2 result.    Competitive inhibition studies showed that SARS-CoV-2 virus, when present at concentrations  above 3.6E+04 copies/mL, can inhibit the detection and amplification of influenza A and influenza B virus RNA if present at or below 1.8E+02 copies/mL or 4.9E+02 copies/mL, respectively, and may lead to false negative influenza virus results. If co-infection with influenza A or influenza B virus is suspected in samples with a positive SARS-CoV-2 result, the sample should be re-tested with another FDA cleared, approved, or authorized influenza test, if influenza virus detection would change clinical management.    Urinalysis With Culture If Indicated - Urine, Clean Catch [[id number]]  (Abnormal) Collected: 08/09/22 1142    Specimen: Urine, Clean Catch Updated: 08/09/22 1222     Color, UA Dark Yellow     Appearance, UA Turbid     pH, UA 8.0     Specific Gravity, UA 1.016     Glucose, UA Negative     Ketones, UA Negative     Bilirubin, UA Negative     Blood, UA Moderate (2+)     Protein, UA >=300 mg/dL (3+)     Leuk Esterase, UA Large (3+)     Nitrite, UA Negative     Urobilinogen, UA 0.2 E.U./dL    Narrative:      In absence of clinical symptoms, the presence of pyuria, bacteria, and/or nitrites on the urinalysis result does not correlate with infection.    Protime-INR [[id number]]  (Abnormal) Collected: 08/09/22 1129    Specimen: Blood Updated: 08/09/22 1150     Protime 16.0 Seconds      INR 1.30    Narrative:      Therapeutic range for most indications is 2.0-3.0 INR,  or 2.5-3.5 for mechanical heart valves.    CBC & Differential [[id number]]  (Abnormal) Collected: 08/09/22 1130    Specimen: Blood Updated: 08/09/22 1142    Narrative:      The following orders were created for panel order CBC & Differential.  Procedure                               Abnormality         Status                     ---------                               -----------         ------                     CBC Auto Differential[[id number]]        Abnormal            Final result               Scan Slide[[id number]]                                                                     Please view results for these tests on the individual orders.        Imaging Results (Most Recent)     Procedure Component Value Units Date/Time    CT Abdomen Pelvis Without Contrast [[id number]] Collected: 08/09/22 1523     Updated: 08/09/22 1541    Narrative:      EXAM:  CT ABDOMEN PELVIS WITHOUT IV CONTRAST    ORDERING PROVIDER:  VESTA BEEBE    CLINICAL HISTORY:  Sepsis, abscess    COMPARISON:      TECHNIQUE:   CT abdomen and pelvis performed without IV or oral contrast,  reformatted in the sagittal and coronal planes.     This examination was performed according to our departmental dose  optimization program which includes automated exposure control,  adjustment of the MA and kV according to patient size, and/or use  of iterative reconstruction technique.    FINDINGS:    BASILAR CHEST: Mild to moderate left pleural effusion. Mild  consolidation in the lung bases on the left side and to a lesser  extent on the right.    LIVER: No mass, enlargement or abnormal density.    BILIARY TRACT: Unremarkable gallbladder.    SPLEEN: No mass or enlargement.    PANCREAS: No mass or inflammatory process. Normal pancreatic duct    ADRENAL GLANDS: Unremarkable. No mass.    URINARY SYSTEM: Kidneys are normal in size. No obstructing stone,  or gross mass. Bilateral mild hydroureteronephrosis concerning  for chronic urinary bladder outlet obstruction. Bladder is mildly  distended without mass or stone.      GI TRACT: G-tube in place with good position. No mass, dilation,  or wall thickening.  No diverticula.  No hernia.  Appendix is  normal.      REPRODUCTIVE SYSTEM: Unremarkable    PERITONEAL SPACE:No free air, free fluid, mass or adenopathy.    RETROPERITONEAL SPACE:  No adenopathy, mass, aneurysm or  significant vascular abnormality.     BONES AND EXTRA-ABDOMINAL SOFT TISSUES: No aggressive osseous  lesion..  No inguinal adenopathy or hernia.      Impression:      Mild to moderate left  pleural effusion.   Mild consolidation in the lung bases on the left side and to a  lesser extent on the right.  Bilateral mild hydroureteronephrosis concerning for chronic  urinary bladder outlet obstruction.   The urinary bladder is distended.  Good position of the G-tube.  Appendix is normal.    Electronically signed by:  Carlos Alberto Ospina MD  8/9/2022 3:39 PM CDT  Workstation: [id number]    XR Chest Post CVA Port [[id number]] Collected: 08/09/22 1411     Updated: 08/09/22 1430    Narrative:      EXAMINATION:  XR CHEST POST CVA PORT    CLINICAL HISTORY:  72 years Male,hypo, A41.9 Sepsis, unspecified  organism R65.20 Severe sepsis without septic shock N17.9 Acute  kidney failure, unspecified N17.9 Acute kidney failure,  unspecified E87.5 Hyperkalemia N39.0 Urinary tract infection,  site not specified R31.9 Hematuria, unspecified I95.9  Hypotension, unspecified    COMPARISON:  Chest x-ray dated 8/9/2022    FINDINGS:  Right PICC with tip in the mid SVC, new since earlier  today. No pneumothorax. Small left pleural effusion with adjacent  atelectasis/consolidation. Normal heart size. Evidence of a prior  granulomas process.      Impression:      New right PICC with tip in the SVC. No pneumothorax.    Electronically signed by:  Santos Meredith MD  8/9/2022 2:27 PM CDT  Workstation: [id number]    US Guidance PICC NC [[id number]] Resulted: 08/09/22 1421     Updated: 08/09/22 1421    Narrative:      This procedure was auto-finalized with no dictation required.    IR PICC W Imaging Guidance [[id number]] Resulted: 08/09/22 1418     Updated: 08/09/22 1418    Narrative:      This procedure was auto-finalized with no dictation required.    XR Chest 1 View [[id number]] Collected: 08/09/22 1054     Updated: 08/09/22 1142    Narrative:      EXAMINATION:  XR CHEST 1 VW    CLINICAL HISTORY:  72 years Male,Stroke Protocol (Onset > 12 hrs)    COMPARISON:  None    FINDINGS:  Small left pleural effusion with adjacent likely  atelectasis or possibly  consolidation. No pneumothorax. Normal  heart size and pulmonary vascularity. Evidence of a prior  granulomatous process.      Impression:      Small left pleural effusion with adjacent  atelectasis or possibly consolidation.    Electronically signed by:  Santos Meredith MD  8/9/2022 11:40 AM CDT  Workstation: [id number]    CT Head Without Contrast Stroke Protocol [[id number]] Collected: 08/09/22 1053     Updated: 08/09/22 1122    Narrative:      PROCEDURE: CT HEAD WO CONTRAST STROKE    INDICATION:  CVA one month ago, follow-up    COMPARISON:  None    TECHNIQUE:  CT head, without iv contrast.       This exam was performed according to our departmental  dose-optimization program, which includes automated exposure  control, adjustment of the mA and/or kV according to patient size  and/or use of iterative reconstruction technique.  DLP is 892.5    FINDINGS:    The degree of cerebral and cerebellar atrophy is within normal  limits for age.    There is preservation of the gray-white matter differentiation.     Old infarcts in both basal ganglia are present, as well as a  larger areas of nonacute infarction/encephalomalacia in the right  frontoparietal region.    There are extensive age related degenerative cortical and deep  white matter changes.    There is no evidence of a hemorrhage or intracranial mass.    There is no midline shift.    The ventricles are normal in size and configuration.    The brain stem, globes, paranasal sinuses, and osseous structures  are within normal limits.        Impression:      Generalized cerebral and cerebellar atrophy. No acute  intracranial abnormality, but there are areas of nonacute  infarction/encephalomalacia in the bilateral basal ganglia, as  well as the right frontal and parietal lobes.  If pain or symptoms persist beyond reasonable expectations, an  MRI examination is suggested as is deemed clinically appropriate.    Electronically signed by:  Eugenie Vera MD  8/9/2022 11:20 AM  "CDT  Workstation: [id number]          Chief Complaint on Day of Discharge: No new complaints    Hospital Course:  The patient is a 72 y.o. male with previous history of stroke who presented to [facility] with worsening confusion from the nursing home where he had been on hospice.  Patient had been on hospice care at the nursing facility as he had been there for rehab after his stroke but felt like improvement.  Patient was admitted for sepsis secondary to urinary tract infection and acute kidney injury.  Patient was started on antibiotic therapy and nephrology and urology were consulted.  Patient was found to have Proteus bacteremia and was treated for this.  Repeat blood cultures were obtained and no growth to date at the time of discharge.  Patient had gradual improvement during his stay as well as improvement in his mentation.  Patient was noted to be overall improved and completed his antibiotic course in the hospital.  Family elected for the patient to be discharged back to the nursing facility to resume hospice care again.      Condition on Discharge:  Stable    Physical Exam on Discharge:  /53 (BP Location: Right arm, Patient Position: Sitting)   Pulse 107   Temp 98.1 °F (36.7 °C) (Temporal)   Resp 18   Ht 188 cm (74.02\")   Wt 67.2 kg (148 lb 1.6 oz)   SpO2 95%   BMI 19.01 kg/m²   Physical Exam  Constitutional:       General: He is not in acute distress.     Appearance: He is not toxic-appearing.   HENT:      Head: Normocephalic and atraumatic.      Right Ear: External ear normal.      Left Ear: External ear normal.      Nose: Nose normal.      Mouth/Throat:      Mouth: Mucous membranes are moist.      Pharynx: Oropharynx is clear.   Eyes:      Conjunctiva/sclera: Conjunctivae normal.   Cardiovascular:      Rate and Rhythm: Normal rate and regular rhythm.      Pulses: Normal pulses.      Heart sounds: Normal heart sounds.   Pulmonary:      Effort: Pulmonary effort is normal. No " respiratory distress.      Breath sounds: Normal breath sounds.   Abdominal:      General: Bowel sounds are normal.      Palpations: Abdomen is soft.      Tenderness: There is no abdominal tenderness.   Musculoskeletal:         General: No deformity.   Skin:     General: Skin is warm and dry.      Capillary Refill: Capillary refill takes less than 2 seconds.   Neurological:      Mental Status: Mental status is at baseline.   Psychiatric:         Behavior: Behavior normal.       Discharge Disposition:  Skilled Nursing Facility (DC - External)    Discharge Medications:     Discharge Medications      New Medications      Instructions Start Date   midodrine 5 MG tablet  Commonly known as: PROAMATINE   5 mg, Oral, 3 Times Daily Before Meals         Continue These Medications      Instructions Start Date   albuterol (2.5 MG/3ML) 0.083% nebulizer solution  Commonly known as: PROVENTIL   2.5 mg, Nebulization, Every 6 Hours PRN      baclofen 10 MG tablet  Commonly known as: LIORESAL   1 tablet, Oral, Every 8 Hours PRN      docusate sodium 100 MG capsule  Commonly known as: COLACE   1 capsule, Oral, 2 Times Daily      FLUoxetine 20 MG capsule  Commonly known as: PROzac   1 capsule, Oral, Daily      HYDROcodone-acetaminophen 5-325 MG per tablet  Commonly known as: NORCO   1 tablet, Oral, Every 6 Hours PRN      lactulose 20 GM/30ML solution solution   30 mL, Oral, Daily PRN      magnesium hydroxide 400 MG/5ML suspension  Commonly known as: MILK OF MAGNESIA   30 mL, Oral, Daily PRN      Melatonin 10 MG tablet   1 tablet, Oral, Nightly      metoclopramide 10 MG tablet  Commonly known as: REGLAN   1 tablet, Oral, 4 Times Daily      Omeprazole 20 MG tablet delayed-release   1 capsule, Oral, Daily      polyethylene glycol 17 GM/SCOOP powder  Commonly known as: MIRALAX   17 g, Oral, Daily         Stop These Medications    gabapentin 100 MG capsule  Commonly known as: NEURONTIN            Discharge Diet:   Diet Instructions     Diet:  Tube Feeding; Bolus; Isosource 1.5 (Jevity 1.5); Tube Feeding Type: Cyclic / Intermittent; Feeding Start Time: 4:00 PM; Feeding End Time: 8:00 AM; Cyclic Feeding Start Rate (mL/hr): 25; To Goal Rate of (mL/hr): 60; Supplemental Bolus Feeding      Discharge Diet: Tube Feeding    Feeding Type: Bolus    Formula, Amount & Frequency: Isosource 1.5 (Jevity 1.5); Tube Feeding Type: Cyclic / Intermittent; Feeding Start Time: 4:00 PM; Feeding End Time: 8:00 AM; Cyclic Feeding Start Rate (mL/hr): 25; To Goal Rate of (mL/hr): 60; Supplemental Bolus Feeding          Activity at Discharge:   Activity Instructions     Gradually Increase Activity Until at Pre-Hospitalization Level            Discharge Care Plan/Instructions: Ongoing care per nursing facility    Follow-up Appointments:   No future appointments.    Test Results Pending at Discharge:   Pending Labs     Order Current Status    Blood Culture - Blood, Arm, Left Preliminary result    Blood Culture - Blood, Arm, Right Preliminary result            Bahman Hinton MD    Time: 33 minutes

## 2022-08-16 NOTE — DISCHARGE PLACEMENT REQUEST
"Discharge from [facility] Room 309  Returning to [facility] Room 306B  Covid negative    Deanne Adair RN,CCM  Level 5        Marcela Paige (72 y.o. Male)             Date of Birth   1950    Social Security Number       Address   [facility] [address] [place] [address]    Home Phone   [phone]    MRN   [id number]       Jehovah's witness   None    Marital Status                               Admission Date   8/9/22    Admission Type   Emergency    Admitting Provider   Yadi Knutson MD    Attending Provider   Yadi Knutson MD    Department, Room/Bed   [address], 309/1       Discharge Date       Discharge Disposition   Skilled Nursing Facility (DC - External)    Discharge Destination                               Attending Provider: Yadi Knutson MD    Allergies: No Known Allergies    Isolation: None   Infection: COVID (History) (08/09/22)   Code Status: No CPR   Advance Care Planning Activity    Ht: 188 cm (74.02\")   Wt: 67.2 kg (148 lb 1.6 oz)    Admission Cmt: None   Principal Problem: None                Active Insurance as of 8/9/2022     Primary Coverage     Payor Plan Insurance Group Employer/Plan Group    HUMANA MEDICARE REPLACEMENT HU[employer]NA MEDICARE REPLACEMENT [id number]     Payor Plan Address Payor Plan Phone Number Payor Plan Fax Number Effective Dates    [address] [address] [phone]  1/1/2019 - None Entered    [facility] [id number]       Subscriber Name Subscriber Birth Date Member ID       MARCELA PAIGE 1950 [id number]                 Emergency Contacts      (Rel.) Home Phone Work Phone Mobile Phone    SUSAN PAIGE (Daughter) -- -- [phone]    MAKENZIE PAIGE (Son) -- -- [phone]              "

## 2022-08-17 LAB
BACTERIA SPEC AEROBE CULT: NORMAL
BACTERIA SPEC AEROBE CULT: NORMAL

## 2022-08-17 NOTE — PAYOR COMM NOTE
"Hien Flores  [facility]  Case Management Extender  [phone] phone  [phone] fax      Auth# [id number]    Marcela Ivan (72 y.o. Male)             Date of Birth   1950    Social Security Number       [facility] [place] [address]    Home Phone   [phone]    MRN   [id number]       Catholic   None    Marital Status                               Admission Date   8/9/22    Admission Type   Emergency    Admitting Provider   Yadi Knutson MD    Attending Provider       Department, Room/Bed   [address], 309/1       Discharge Date   8/16/2022    Discharge Disposition   Skilled Nursing Facility (DC - External)    Discharge Destination                               Attending Provider: (none)   Allergies: No Known Allergies    Isolation: None   Infection: COVID (History) (08/09/22)   Code Status: Prior   Advance Care Planning Activity    Ht: 188 cm (74.02\")   Wt: 67.2 kg (148 lb 1.6 oz)    Admission Cmt: None   Principal Problem: None                Active Insurance as of 8/9/2022     Primary Coverage     Payor Plan Insurance Group Employer/Plan Group    HUMANA MEDICARE REPLACEMENT HUMANA MEDICARE REPLACEMENT [id number]     Payor Plan Address Payor Plan Phone Number Payor Plan Fax Number Effective Dates    [address] [address] [phone]  1/1/2019 - None Entered    [facility] [id number]       Subscriber Name Subscriber Birth Date Member ID       MARCELA IVAN 1950 [id number]                 Emergency Contacts      (Rel.) Home Phone Work Phone Mobile Phone    SUSAN IVAN (Daughter) -- -- [phone]    MAKENZIE IVAN (Son) -- -- [phone]               Discharge Summary      Bahman Hinton MD at 08/16/22 [address] [place] [address] Medicine Services  DISCHARGE SUMMARY       Date of Admission: 8/9/2022  Date of Discharge:  " 8/16/2022  Primary Care Physician: Estrada Fuentes MD    Presenting Problem/History of Present Illness:  Hyperkalemia [E87.5]  Hypotension, unspecified hypotension type [I95.9]  Urinary tract infection with hematuria, site unspecified [N39.0, R31.9]  Acute renal failure, unspecified acute renal failure type (HCC) [N17.9]  Sepsis with acute renal failure without septic shock, due to unspecified organism, unspecified acute renal failure type (HCC) [A41.9, R65.20, N17.9]     Final Discharge Diagnoses:  Active Hospital Problems    Diagnosis    • NELLIE (acute kidney injury) (HCC)    • Sepsis with acute renal failure without septic shock, due to unspecified organism, unspecified acute renal failure type (HCC)    • Urinary tract infection associated with indwelling urethral catheter (HCC)        Consults:   Consults     Date and Time Order Name Status Description    8/10/2022 11:18 AM Inpatient Urology Consult Completed     8/9/2022  2:10 PM Inpatient Nephrology Consult Completed     8/9/2022  1:53 PM Hospitalist (on-call MD unless specified)            Procedures Performed:                 Pertinent Test Results:   Lab Results (most recent)     Procedure Component Value Units Date/Time    COVID PRE-OP / PRE-PROCEDURE SCREENING ORDER (NO ISOLATION) - Swab, Nasopharynx [[id number]]  (Normal) Collected: 08/16/22 1000    Specimen: Swab from Nasopharynx Updated: 08/16/22 1340    Narrative:      The following orders were created for panel order COVID PRE-OP / PRE-PROCEDURE SCREENING ORDER (NO ISOLATION) - Swab, Nasopharynx.  Procedure                               Abnormality         Status                     ---------                               -----------         ------                     COVID-DULCE [facility] IN-HOUS...[[id number]]  Normal              Final result                 Please view results for these tests on the individual orders.    COVID-DULCE [facility] IN-HOUSE, NP SWAB IN TRANSPORT MEDIA 8-10 HR TAT - Swab,  Nasopharynx [[id number]]  (Normal) Collected: 08/16/22 1000    Specimen: Swab from Nasopharynx Updated: 08/16/22 1340     COVID19 Not Detected    Narrative:      Testing performed by Real Time RT-PCR  This test has not been approved by the [facility] but is authorized under the Emergency Use Act (EUA)    Fact sheet for providers: https://www.fda.gov/media/468664/download    Fact sheet for patients: https://www.fda.gov/media/621724/download        Blood Culture - Blood, Arm, Left [[id number]]  (Normal) Collected: 08/12/22 1137    Specimen: Blood from Arm, Left Updated: 08/16/22 1147     Blood Culture No growth at 4 days    Blood Culture - Blood, Arm, Right [[id number]]  (Normal) Collected: 08/12/22 1137    Specimen: Blood from Arm, Right Updated: 08/16/22 1147     Blood Culture No growth at 4 days    Manual Differential [[id number]]  (Abnormal) Collected: 08/16/22 0518    Specimen: Blood Updated: 08/16/22 0732     Neutrophil % 67.0 %      Lymphocyte % 17.0 %      Monocyte % 4.0 %      Eosinophil % 4.0 %      Bands %  8.0 %      Neutrophils Absolute 9.17 10*3/mm3      Lymphocytes Absolute 2.08 10*3/mm3      Monocytes Absolute 0.49 10*3/mm3      Eosinophils Absolute 0.49 10*3/mm3      Anisocytosis Slight/1+     WBC Morphology Normal     Platelet Estimate Adequate    Basic Metabolic Panel [[id number]]  (Abnormal) Collected: 08/16/22 0518    Specimen: Blood Updated: 08/16/22 0649     Glucose 136 mg/dL      BUN 24 mg/dL      Creatinine 0.52 mg/dL      Sodium 135 mmol/L      Potassium 4.4 mmol/L      Chloride 101 mmol/L      CO2 23.0 mmol/L      Calcium 8.2 mg/dL      BUN/Creatinine Ratio 46.2     Anion Gap 11.0 mmol/L      eGFR 107.1 mL/min/1.73      Comment: National Kidney Foundation and American Society of Nephrology (ASN) Task Force recommended calculation based on the Chronic Kidney Disease Epidemiology Collaboration (CKD-EPI) equation refit without adjustment for race.       Narrative:      GFR Normal >60  Chronic Kidney  Disease <60  Kidney Failure <15      CBC Auto Differential [[id number]]  (Abnormal) Collected: 08/16/22 0518    Specimen: Blood Updated: 08/16/22 0638     WBC 12.22 10*3/mm3      RBC 3.76 10*6/mm3      Hemoglobin 9.3 g/dL      Hematocrit 29.2 %      MCV 77.7 fL      MCH 24.7 pg      MCHC 31.8 g/dL      RDW 16.2 %      RDW-SD 44.7 fl      MPV 10.6 fL      Platelets 235 10*3/mm3     Basic Metabolic Panel [[id number]]  (Abnormal) Collected: 08/15/22 0553    Specimen: Blood Updated: 08/15/22 0631     Glucose 165 mg/dL      BUN 23 mg/dL      Creatinine 0.58 mg/dL      Sodium 136 mmol/L      Potassium 4.0 mmol/L      Chloride 102 mmol/L      CO2 24.0 mmol/L      Calcium 8.1 mg/dL      BUN/Creatinine Ratio 39.7     Anion Gap 10.0 mmol/L      eGFR 103.6 mL/min/1.73      Comment: National Kidney Foundation and American Society of Nephrology (ASN) Task Force recommended calculation based on the Chronic Kidney Disease Epidemiology Collaboration (CKD-EPI) equation refit without adjustment for race.       Narrative:      GFR Normal >60  Chronic Kidney Disease <60  Kidney Failure <15      CBC & Differential [[id number]]  (Abnormal) Collected: 08/15/22 0553    Specimen: Blood Updated: 08/15/22 0612    Narrative:      The following orders were created for panel order CBC & Differential.  Procedure                               Abnormality         Status                     ---------                               -----------         ------                     CBC Auto Differential[[id number]]        Abnormal            Final result               Scan Slide[[id number]]                                                                    Please view results for these tests on the individual orders.    CBC Auto Differential [[id number]]  (Abnormal) Collected: 08/15/22 0553    Specimen: Blood Updated: 08/15/22 0612     WBC 13.54 10*3/mm3      RBC 3.87 10*6/mm3      Hemoglobin 9.6 g/dL      Hematocrit 30.0 %      MCV 77.5 fL      MCH 24.8 pg       MCHC 32.0 g/dL      RDW 16.1 %      RDW-SD 44.6 fl      MPV 10.6 fL      Platelets 232 10*3/mm3      Neutrophil % 77.8 %      Lymphocyte % 8.6 %      Monocyte % 6.3 %      Eosinophil % 2.1 %      Basophil % 0.4 %      Immature Grans % 4.8 %      Neutrophils, Absolute 10.54 10*3/mm3      Lymphocytes, Absolute 1.17 10*3/mm3      Monocytes, Absolute 0.85 10*3/mm3      Eosinophils, Absolute 0.28 10*3/mm3      Basophils, Absolute 0.05 10*3/mm3      Immature Grans, Absolute 0.65 10*3/mm3      nRBC 0.0 /100 WBC     Urine Culture - Urine, Urine, Clean Catch [[id number]]  (Abnormal)  (Susceptibility) Collected: 08/09/22 1142    Specimen: Urine, Clean Catch Updated: 08/11/22 1058     Urine Culture >100,000 CFU/mL Proteus mirabilis    Narrative:      Colonization of the urinary tract without infection is common. Treatment is discouraged unless the patient is symptomatic, pregnant, or undergoing an invasive urologic procedure.    Susceptibility      Proteus mirabilis      CHRISTIANNE      Ampicillin Susceptible      Ampicillin + Sulbactam Susceptible      Cefazolin Susceptible      Cefepime Susceptible      Ceftazidime Susceptible      Ceftriaxone Susceptible      Gentamicin Susceptible      Levofloxacin Resistant     Nitrofurantoin Resistant     Piperacillin + Tazobactam Susceptible      Trimethoprim + Sulfamethoxazole Resistant                   Linear View                   Protein / Creatinine Ratio, Urine - Urine, Clean Catch [[id number]]  (Abnormal) Collected: 08/09/22 1142    Specimen: Urine, Clean Catch Updated: 08/10/22 0916     Protein/Creatinine Ratio, Urine 15,520.9 mg/G Crea      Creatinine, Urine 38.2 mg/dL      Total Protein, Urine 592.9 mg/dL     Blood Culture ID, PCR - Blood, Hand, Right [[id number]]  (Abnormal) Collected: 08/09/22 1222    Specimen: Blood from Hand, Right Updated: 08/10/22 0525     BCID, PCR Enterobacterales spp. Identification by BCID2 PCR.     BCID, PCR 2 Proteus spp. Identification by BCID2 PCR.     STAT Lactic Acid, Reflex [[id number]]  (Normal) Collected: 08/10/22 0242    Specimen: Blood Updated: 08/10/22 0257     Lactate 1.6 mmol/L     STAT Lactic Acid, Reflex [[id number]]  (Abnormal) Collected: 08/09/22 2049    Specimen: Blood Updated: 08/09/22 2144     Lactate 2.5 mmol/L     Sodium, Urine, Random - Urine, Clean Catch [[id number]] Collected: 08/09/22 1142    Specimen: Urine, Clean Catch Updated: 08/09/22 1614     Sodium, Urine 133 mmol/L     Narrative:      Reference intervals for random urine have not been established.  Clinical usage is dependent upon physician's interpretation in combination with other laboratory tests.       Extra Tubes [[id number]] Collected: 08/09/22 1130    Specimen: Blood, Venous Line Updated: 08/09/22 1533    Narrative:      The following orders were created for panel order Extra Tubes.  Procedure                               Abnormality         Status                     ---------                               -----------         ------                     Felix Top[[id number]]                                         Final result                 Please view results for these tests on the individual orders.    Felix Top [[id number]] Collected: 08/09/22 1130    Specimen: Blood Updated: 08/09/22 1533     Extra Tube Hold for add-ons.     Comment: Auto resulted.       Procalcitonin [[id number]]  (Abnormal) Collected: 08/09/22 1257    Specimen: Blood Updated: 08/09/22 1529     Procalcitonin 54.88 ng/mL     Narrative:      As a Marker for Sepsis (Non-Neonates):    1. <0.5 ng/mL represents a low risk of severe sepsis and/or septic shock.  2. >2 ng/mL represents a high risk of severe sepsis and/or septic shock.    As a Marker for Lower Respiratory Tract Infections that require antibiotic therapy:    PCT on Admission    Antibiotic Therapy       6-12 Hrs later    >0.5                Strongly Recommended  >0.25 - <0.5        Recommended   0.1 - 0.25          Discouraged               "Remeasure/reassess PCT  <0.1                Strongly Discouraged     Remeasure/reassess PCT    As 28 day mortality risk marker: \"Change in Procalcitonin Result\" (>80% or <=80%) if Day 0 (or Day 1) and Day 4 values are available. Refer to http://www.[facility]-pct-calculator.com    Change in PCT <=80%  A decrease of PCT levels below or equal to 80% defines a positive change in PCT test result representing a higher risk for 28-day all-cause mortality of patients diagnosed with severe sepsis for septic shock.    Change in PCT >80%  A decrease of PCT levels of more than 80% defines a negative change in PCT result representing a lower risk for 28-day all-cause mortality of patients diagnosed with severe sepsis or septic shock.       Extra Tubes [[id number]] Collected: 08/09/22 1257    Specimen: Blood, Venous Line Updated: 08/09/22 1403    Narrative:      The following orders were created for panel order Extra Tubes.  Procedure                               Abnormality         Status                     ---------                               -----------         ------                     Lavender Top[[id number]]                                     Final result               Gold Top - SST[[id number]]                                   Final result                 Please view results for these tests on the individual orders.    Lavender Top [[id number]] Collected: 08/09/22 1257    Specimen: Blood Updated: 08/09/22 1403     Extra Tube hold for add-on     Comment: Auto resulted       Gold Top - SST [[id number]] Collected: 08/09/22 1257    Specimen: Blood Updated: 08/09/22 1403     Extra Tube Hold for add-ons.     Comment: Auto resulted.       Troponin [[id number]]  (Abnormal) Collected: 08/09/22 1257    Specimen: Blood Updated: 08/09/22 1337     Troponin T 0.120 ng/mL     Narrative:      Troponin T Reference Range:  <= 0.03 ng/mL-   Negative for AMI  >0.03 ng/mL-     Abnormal for myocardial necrosis.  Clinicians would have to utilize " clinical acumen, EKG, Troponin and serial changes to determine if it is an Acute Myocardial Infarction or myocardial injury due to an underlying chronic condition.       Results may be falsely decreased if patient taking Biotin.      Potassium [[id number]]  (Abnormal) Collected: 08/09/22 1257    Specimen: Blood from Arm, Left Updated: 08/09/22 1329     Potassium 5.9 mmol/L      Comment: Slight hemolysis detected by analyzer. Results may be affected.       Troponin [[id number]]  (Abnormal) Collected: 08/09/22 1130    Specimen: Blood Updated: 08/09/22 1311     Troponin T 0.189 ng/mL     Narrative:      Troponin T Reference Range:  <= 0.03 ng/mL-   Negative for AMI  >0.03 ng/mL-     Abnormal for myocardial necrosis.  Clinicians would have to utilize clinical acumen, EKG, Troponin and serial changes to determine if it is an Acute Myocardial Infarction or myocardial injury due to an underlying chronic condition.       Results may be falsely decreased if patient taking Biotin.      CK [[id number]]  (Normal) Collected: 08/09/22 1130    Specimen: Blood Updated: 08/09/22 1310     Creatine Kinase 47 U/L     Magnesium [[id number]]  (Abnormal) Collected: 08/09/22 1130    Specimen: Blood Updated: 08/09/22 1310     Magnesium 2.7 mg/dL     BNP [[id number]]  (Abnormal) Collected: 08/09/22 1130    Specimen: Blood Updated: 08/09/22 1309     proBNP 29,409.0 pg/mL     Narrative:      Among patients with dyspnea, NT-proBNP is highly sensitive for the detection of acute congestive heart failure. In addition NT-proBNP of <300 pg/ml effectively rules out acute congestive heart failure with 99% negative predictive value.    Results may be falsely decreased if patient taking Biotin.      Lactic Acid, Plasma [[id number]]  (Abnormal) Collected: 08/09/22 1130    Specimen: Blood Updated: 08/09/22 1236     Lactate 5.3 mmol/L     Urinalysis, Microscopic Only - Urine, Clean Catch [[id number]]  (Abnormal) Collected: 08/09/22 1142    Specimen: Urine, Clean  Catch Updated: 08/09/22 1235     RBC, UA 3-5 /HPF      WBC, UA Too Numerous to Count /HPF      Bacteria, UA 4+ /HPF      Squamous Epithelial Cells, UA None Seen /HPF      Hyaline Casts, UA None Seen /LPF      Methodology Manual Light Microscopy    Sarepta Draw [[id number]] Collected: 08/09/22 1129    Specimen: Blood Updated: 08/09/22 1233    Narrative:      The following orders were created for panel order Sarepta Draw.  Procedure                               Abnormality         Status                     ---------                               -----------         ------                     Green Top (Gel)[[id number]]                                  Final result               Lavender Top[[id number]]                                     Final result               Gold Top - SST[[id number]]                                   Final result               Light Blue Top[[id number]]                                   Final result                 Please view results for these tests on the individual orders.    Green Top (Gel) [[id number]] Collected: 08/09/22 1130    Specimen: Blood Updated: 08/09/22 1233     Extra Tube Hold for add-ons.     Comment: Auto resulted.       Lavender Top [[id number]] Collected: 08/09/22 1130    Specimen: Blood Updated: 08/09/22 1233     Extra Tube hold for add-on     Comment: Auto resulted       Gold Top - SST [[id number]] Collected: 08/09/22 1129    Specimen: Blood Updated: 08/09/22 1233     Extra Tube Hold for add-ons.     Comment: Auto resulted.       Light Blue Top [[id number]] Collected: 08/09/22 1129    Specimen: Blood Updated: 08/09/22 1233     Extra Tube Hold for add-ons.     Comment: Auto resulted       Comprehensive Metabolic Panel [[id number]]  (Abnormal) Collected: 08/09/22 1130    Specimen: Blood Updated: 08/09/22 1228     Glucose 210 mg/dL      BUN 78 mg/dL      Creatinine 3.46 mg/dL      Sodium 138 mmol/L      Potassium 6.4 mmol/L      Chloride 96 mmol/L      CO2 19.0 mmol/L      Calcium  10.0 mg/dL      Total Protein 8.2 g/dL      Albumin 4.10 g/dL      ALT (SGPT) 40 U/L      AST (SGOT) 30 U/L      Alkaline Phosphatase 139 U/L      Total Bilirubin 0.7 mg/dL      Globulin 4.1 gm/dL      A/G Ratio 1.0 g/dL      BUN/Creatinine Ratio 22.5     Anion Gap 23.0 mmol/L      eGFR 18.0 mL/min/1.73      Comment: National Kidney Foundation and American Society of Nephrology (ASN) Task Force recommended calculation based on the Chronic Kidney Disease Epidemiology Collaboration (CKD-EPI) equation refit without adjustment for race.       Narrative:      GFR Normal >60  Chronic Kidney Disease <60  Kidney Failure <15      Manual Differential [[id number]]  (Abnormal) Collected: 08/09/22 1130    Specimen: Blood Updated: 08/09/22 1225     Neutrophil % 89.0 %      Lymphocyte % 7.0 %      Monocyte % 2.0 %      Bands %  2.0 %      Neutrophils Absolute 28.51 10*3/mm3      Lymphocytes Absolute 2.19 10*3/mm3      Monocytes Absolute 0.63 10*3/mm3      Anisocytosis Slight/1+     Toxic Granulation Slight/1+     Vacuolated Neutrophils Slight/1+     Platelet Estimate Adequate     Large Platelets Slight/1+    COVID-19 and FLU A/B PCR - Swab, Nasopharynx [[id number]]  (Abnormal) Collected: 08/09/22 1156    Specimen: Swab from Nasopharynx Updated: 08/09/22 1225     COVID19 Detected     Comment: Enhanced Precautions Requested          Influenza A PCR Not Detected     Influenza B PCR Not Detected    Narrative:      Fact sheet for providers: https://www.fda.gov/media/578382/download    Fact sheet for patients: https://www.fda.gov/media/211419/download    Test performed by PCR.  Influenza A and Influenza B negative results should be considered presumptive in samples that have a positive SARS-CoV-2 result.    Competitive inhibition studies showed that SARS-CoV-2 virus, when present at concentrations above 3.6E+04 copies/mL, can inhibit the detection and amplification of influenza A and influenza B virus RNA if present at or below 1.8E+02  copies/mL or 4.9E+02 copies/mL, respectively, and may lead to false negative influenza virus results. If co-infection with influenza A or influenza B virus is suspected in samples with a positive SARS-CoV-2 result, the sample should be re-tested with another FDA cleared, approved, or authorized influenza test, if influenza virus detection would change clinical management.    Urinalysis With Culture If Indicated - Urine, Clean Catch [[id number]]  (Abnormal) Collected: 08/09/22 1142    Specimen: Urine, Clean Catch Updated: 08/09/22 1222     Color, UA Dark Yellow     Appearance, UA Turbid     pH, UA 8.0     Specific Gravity, UA 1.016     Glucose, UA Negative     Ketones, UA Negative     Bilirubin, UA Negative     Blood, UA Moderate (2+)     Protein, UA >=300 mg/dL (3+)     Leuk Esterase, UA Large (3+)     Nitrite, UA Negative     Urobilinogen, UA 0.2 E.U./dL    Narrative:      In absence of clinical symptoms, the presence of pyuria, bacteria, and/or nitrites on the urinalysis result does not correlate with infection.    Protime-INR [[id number]]  (Abnormal) Collected: 08/09/22 1129    Specimen: Blood Updated: 08/09/22 1150     Protime 16.0 Seconds      INR 1.30    Narrative:      Therapeutic range for most indications is 2.0-3.0 INR,  or 2.5-3.5 for mechanical heart valves.    CBC & Differential [[id number]]  (Abnormal) Collected: 08/09/22 1130    Specimen: Blood Updated: 08/09/22 1142    Narrative:      The following orders were created for panel order CBC & Differential.  Procedure                               Abnormality         Status                     ---------                               -----------         ------                     CBC Auto Differential[[id number]]        Abnormal            Final result               Scan Slide[[id number]]                                                                    Please view results for these tests on the individual orders.        Imaging Results (Most Recent)      Procedure Component Value Units Date/Time    CT Abdomen Pelvis Without Contrast [[id number]] Collected: 08/09/22 1523     Updated: 08/09/22 1541    Narrative:      EXAM:  CT ABDOMEN PELVIS WITHOUT IV CONTRAST    ORDERING PROVIDER:  VESTA BEEBE    CLINICAL HISTORY:  Sepsis, abscess    COMPARISON:      TECHNIQUE:   CT abdomen and pelvis performed without IV or oral contrast,  reformatted in the sagittal and coronal planes.     This examination was performed according to our departmental dose  optimization program which includes automated exposure control,  adjustment of the MA and kV according to patient size, and/or use  of iterative reconstruction technique.    FINDINGS:    BASILAR CHEST: Mild to moderate left pleural effusion. Mild  consolidation in the lung bases on the left side and to a lesser  extent on the right.    LIVER: No mass, enlargement or abnormal density.    BILIARY TRACT: Unremarkable gallbladder.    SPLEEN: No mass or enlargement.    PANCREAS: No mass or inflammatory process. Normal pancreatic duct    ADRENAL GLANDS: Unremarkable. No mass.    URINARY SYSTEM: Kidneys are normal in size. No obstructing stone,  or gross mass. Bilateral mild hydroureteronephrosis concerning  for chronic urinary bladder outlet obstruction. Bladder is mildly  distended without mass or stone.      GI TRACT: G-tube in place with good position. No mass, dilation,  or wall thickening.  No diverticula.  No hernia.  Appendix is  normal.      REPRODUCTIVE SYSTEM: Unremarkable    PERITONEAL SPACE:No free air, free fluid, mass or adenopathy.    RETROPERITONEAL SPACE:  No adenopathy, mass, aneurysm or  significant vascular abnormality.     BONES AND EXTRA-ABDOMINAL SOFT TISSUES: No aggressive osseous  lesion..  No inguinal adenopathy or hernia.      Impression:      Mild to moderate left pleural effusion.   Mild consolidation in the lung bases on the left side and to a  lesser extent on the right.  Bilateral mild  hydroureteronephrosis concerning for chronic  urinary bladder outlet obstruction.   The urinary bladder is distended.  Good position of the G-tube.  Appendix is normal.    Electronically signed by:  Carlos Alberto Ospina MD  8/9/2022 3:39 PM CDT  Workstation: [id number]    XR Chest Post CVA Port [[id number]] Collected: 08/09/22 1411     Updated: 08/09/22 1430    Narrative:      EXAMINATION:  XR CHEST POST CVA PORT    CLINICAL HISTORY:  72 years Male,hypo, A41.9 Sepsis, unspecified  organism R65.20 Severe sepsis without septic shock N17.9 Acute  kidney failure, unspecified N17.9 Acute kidney failure,  unspecified E87.5 Hyperkalemia N39.0 Urinary tract infection,  site not specified R31.9 Hematuria, unspecified I95.9  Hypotension, unspecified    COMPARISON:  Chest x-ray dated 8/9/2022    FINDINGS:  Right PICC with tip in the mid SVC, new since earlier  today. No pneumothorax. Small left pleural effusion with adjacent  atelectasis/consolidation. Normal heart size. Evidence of a prior  granulomas process.      Impression:      New right PICC with tip in the SVC. No pneumothorax.    Electronically signed by:  Santos Meredith MD  8/9/2022 2:27 PM CDT  Workstation: [id number]    US Guidance PICC NC [[id number]] Resulted: 08/09/22 1421     Updated: 08/09/22 1421    Narrative:      This procedure was auto-finalized with no dictation required.    IR PICC W Imaging Guidance [[id number]] Resulted: 08/09/22 1418     Updated: 08/09/22 1418    Narrative:      This procedure was auto-finalized with no dictation required.    XR Chest 1 View [[id number]] Collected: 08/09/22 1054     Updated: 08/09/22 1142    Narrative:      EXAMINATION:  XR CHEST 1 VW    CLINICAL HISTORY:  72 years Male,Stroke Protocol (Onset > 12 hrs)    COMPARISON:  None    FINDINGS:  Small left pleural effusion with adjacent likely  atelectasis or possibly consolidation. No pneumothorax. Normal  heart size and pulmonary vascularity. Evidence of a prior  granulomatous process.       Impression:      Small left pleural effusion with adjacent  atelectasis or possibly consolidation.    Electronically signed by:  Santos Meredith MD  8/9/2022 11:40 AM CDT  Workstation: [id number]    CT Head Without Contrast Stroke Protocol [[id number]] Collected: 08/09/22 1053     Updated: 08/09/22 1122    Narrative:      PROCEDURE: CT HEAD WO CONTRAST STROKE    INDICATION:  CVA one month ago, follow-up    COMPARISON:  None    TECHNIQUE:  CT head, without iv contrast.       This exam was performed according to our departmental  dose-optimization program, which includes automated exposure  control, adjustment of the mA and/or kV according to patient size  and/or use of iterative reconstruction technique.  DLP is 892.5    FINDINGS:    The degree of cerebral and cerebellar atrophy is within normal  limits for age.    There is preservation of the gray-white matter differentiation.     Old infarcts in both basal ganglia are present, as well as a  larger areas of nonacute infarction/encephalomalacia in the right  frontoparietal region.    There are extensive age related degenerative cortical and deep  white matter changes.    There is no evidence of a hemorrhage or intracranial mass.    There is no midline shift.    The ventricles are normal in size and configuration.    The brain stem, globes, paranasal sinuses, and osseous structures  are within normal limits.        Impression:      Generalized cerebral and cerebellar atrophy. No acute  intracranial abnormality, but there are areas of nonacute  infarction/encephalomalacia in the bilateral basal ganglia, as  well as the right frontal and parietal lobes.  If pain or symptoms persist beyond reasonable expectations, an  MRI examination is suggested as is deemed clinically appropriate.    Electronically signed by:  Eugenie Vera MD  8/9/2022 11:20 AM CDT  Workstation: [id number]          Chief Complaint on Day of Discharge: No new complaints    Hospital Course:  The patient is  "a 72 y.o. male with previous history of stroke who presented to [facility] with worsening confusion from the nursing home where he had been on hospice.  Patient had been on hospice care at the nursing facility as he had been there for rehab after his stroke but felt like improvement.  Patient was admitted for sepsis secondary to urinary tract infection and acute kidney injury.  Patient was started on antibiotic therapy and nephrology and urology were consulted.  Patient was found to have Proteus bacteremia and was treated for this.  Repeat blood cultures were obtained and no growth to date at the time of discharge.  Patient had gradual improvement during his stay as well as improvement in his mentation.  Patient was noted to be overall improved and completed his antibiotic course in the hospital.  Family elected for the patient to be discharged back to the nursing facility to resume hospice care again.      Condition on Discharge:  Stable    Physical Exam on Discharge:  /53 (BP Location: Right arm, Patient Position: Sitting)   Pulse 107   Temp 98.1 °F (36.7 °C) (Temporal)   Resp 18   Ht 188 cm (74.02\")   Wt 67.2 kg (148 lb 1.6 oz)   SpO2 95%   BMI 19.01 kg/m²   Physical Exam  Constitutional:       General: He is not in acute distress.     Appearance: He is not toxic-appearing.   HENT:      Head: Normocephalic and atraumatic.      Right Ear: External ear normal.      Left Ear: External ear normal.      Nose: Nose normal.      Mouth/Throat:      Mouth: Mucous membranes are moist.      Pharynx: Oropharynx is clear.   Eyes:      Conjunctiva/sclera: Conjunctivae normal.   Cardiovascular:      Rate and Rhythm: Normal rate and regular rhythm.      Pulses: Normal pulses.      Heart sounds: Normal heart sounds.   Pulmonary:      Effort: Pulmonary effort is normal. No respiratory distress.      Breath sounds: Normal breath sounds.   Abdominal:      General: Bowel sounds are normal.      " Palpations: Abdomen is soft.      Tenderness: There is no abdominal tenderness.   Musculoskeletal:         General: No deformity.   Skin:     General: Skin is warm and dry.      Capillary Refill: Capillary refill takes less than 2 seconds.   Neurological:      Mental Status: Mental status is at baseline.   Psychiatric:         Behavior: Behavior normal.       Discharge Disposition:  Skilled Nursing Facility (DC - External)    Discharge Medications:     Discharge Medications      New Medications      Instructions Start Date   midodrine 5 MG tablet  Commonly known as: PROAMATINE   5 mg, Oral, 3 Times Daily Before Meals         Continue These Medications      Instructions Start Date   albuterol (2.5 MG/3ML) 0.083% nebulizer solution  Commonly known as: PROVENTIL   2.5 mg, Nebulization, Every 6 Hours PRN      baclofen 10 MG tablet  Commonly known as: LIORESAL   1 tablet, Oral, Every 8 Hours PRN      docusate sodium 100 MG capsule  Commonly known as: COLACE   1 capsule, Oral, 2 Times Daily      FLUoxetine 20 MG capsule  Commonly known as: PROzac   1 capsule, Oral, Daily      HYDROcodone-acetaminophen 5-325 MG per tablet  Commonly known as: NORCO   1 tablet, Oral, Every 6 Hours PRN      lactulose 20 GM/30ML solution solution   30 mL, Oral, Daily PRN      magnesium hydroxide 400 MG/5ML suspension  Commonly known as: MILK OF MAGNESIA   30 mL, Oral, Daily PRN      Melatonin 10 MG tablet   1 tablet, Oral, Nightly      metoclopramide 10 MG tablet  Commonly known as: REGLAN   1 tablet, Oral, 4 Times Daily      Omeprazole 20 MG tablet delayed-release   1 capsule, Oral, Daily      polyethylene glycol 17 GM/SCOOP powder  Commonly known as: MIRALAX   17 g, Oral, Daily         Stop These Medications    gabapentin 100 MG capsule  Commonly known as: NEURONTIN            Discharge Diet:   Diet Instructions     Diet: Tube Feeding; Bolus; Isosource 1.5 (Jevity 1.5); Tube Feeding Type: Cyclic / Intermittent; Feeding Start Time: 4:00 PM;  Feeding End Time: 8:00 AM; Cyclic Feeding Start Rate (mL/hr): 25; To Goal Rate of (mL/hr): 60; Supplemental Bolus Feeding      Discharge Diet: Tube Feeding    Feeding Type: Bolus    Formula, Amount & Frequency: Isosource 1.5 (Jevity 1.5); Tube Feeding Type: Cyclic / Intermittent; Feeding Start Time: 4:00 PM; Feeding End Time: 8:00 AM; Cyclic Feeding Start Rate (mL/hr): 25; To Goal Rate of (mL/hr): 60; Supplemental Bolus Feeding          Activity at Discharge:   Activity Instructions     Gradually Increase Activity Until at Pre-Hospitalization Level            Discharge Care Plan/Instructions: Ongoing care per nursing facility    Follow-up Appointments:   No future appointments.    Test Results Pending at Discharge:   Pending Labs     Order Current Status    Blood Culture - Blood, Arm, Left Preliminary result    Blood Culture - Blood, Arm, Right Preliminary result            Bahman Hinton MD    Time: 33 minutes    Electronically signed by Bahman Hinton MD at 08/16/22 1593

## 2022-08-18 ENCOUNTER — HOME CARE VISIT (OUTPATIENT)
Dept: HOSPICE | Facility: HOSPICE | Age: 72
End: 2022-08-18

## 2022-08-18 ENCOUNTER — HOME CARE VISIT (OUTPATIENT)
Dept: HOME HEALTH SERVICES | Facility: CLINIC | Age: 72
End: 2022-08-18

## 2022-08-18 VITALS
DIASTOLIC BLOOD PRESSURE: 62 MMHG | TEMPERATURE: 97.7 F | SYSTOLIC BLOOD PRESSURE: 128 MMHG | HEART RATE: 119 BPM | OXYGEN SATURATION: 95 % | RESPIRATION RATE: 24 BRPM

## 2022-08-18 PROCEDURE — G0299 HHS/HOSPICE OF RN EA 15 MIN: HCPCS

## 2022-08-18 PROCEDURE — 6520001 HSPC ROUTINE CARE

## 2022-08-18 NOTE — HOSPICE
Patient admitted to [facility] with diagnosis of: Sepsis     Uncomfortable because of pain?  No Patient denies pain     Other life Sustaining measures (IV/IV antibiotic, tube feeding, dialysis, intubation): After discussion with daughter who is POA she wishes to proceede with each of the aforementioned procedures should nthey be indicated.    Hospitalization: After discussion with the daughter who is POA it is determined that she wishes for her father to return to the hospital if indicated.    CPR/DNR: After discussion with the daughter who is POA it is determined that the patient will remain a full code     If patient has history of dyspnea, The patient does not have a history of dyspnea     Discussed Spiritual/Existential concerns with: daughter who denies any spiritual or existensial concerns at this time     Admission booklet explained and instruction given on hospice availability and how to access nurse 24 hour/day    Covid-19 prevention education completed with daughter per protocol    One year ago the patient was living a normal life as he was six months ago. 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 One month ago the patient entered the hospital with sepsis. One week ago he remained in the nhospital but was recovering from sepsis. The daughter has chosen hospice at this time as the patient has other coexisting medical problems of congestive heart failure and chronic kidney disease. He is not expected to get any better and she just wwants to keep him comfortable.

## 2022-08-19 ENCOUNTER — HOME CARE VISIT (OUTPATIENT)
Dept: HOME HEALTH SERVICES | Facility: CLINIC | Age: 72
End: 2022-08-19

## 2022-08-19 PROCEDURE — 6520001 HSPC ROUTINE CARE

## 2022-08-20 PROCEDURE — 6520001 HSPC ROUTINE CARE

## 2022-08-21 PROCEDURE — 6520001 HSPC ROUTINE CARE

## 2022-08-22 ENCOUNTER — HOME CARE VISIT (OUTPATIENT)
Dept: HOME HEALTH SERVICES | Facility: CLINIC | Age: 72
End: 2022-08-22

## 2022-08-22 PROCEDURE — G0155 HHCP-SVS OF CSW,EA 15 MIN: HCPCS

## 2022-08-22 PROCEDURE — 6520001 HSPC ROUTINE CARE

## 2022-08-22 NOTE — HOSPICE
Signee had phone contact with Ruthann prior to patient's assessment for update and inform of visit. Ruthann said that she hadn't visited since patient's return back to the SNF but has had contact with staff who advised that he is not eating as much and noted no other concerns.     Signee met with patient who was in his room. He recognized Signee and denied any pain. Mr. Ivan answered questions by shaking head and refrained from further conversation. When asked if he was depressed a tear fell from his eyes. Signee explained that I was concerned for him and how he felt. Signee told patient that she remembered from last conversation that he was upset because of being dependent. Patient tears flowed even more. Signee encouraged patient to share what was causing him depression and educated on the importance of verbalizing to release stress. Patient said nothing. When Signee asked was he a Jainism he said yes. Signee told patient that I was his sister in [place] and could feel his pain. When asked if I could pray for him he said yes. Signee prayed for patient and before leaving asked if I could return to visit him. He said yes.     Signee will collaborate with [facility] team and report concerns. Signee will visit with patient 1 x per month to provide emotional support.    Advance Directives are on file per ROHINI Pascual.

## 2022-08-23 ENCOUNTER — HOME CARE VISIT (OUTPATIENT)
Dept: HOSPICE | Facility: HOSPICE | Age: 72
End: 2022-08-23

## 2022-08-23 VITALS
SYSTOLIC BLOOD PRESSURE: 127 MMHG | OXYGEN SATURATION: 97 % | HEART RATE: 113 BPM | BODY MASS INDEX: 18.63 KG/M2 | RESPIRATION RATE: 20 BRPM | WEIGHT: 145.2 LBS | TEMPERATURE: 97.3 F | DIASTOLIC BLOOD PRESSURE: 86 MMHG

## 2022-08-23 PROCEDURE — 6520001 HSPC ROUTINE CARE

## 2022-08-23 PROCEDURE — G0299 HHS/HOSPICE OF RN EA 15 MIN: HCPCS

## 2022-08-23 NOTE — HOSPICE
Upon arrival to facility patient sitting up in hospital bed alert, oriented to person and place. Full body assessment performed - see body systems charting and vital signs. Cleared with facility orders for PT and ST - to evaluate and make reccomendation only (ROHINI Cason). Patient and facility deny any needs at this time. Instructed to call hospice for any needs, concerns or changes. Facility staff aware of covid-19 prevention practices. Patient remains appropriate for hospice.

## 2022-08-24 PROCEDURE — 6520001 HSPC ROUTINE CARE

## 2022-08-24 NOTE — HOSPICE
The patient is non-communicative. There were no visible fall risks. I will visit 1xs, monthly.    The patient's daughter and I spoke about recent events in the declining helath of the patient.    I offered emotional encouragement and a listening and spiritual presence.

## 2022-08-25 PROCEDURE — 6520001 HSPC ROUTINE CARE

## 2022-08-26 PROCEDURE — 6520001 HSPC ROUTINE CARE

## 2022-08-27 PROCEDURE — 6520001 HSPC ROUTINE CARE

## 2022-08-28 PROCEDURE — 6520001 HSPC ROUTINE CARE

## 2022-08-29 ENCOUNTER — HOME CARE VISIT (OUTPATIENT)
Dept: HOME HEALTH SERVICES | Facility: CLINIC | Age: 72
End: 2022-08-29

## 2022-08-29 VITALS
HEART RATE: 66 BPM | DIASTOLIC BLOOD PRESSURE: 64 MMHG | SYSTOLIC BLOOD PRESSURE: 118 MMHG | OXYGEN SATURATION: 96 % | RESPIRATION RATE: 20 BRPM | TEMPERATURE: 98.1 F

## 2022-08-29 PROCEDURE — G0299 HHS/HOSPICE OF RN EA 15 MIN: HCPCS

## 2022-08-29 PROCEDURE — 6520001 HSPC ROUTINE CARE

## 2022-08-29 NOTE — HOSPICE
Upon arrival to facility patient is sitting up in the dining room eating lunch. Waited until patient was finished with lunch to assess. Full body assessment completed - see body systems charting and vital signs. Patient and staff deny any needs at this time. Instructed to call for any needs, changes or concerns. Staff aware of covid - 19 prevention practicies. Patient remains hospice appropriate. Patient has of yet not has PT or ST evaluations. They are scheduled for 8/30/22 the recommendations will be made to hospice.

## 2022-08-30 PROCEDURE — 6520001 HSPC ROUTINE CARE

## 2022-08-31 PROCEDURE — 6520001 HSPC ROUTINE CARE

## 2022-09-01 PROCEDURE — 65100000000 HSPC ROUTINE CARE

## 2022-09-01 PROCEDURE — 6520001 HSPC ROUTINE CARE

## 2022-09-02 PROCEDURE — 65100000000 HSPC ROUTINE CARE

## 2022-09-02 PROCEDURE — 6520001 HSPC ROUTINE CARE

## 2022-09-03 PROCEDURE — 65100000000 HSPC ROUTINE CARE

## 2022-09-03 PROCEDURE — 6520001 HSPC ROUTINE CARE

## 2022-09-04 PROCEDURE — 6520001 HSPC ROUTINE CARE

## 2022-09-04 PROCEDURE — 65100000000 HSPC ROUTINE CARE

## 2022-09-05 PROCEDURE — 65100000000 HSPC ROUTINE CARE

## 2022-09-05 PROCEDURE — 6520001 HSPC ROUTINE CARE

## 2022-09-06 PROCEDURE — 6520001 HSPC ROUTINE CARE

## 2022-09-06 PROCEDURE — 65100000000 HSPC ROUTINE CARE

## 2022-09-07 ENCOUNTER — HOME CARE VISIT (OUTPATIENT)
Dept: HOME HEALTH SERVICES | Facility: CLINIC | Age: 72
End: 2022-09-07

## 2022-09-07 VITALS
TEMPERATURE: 97.8 F | RESPIRATION RATE: 20 BRPM | HEART RATE: 71 BPM | OXYGEN SATURATION: 97 % | SYSTOLIC BLOOD PRESSURE: 112 MMHG | DIASTOLIC BLOOD PRESSURE: 64 MMHG

## 2022-09-07 PROCEDURE — 6520001 HSPC ROUTINE CARE

## 2022-09-07 PROCEDURE — 65100000000 HSPC ROUTINE CARE

## 2022-09-07 PROCEDURE — G0299 HHS/HOSPICE OF RN EA 15 MIN: HCPCS

## 2022-09-07 NOTE — HOSPICE
Upon arrival to facility, patient lying in bed alert. Patient is oriented to person and place. Patient is not tearful today. Remains with left-sided paralysis secondary to CVA. Full body assessment completed - see body systems charting and vital signs Remains dependent for all ADLs secondary to left sided paralysis secondary to CVA. Patient currently is able to feed self.  Patient and facility staff deny any needs at this time. Instructed to call for any needs, changes or concedrns. Staff aware of Covid-19 prevention education.

## 2022-09-08 PROCEDURE — 6520001 HSPC ROUTINE CARE

## 2022-09-08 PROCEDURE — 65100000000 HSPC ROUTINE CARE

## 2022-09-09 PROCEDURE — 6520001 HSPC ROUTINE CARE

## 2022-09-09 PROCEDURE — 65100000000 HSPC ROUTINE CARE

## 2022-09-10 PROCEDURE — 65100000000 HSPC ROUTINE CARE

## 2022-09-10 PROCEDURE — 6520001 HSPC ROUTINE CARE

## 2022-09-11 ENCOUNTER — HOME CARE VISIT (OUTPATIENT)
Dept: HOME HEALTH SERVICES | Facility: CLINIC | Age: 72
End: 2022-09-11

## 2022-09-11 VITALS
HEART RATE: 90 BPM | SYSTOLIC BLOOD PRESSURE: 120 MMHG | OXYGEN SATURATION: 95 % | TEMPERATURE: 97.6 F | DIASTOLIC BLOOD PRESSURE: 60 MMHG | RESPIRATION RATE: 18 BRPM

## 2022-09-11 PROCEDURE — G0299 HHS/HOSPICE OF RN EA 15 MIN: HCPCS

## 2022-09-11 PROCEDURE — 6520001 HSPC ROUTINE CARE

## 2022-09-11 PROCEDURE — 65100000000 HSPC ROUTINE CARE

## 2022-09-12 PROCEDURE — 65100000000 HSPC ROUTINE CARE

## 2022-09-12 PROCEDURE — 6520001 HSPC ROUTINE CARE

## 2022-09-12 NOTE — HOSPICE
receievd pt laying in bed with eyes open alert and verbally responsive.  He denies any acute pain or discomfot pt spends majority of time in bed.  He pegtube intact with clean dry dressing.  heel protectors intac 18fr t clifton cath patent and drainaige bag at bedside with clear yellow urine noted.  No obvious distress noted instructed staff to call hospice for any other acute changes or needs

## 2022-09-13 PROCEDURE — 6520001 HSPC ROUTINE CARE

## 2022-09-13 PROCEDURE — 65100000000 HSPC ROUTINE CARE

## 2022-09-14 PROCEDURE — 65100000000 HSPC ROUTINE CARE

## 2022-09-14 PROCEDURE — 6520001 HSPC ROUTINE CARE

## 2022-09-15 ENCOUNTER — OUTSIDE FACILITY SERVICE (OUTPATIENT)
Dept: FAMILY MEDICINE CLINIC | Facility: CLINIC | Age: 72
End: 2022-09-15

## 2022-09-15 PROCEDURE — 65100000000 HSPC ROUTINE CARE

## 2022-09-15 PROCEDURE — 99308 SBSQ NF CARE LOW MDM 20: CPT | Performed by: FAMILY MEDICINE

## 2022-09-15 PROCEDURE — 6520001 HSPC ROUTINE CARE

## 2022-09-16 PROCEDURE — 65100000000 HSPC ROUTINE CARE

## 2022-09-16 PROCEDURE — 6520001 HSPC ROUTINE CARE

## 2022-09-17 PROCEDURE — 65100000000 HSPC ROUTINE CARE

## 2022-09-17 PROCEDURE — 6520001 HSPC ROUTINE CARE

## 2022-09-18 PROCEDURE — 6520001 HSPC ROUTINE CARE

## 2022-09-18 PROCEDURE — 65100000000 HSPC ROUTINE CARE

## 2022-09-19 ENCOUNTER — HOME CARE VISIT (OUTPATIENT)
Dept: HOME HEALTH SERVICES | Facility: CLINIC | Age: 72
End: 2022-09-19

## 2022-09-19 VITALS
OXYGEN SATURATION: 97 % | TEMPERATURE: 98.1 F | HEART RATE: 76 BPM | SYSTOLIC BLOOD PRESSURE: 126 MMHG | DIASTOLIC BLOOD PRESSURE: 68 MMHG | RESPIRATION RATE: 20 BRPM

## 2022-09-19 PROCEDURE — 65100000000 HSPC ROUTINE CARE

## 2022-09-19 PROCEDURE — 6520001 HSPC ROUTINE CARE

## 2022-09-19 PROCEDURE — G0299 HHS/HOSPICE OF RN EA 15 MIN: HCPCS

## 2022-09-19 NOTE — HOSPICE
On arrival to facility found patient sitting up in the common room. Patients mood is improved, no tearfulness noted. Full body assessment completed - see body systems charting and vital signs. Patient and staff deny any needs at this time. Staff instructed to call hospice with any needs, changes or concerns. Staff aware of covid-19 prevention measures.

## 2022-09-20 PROCEDURE — 65100000000 HSPC ROUTINE CARE

## 2022-09-20 PROCEDURE — 6520001 HSPC ROUTINE CARE

## 2022-09-21 PROCEDURE — 6520001 HSPC ROUTINE CARE

## 2022-09-21 PROCEDURE — 65100000000 HSPC ROUTINE CARE

## 2022-09-22 PROCEDURE — 6520001 HSPC ROUTINE CARE

## 2022-09-22 PROCEDURE — 65100000000 HSPC ROUTINE CARE

## 2022-09-23 PROCEDURE — 6520001 HSPC ROUTINE CARE

## 2022-09-23 PROCEDURE — 65100000000 HSPC ROUTINE CARE

## 2022-09-24 PROCEDURE — 6520001 HSPC ROUTINE CARE

## 2022-09-24 PROCEDURE — 65100000000 HSPC ROUTINE CARE

## 2022-09-25 ENCOUNTER — HOME CARE VISIT (OUTPATIENT)
Dept: HOSPICE | Facility: HOSPICE | Age: 72
End: 2022-09-25

## 2022-09-25 VITALS
SYSTOLIC BLOOD PRESSURE: 120 MMHG | TEMPERATURE: 98.2 F | DIASTOLIC BLOOD PRESSURE: 66 MMHG | RESPIRATION RATE: 16 BRPM | HEART RATE: 80 BPM | OXYGEN SATURATION: 97 %

## 2022-09-25 PROCEDURE — 6520001 HSPC ROUTINE CARE

## 2022-09-25 PROCEDURE — G0299 HHS/HOSPICE OF RN EA 15 MIN: HCPCS

## 2022-09-25 PROCEDURE — 65100000000 HSPC ROUTINE CARE

## 2022-09-25 NOTE — HOSPICE
Upon arrival to facility patient resting in bed with eyes closed. Awakens easily for assessment. Per facililty staff he has already been up this am in the dining room eating breakfast. Facility staff states that he will be up again for lunch and for Sikh services this afternoon. Full body assessment performed - see body systems charting and vital signs. Patient and facility staff deny any needs at this time. Instructed to call for any needs, changes or concerns. Facility staff aware of covid-19 prevention practices.

## 2022-09-26 PROCEDURE — 6520001 HSPC ROUTINE CARE

## 2022-09-26 PROCEDURE — 65100000000 HSPC ROUTINE CARE

## 2022-09-27 ENCOUNTER — HOME CARE VISIT (OUTPATIENT)
Dept: HOME HEALTH SERVICES | Facility: CLINIC | Age: 72
End: 2022-09-27

## 2022-09-27 PROCEDURE — 65100000000 HSPC ROUTINE CARE

## 2022-09-27 PROCEDURE — 6520001 HSPC ROUTINE CARE

## 2022-09-27 NOTE — HOSPICE
The patient was asleep during my visit. There were no visible fall risks.    The patient's daughter and talked about the patient's rehab and his emotional adjustment to being in the rehab center.    I offered understanding, emotional encouragement, and a listening presence.

## 2022-09-28 PROCEDURE — 65100000000 HSPC ROUTINE CARE

## 2022-09-28 PROCEDURE — 6520001 HSPC ROUTINE CARE

## 2022-09-29 PROCEDURE — 6520001 HSPC ROUTINE CARE

## 2022-09-29 PROCEDURE — 65100000000 HSPC ROUTINE CARE

## 2022-09-30 PROCEDURE — 65100000000 HSPC ROUTINE CARE

## 2022-09-30 PROCEDURE — 6520001 HSPC ROUTINE CARE

## 2022-10-01 PROCEDURE — 6520001 HSPC ROUTINE CARE

## 2022-10-01 PROCEDURE — 65100000000 HSPC ROUTINE CARE

## 2022-10-02 PROCEDURE — 6520001 HSPC ROUTINE CARE

## 2022-10-02 PROCEDURE — 65100000000 HSPC ROUTINE CARE

## 2022-10-03 PROCEDURE — 6520001 HSPC ROUTINE CARE

## 2022-10-03 PROCEDURE — 65100000000 HSPC ROUTINE CARE

## 2022-10-04 PROCEDURE — 6520001 HSPC ROUTINE CARE

## 2022-10-04 PROCEDURE — 65100000000 HSPC ROUTINE CARE

## 2022-10-05 ENCOUNTER — HOME CARE VISIT (OUTPATIENT)
Dept: HOSPICE | Facility: HOSPICE | Age: 72
End: 2022-10-05

## 2022-10-05 VITALS
HEART RATE: 101 BPM | SYSTOLIC BLOOD PRESSURE: 115 MMHG | DIASTOLIC BLOOD PRESSURE: 71 MMHG | RESPIRATION RATE: 20 BRPM | TEMPERATURE: 97.7 F | OXYGEN SATURATION: 97 %

## 2022-10-05 PROCEDURE — 6520001 HSPC ROUTINE CARE

## 2022-10-05 PROCEDURE — G0299 HHS/HOSPICE OF RN EA 15 MIN: HCPCS

## 2022-10-05 PROCEDURE — 65100000000 HSPC ROUTINE CARE

## 2022-10-06 PROCEDURE — 6520001 HSPC ROUTINE CARE

## 2022-10-06 PROCEDURE — 65100000000 HSPC ROUTINE CARE

## 2022-10-06 NOTE — HOSPICE
Patient alert and oriented X 2. Denies pain. Full body assessment completed - see body systems charting and vital signs. Patient and staff deny any needs at this time. Instructed to call for any needs, changes or concerns.

## 2022-10-07 PROCEDURE — 6520001 HSPC ROUTINE CARE

## 2022-10-07 PROCEDURE — 65100000000 HSPC ROUTINE CARE

## 2022-10-08 PROCEDURE — 6520001 HSPC ROUTINE CARE

## 2022-10-08 PROCEDURE — 65100000000 HSPC ROUTINE CARE

## 2022-10-09 PROCEDURE — 65100000000 HSPC ROUTINE CARE

## 2022-10-09 PROCEDURE — 6520001 HSPC ROUTINE CARE

## 2022-10-10 PROCEDURE — 65100000000 HSPC ROUTINE CARE

## 2022-10-10 PROCEDURE — 6520001 HSPC ROUTINE CARE

## 2022-10-11 PROCEDURE — 65100000000 HSPC ROUTINE CARE

## 2022-10-11 PROCEDURE — 6520001 HSPC ROUTINE CARE

## 2022-10-12 ENCOUNTER — HOME CARE VISIT (OUTPATIENT)
Dept: HOME HEALTH SERVICES | Facility: CLINIC | Age: 72
End: 2022-10-12

## 2022-10-12 VITALS
DIASTOLIC BLOOD PRESSURE: 68 MMHG | OXYGEN SATURATION: 98 % | SYSTOLIC BLOOD PRESSURE: 118 MMHG | HEART RATE: 98 BPM | TEMPERATURE: 97.9 F | BODY MASS INDEX: 19.46 KG/M2 | RESPIRATION RATE: 20 BRPM | WEIGHT: 151.6 LBS

## 2022-10-12 PROCEDURE — 6520001 HSPC ROUTINE CARE

## 2022-10-12 PROCEDURE — G0299 HHS/HOSPICE OF RN EA 15 MIN: HCPCS

## 2022-10-12 PROCEDURE — 65100000000 HSPC ROUTINE CARE

## 2022-10-12 NOTE — HOSPICE
Patient introduced by nurse.  He was in bed and requested to have HOB raised while I was present.  Assessment completed and pt. denied any pain or interventions that would be helpful to him at the present.  Nurse stated he had been tearful a few times recently but medication change that had occured seems to be helping on frequency.  Reviewed wt. with nurse and noted pt. gaining weight, CNA that helps him eat revealed he can eat when prompted and eats up to 100% sometimes but averaging 50% and still getting feeding at night. Staff voiced understanding to call Hospice if changes or concerns.  Will continue to consult with team members for patient poc.

## 2022-10-13 PROCEDURE — 6520001 HSPC ROUTINE CARE

## 2022-10-13 PROCEDURE — 65100000000 HSPC ROUTINE CARE

## 2022-10-14 PROCEDURE — 65100000000 HSPC ROUTINE CARE

## 2022-10-14 PROCEDURE — 6520001 HSPC ROUTINE CARE

## 2022-10-15 PROCEDURE — 6520001 HSPC ROUTINE CARE

## 2022-10-15 PROCEDURE — 65100000000 HSPC ROUTINE CARE

## 2022-10-16 ENCOUNTER — HOME CARE VISIT (OUTPATIENT)
Dept: HOME HEALTH SERVICES | Facility: CLINIC | Age: 72
End: 2022-10-16

## 2022-10-16 PROCEDURE — 65100000000 HSPC ROUTINE CARE

## 2022-10-16 PROCEDURE — G0299 HHS/HOSPICE OF RN EA 15 MIN: HCPCS

## 2022-10-16 PROCEDURE — 6520001 HSPC ROUTINE CARE

## 2022-10-17 VITALS
RESPIRATION RATE: 18 BRPM | OXYGEN SATURATION: 96 % | HEART RATE: 96 BPM | SYSTOLIC BLOOD PRESSURE: 128 MMHG | TEMPERATURE: 97.7 F | DIASTOLIC BLOOD PRESSURE: 60 MMHG

## 2022-10-17 PROCEDURE — 65100000000 HSPC ROUTINE CARE

## 2022-10-17 PROCEDURE — 6520001 HSPC ROUTINE CARE

## 2022-10-17 NOTE — HOSPICE
Receievd pt sitting up in wheelchair at nurses station. He is alert and responsive denies pain.  He has tremors to both upper extremitites he has left sided weakness.  Hester cath intact and drainage bag at side clear yellow urine noted.  Staff denies any needs or any new changes noted.  Emotional support provided Instructed to call hospice for any needs or changes in condition

## 2022-10-18 PROCEDURE — 65100000000 HSPC ROUTINE CARE

## 2022-10-18 PROCEDURE — 6520001 HSPC ROUTINE CARE

## 2022-10-19 PROCEDURE — 6520001 HSPC ROUTINE CARE

## 2022-10-19 PROCEDURE — 65100000000 HSPC ROUTINE CARE

## 2022-10-20 PROCEDURE — 6520001 HSPC ROUTINE CARE

## 2022-10-20 PROCEDURE — 65100000000 HSPC ROUTINE CARE

## 2022-10-21 PROCEDURE — 65100000000 HSPC ROUTINE CARE

## 2022-10-22 PROCEDURE — 65100000000 HSPC ROUTINE CARE

## 2022-10-23 ENCOUNTER — HOME CARE VISIT (OUTPATIENT)
Dept: HOME HEALTH SERVICES | Facility: CLINIC | Age: 72
End: 2022-10-23

## 2022-10-23 PROCEDURE — G0299 HHS/HOSPICE OF RN EA 15 MIN: HCPCS

## 2022-10-23 PROCEDURE — 65100000000 HSPC ROUTINE CARE

## 2022-10-24 VITALS
OXYGEN SATURATION: 95 % | SYSTOLIC BLOOD PRESSURE: 130 MMHG | HEART RATE: 98 BPM | TEMPERATURE: 97.9 F | RESPIRATION RATE: 18 BRPM | DIASTOLIC BLOOD PRESSURE: 60 MMHG

## 2022-10-24 PROCEDURE — 65100000000 HSPC ROUTINE CARE

## 2022-10-24 NOTE — HOSPICE
Receievd pt sitting up in wheelchair he is alert and responsive denies pain or discomfort has tremors to both upper extremities and leans to left due to left sided paralysis.  Has 24fr clifton intact and patent draining clear yellow urine.  xStaff denies any needs or acute changes noted in condition.  Emotional support provided instructed to call hospice for any other needs

## 2022-10-25 PROCEDURE — 65100000000 HSPC ROUTINE CARE

## 2022-10-26 ENCOUNTER — HOME CARE VISIT (OUTPATIENT)
Dept: HOME HEALTH SERVICES | Facility: CLINIC | Age: 72
End: 2022-10-26

## 2022-10-26 PROCEDURE — 65100000000 HSPC ROUTINE CARE

## 2022-10-27 PROCEDURE — 65100000000 HSPC ROUTINE CARE

## 2022-10-27 NOTE — HOSPICE
The patient was sleeping. There were no visible fall risks.    The patient's daughter and I discussed concerns she had regarding the patient.    I offered understanding, and a listening and spiritual presence.

## 2022-10-28 PROCEDURE — 65100000000 HSPC ROUTINE CARE

## 2022-10-29 PROCEDURE — 65100000000 HSPC ROUTINE CARE

## 2022-10-30 ENCOUNTER — HOME CARE VISIT (OUTPATIENT)
Dept: HOME HEALTH SERVICES | Facility: CLINIC | Age: 72
End: 2022-10-30

## 2022-10-30 PROCEDURE — G0299 HHS/HOSPICE OF RN EA 15 MIN: HCPCS

## 2022-10-30 PROCEDURE — 65100000000 HSPC ROUTINE CARE

## 2022-10-31 VITALS
DIASTOLIC BLOOD PRESSURE: 60 MMHG | RESPIRATION RATE: 18 BRPM | OXYGEN SATURATION: 96 % | SYSTOLIC BLOOD PRESSURE: 120 MMHG | TEMPERATURE: 98.8 F | HEART RATE: 76 BPM

## 2022-10-31 PROCEDURE — 37184 PRIM ART M-THRMBC 1ST VSL: CPT

## 2022-10-31 PROCEDURE — 65100000000 HSPC ROUTINE CARE

## 2022-10-31 NOTE — HOSPICE
receievd pt sitting up in wheelchair he is alert and responsive he denies any c/o pain or discomfort at this time.  Has heel proctectors in place clifton intact with drainage bag at side clear yellow urine noted.  Has mild tremors to both upper extremities with lean to left side due to paralysis. Emotional support provided instructed staff to call hospice for any other acute changes

## 2022-11-01 PROCEDURE — 65100000000 HSPC ROUTINE CARE

## 2022-11-02 PROCEDURE — 65100000000 HSPC ROUTINE CARE

## 2022-11-03 PROCEDURE — 65100000000 HSPC ROUTINE CARE

## 2022-11-04 PROCEDURE — 65100000000 HSPC ROUTINE CARE

## 2022-11-05 PROCEDURE — 65100000000 HSPC ROUTINE CARE

## 2022-11-06 ENCOUNTER — HOME CARE VISIT (OUTPATIENT)
Dept: HOME HEALTH SERVICES | Facility: CLINIC | Age: 72
End: 2022-11-06

## 2022-11-06 PROCEDURE — 65100000000 HSPC ROUTINE CARE

## 2022-11-06 PROCEDURE — G0299 HHS/HOSPICE OF RN EA 15 MIN: HCPCS

## 2022-11-07 VITALS
TEMPERATURE: 97.7 F | SYSTOLIC BLOOD PRESSURE: 120 MMHG | RESPIRATION RATE: 18 BRPM | HEART RATE: 87 BPM | DIASTOLIC BLOOD PRESSURE: 80 MMHG | OXYGEN SATURATION: 95 %

## 2022-11-07 PROCEDURE — 65100000000 HSPC ROUTINE CARE

## 2022-11-07 NOTE — HOSPICE
pt up in wheelchair in recreation area watching tv pt denies any c/o pain or discomfort at this time.  Staff report his tubefeeding has been decreased to @ bedtime x4hrs due to pt eating 50% or more of his meals he has skin tear to left forearm with steri strips intact pt report he bumped his arm on wheel cahir.  No other changes noted in condition staff encoraged to call hospice for any other needs or changes in condition

## 2022-11-08 ENCOUNTER — OUTSIDE FACILITY SERVICE (OUTPATIENT)
Dept: FAMILY MEDICINE CLINIC | Facility: CLINIC | Age: 72
End: 2022-11-08

## 2022-11-08 PROCEDURE — 65100000000 HSPC ROUTINE CARE

## 2022-11-08 PROCEDURE — 99308 SBSQ NF CARE LOW MDM 20: CPT | Performed by: FAMILY MEDICINE

## 2022-11-09 PROCEDURE — 65100000000 HSPC ROUTINE CARE

## 2022-11-10 PROCEDURE — 65100000000 HSPC ROUTINE CARE

## 2022-11-11 PROCEDURE — 65100000000 HSPC ROUTINE CARE

## 2022-11-12 PROCEDURE — 65100000000 HSPC ROUTINE CARE

## 2022-11-13 ENCOUNTER — HOME CARE VISIT (OUTPATIENT)
Dept: HOME HEALTH SERVICES | Facility: CLINIC | Age: 72
End: 2022-11-13

## 2022-11-13 PROCEDURE — 65100000000 HSPC ROUTINE CARE

## 2022-11-13 PROCEDURE — G0299 HHS/HOSPICE OF RN EA 15 MIN: HCPCS

## 2022-11-14 VITALS
HEART RATE: 81 BPM | SYSTOLIC BLOOD PRESSURE: 124 MMHG | RESPIRATION RATE: 18 BRPM | OXYGEN SATURATION: 95 % | TEMPERATURE: 97 F | DIASTOLIC BLOOD PRESSURE: 60 MMHG

## 2022-11-14 PROCEDURE — 65100000000 HSPC ROUTINE CARE

## 2022-11-14 NOTE — HOSPICE
"Received pt laying in bed with eyes open and alert pt report feeling \"ok\" he denies any c/o pain or discomfort. Has left side paralsis mild tremors to both upper extremities.  Pt does lean to left side when up in wheelchair poor upper body control.  Has heel protectors intact while in bed.  24fr clifton intact with drainage bag at bedside with clear yellow urine noted.  Pt is now fed at restoritive table for supervision his current weight 155.2 with  3lb weight gain.  His tubefeeding was decreased to peptamen 1.5 @ 75ml/hr  x4 hours pt eats 90% of meals.  Instructed staff to call hospice for any acute changes or needs"

## 2022-11-15 PROCEDURE — 65100000000 HSPC ROUTINE CARE

## 2022-11-16 PROCEDURE — 65100000000 HSPC ROUTINE CARE

## 2022-11-17 PROCEDURE — 65100000000 HSPC ROUTINE CARE

## 2022-11-18 ENCOUNTER — HOME CARE VISIT (OUTPATIENT)
Dept: HOSPICE | Facility: HOSPICE | Age: 72
End: 2022-11-18

## 2022-11-18 PROCEDURE — 65100000000 HSPC ROUTINE CARE

## 2022-11-18 NOTE — HOSPICE
After discussion with Patients daughter of improvements observed by care team and her observations that her father is eating more, not needing the feeding as much.  Stated that after he started eating at the restorative table and more intake and talking to other residents she started to see an improvement herself and he was getting out of room instead of staying in the bed.  Daughter in agreement to continue with d/c today and if situation changes she would call Hospice if she sees that is intervention needed.

## 2023-01-05 ENCOUNTER — OUTSIDE FACILITY SERVICE (OUTPATIENT)
Dept: FAMILY MEDICINE CLINIC | Facility: CLINIC | Age: 73
End: 2023-01-05
Payer: MEDICARE

## 2023-01-05 PROCEDURE — 99308 SBSQ NF CARE LOW MDM 20: CPT | Performed by: FAMILY MEDICINE

## 2023-02-14 ENCOUNTER — OUTSIDE FACILITY SERVICE (OUTPATIENT)
Dept: FAMILY MEDICINE CLINIC | Facility: CLINIC | Age: 73
End: 2023-02-14
Payer: MEDICARE

## 2023-02-14 PROCEDURE — 99308 SBSQ NF CARE LOW MDM 20: CPT | Performed by: FAMILY MEDICINE

## 2023-02-16 ENCOUNTER — OUTSIDE FACILITY SERVICE (OUTPATIENT)
Dept: FAMILY MEDICINE CLINIC | Facility: CLINIC | Age: 73
End: 2023-02-16
Payer: MEDICARE

## 2023-02-16 PROCEDURE — 99307 SBSQ NF CARE SF MDM 10: CPT | Performed by: FAMILY MEDICINE

## 2023-03-07 ENCOUNTER — OUTSIDE FACILITY SERVICE (OUTPATIENT)
Dept: FAMILY MEDICINE CLINIC | Facility: CLINIC | Age: 73
End: 2023-03-07
Payer: COMMERCIAL

## 2023-03-07 PROCEDURE — 99308 SBSQ NF CARE LOW MDM 20: CPT | Performed by: FAMILY MEDICINE

## 2023-04-11 ENCOUNTER — OUTSIDE FACILITY SERVICE (OUTPATIENT)
Dept: FAMILY MEDICINE CLINIC | Facility: CLINIC | Age: 73
End: 2023-04-11
Payer: MEDICARE

## 2023-04-11 PROCEDURE — 99308 SBSQ NF CARE LOW MDM 20: CPT | Performed by: FAMILY MEDICINE

## 2023-05-23 ENCOUNTER — OUTSIDE FACILITY SERVICE (OUTPATIENT)
Dept: FAMILY MEDICINE CLINIC | Facility: CLINIC | Age: 73
End: 2023-05-23
Payer: MEDICARE

## 2023-07-25 ENCOUNTER — OUTSIDE FACILITY SERVICE (OUTPATIENT)
Dept: FAMILY MEDICINE CLINIC | Facility: CLINIC | Age: 73
End: 2023-07-25
Payer: MEDICAID

## 2023-09-07 ENCOUNTER — OUTSIDE FACILITY SERVICE (OUTPATIENT)
Dept: FAMILY MEDICINE CLINIC | Facility: CLINIC | Age: 73
End: 2023-09-07
Payer: COMMERCIAL

## 2023-09-07 PROCEDURE — 99308 SBSQ NF CARE LOW MDM 20: CPT | Performed by: FAMILY MEDICINE

## (undated) DEVICE — SINGLE-USE BIOPSY FORCEPS: Brand: RADIAL JAW 4

## (undated) DEVICE — BITEBLOCK ENDO W/STRAP 60F A/ LF DISP